# Patient Record
Sex: FEMALE | Race: WHITE | Employment: FULL TIME | URBAN - METROPOLITAN AREA
[De-identification: names, ages, dates, MRNs, and addresses within clinical notes are randomized per-mention and may not be internally consistent; named-entity substitution may affect disease eponyms.]

---

## 2018-09-05 PROBLEM — M47.812 CERVICAL SPONDYLOSIS WITHOUT MYELOPATHY: Status: ACTIVE | Noted: 2018-09-05

## 2019-01-24 ENCOUNTER — HOSPITAL ENCOUNTER (OUTPATIENT)
Dept: MRI IMAGING | Age: 59
Discharge: HOME | End: 2019-01-26
Payer: COMMERCIAL

## 2019-01-24 DIAGNOSIS — M54.16 RIGHT LUMBAR RADICULOPATHY: ICD-10-CM

## 2019-01-24 PROCEDURE — 72148 MRI LUMBAR SPINE W/O DYE: CPT

## 2020-01-23 ENCOUNTER — OFFICE VISIT (OUTPATIENT)
Dept: FAMILY MEDICINE CLINIC | Age: 60
End: 2020-01-23
Payer: COMMERCIAL

## 2020-01-23 ENCOUNTER — TELEPHONE (OUTPATIENT)
Dept: FAMILY MEDICINE CLINIC | Age: 60
End: 2020-01-23

## 2020-01-23 VITALS
HEART RATE: 68 BPM | OXYGEN SATURATION: 97 % | TEMPERATURE: 98.5 F | DIASTOLIC BLOOD PRESSURE: 80 MMHG | WEIGHT: 180 LBS | HEIGHT: 63 IN | SYSTOLIC BLOOD PRESSURE: 136 MMHG | BODY MASS INDEX: 31.89 KG/M2 | RESPIRATION RATE: 15 BRPM

## 2020-01-23 PROCEDURE — 99204 OFFICE O/P NEW MOD 45 MIN: CPT | Performed by: INTERNAL MEDICINE

## 2020-01-23 RX ORDER — ESCITALOPRAM OXALATE 10 MG/1
10 TABLET ORAL DAILY
COMMUNITY
End: 2020-07-15 | Stop reason: DRUGHIGH

## 2020-01-23 ASSESSMENT — ENCOUNTER SYMPTOMS
EYE PAIN: 0
BACK PAIN: 0
SHORTNESS OF BREATH: 0
ABDOMINAL PAIN: 0

## 2020-01-23 NOTE — PROGRESS NOTES
Occupational History    Occupation:      Employer: [employer]   Social Needs    Financial resource strain: Not on file    Food insecurity:     Worry: Not on file     Inability: Not on file    Transportation needs:     Medical: Not on file     Non-medical: Not on file   Tobacco Use    Smoking status: Former Smoker     Packs/day: 0.25     Years: 35.00     Pack years: 8.75     Start date: 2013     Last attempt to quit: 2019     Years since quittin.8    Smokeless tobacco: Never Used   Substance and Sexual Activity    Alcohol use: No     Alcohol/week: 0.0 standard drinks    Drug use: Not Currently    Sexual activity: Yes     Partners: Male   Lifestyle    Physical activity:     Days per week: Not on file     Minutes per session: Not on file    Stress: Not on file   Relationships    Social connections:     Talks on phone: Not on file     Gets together: Not on file     Attends [employer] service: Not on file     Active member of club or organization: Not on file     Attends meetings of clubs or organizations: Not on file     Relationship status: Not on file    Intimate partner violence:     Fear of current or ex partner: Not on file     Emotionally abused: Not on file     Physically abused: Not on file     Forced sexual activity: Not on file   Other Topics Concern    Not on file   Social History Narrative    Not on file     No Known Allergies  Current Outpatient Medications on File Prior to Visit   Medication Sig Dispense Refill    escitalopram (LEXAPRO) 10 MG tablet Take 10 mg by mouth daily      levothyroxine (SYNTHROID) 100 MCG tablet Take 1 tablet by mouth daily. 30 tablet 5    albuterol (PROVENTIL HFA) 108 (90 BASE) MCG/ACT inhaler Inhale 2 puffs into the lungs every 6 hours as needed for Wheezing. 1 Inhaler 3    omeprazole (PRILOSEC) 40 MG capsule Take 1 capsule by mouth daily.  30 capsule 5    lidocaine (LMX) 4 % cream Apply a half dollar sized amount to intact skin Referral Reason:   Specialty Services Required     Referred to Provider:   Basilia Sheffield MD     Requested Specialty:   Physical Medicine and Rehab     Number of Visits Requested:   1    EMG     Standing Status:   Future     Standing Expiration Date:   3/23/2020     Order Specific Question:   Which body part? Answer:   right upper extremity     No orders of the defined types were placed in this encounter. Return in about 4 weeks (around 2/20/2020) for worsening symptoms, call ASAP for appointment, Chronic condition management/appointment. Fany Leal MD    If anything should change or worsen call ASAP, don't wait for next scheduled appointment.

## 2020-07-15 ENCOUNTER — OFFICE VISIT (OUTPATIENT)
Dept: FAMILY MEDICINE CLINIC | Age: 60
End: 2020-07-15
Payer: COMMERCIAL

## 2020-07-15 ENCOUNTER — TELEPHONE (OUTPATIENT)
Dept: FAMILY MEDICINE CLINIC | Age: 60
End: 2020-07-15

## 2020-07-15 VITALS
SYSTOLIC BLOOD PRESSURE: 131 MMHG | TEMPERATURE: 97 F | HEIGHT: 63 IN | RESPIRATION RATE: 15 BRPM | DIASTOLIC BLOOD PRESSURE: 77 MMHG | HEART RATE: 66 BPM | OXYGEN SATURATION: 97 % | WEIGHT: 174 LBS | BODY MASS INDEX: 30.83 KG/M2

## 2020-07-15 PROCEDURE — 99214 OFFICE O/P EST MOD 30 MIN: CPT | Performed by: INTERNAL MEDICINE

## 2020-07-15 RX ORDER — ESCITALOPRAM OXALATE 20 MG/1
20 TABLET ORAL DAILY
Qty: 30 TABLET | Refills: 1 | Status: SHIPPED | OUTPATIENT
Start: 2020-07-15 | End: 2020-10-14 | Stop reason: SDUPTHER

## 2020-07-15 ASSESSMENT — ENCOUNTER SYMPTOMS
EYE PAIN: 0
ABDOMINAL PAIN: 0
BACK PAIN: 0
SHORTNESS OF BREATH: 0

## 2020-07-15 NOTE — PROGRESS NOTES
Subjective:      Patient ID: Min Nolen is a 62 y.o. female who presents today with:  Chief Complaint   Patient presents with    Follow-up     bilateral pain in hands and feet     Discuss Labs    Discuss Medications       HPI     Hypothyroidism-Chronic, improved with synthroid 100 micrograms once daily. Compliant. Anxiety-Chronic issue, well controlled with lexapro. No si/hi, denies depression. Mentions more of a hard time focusing and concentrating. GERD-Chronic, improved with ppi, no abdominal pain, dark or bright stools, compliant. Past Medical History:   Diagnosis Date    Colon polyposis 2013    multiple on colonoscopy, do [id number]    Depression     Esophageal web 2013    Fibromyalgia 2011    GERD (gastroesophageal reflux disease)     Hyperlipidemia     Hypothyroidism     Macular degeneration     left eye    Normal nuclear stress test 3/21/13     Past Surgical History:   Procedure Laterality Date     SECTION      CHOLECYSTECTOMY  13    Lapchole with gram    COLONOSCOPY  2013    HERNIA REPAIR      HYSTERECTOMY      still with one ovary    UPPER GASTROINTESTINAL ENDOSCOPY  2013     Social History     Socioeconomic History    Marital status:      Spouse name: Not on file    Number of children: Not on file    Years of education: Not on file    Highest education level: Not on file   Occupational History    Occupation:      Employer: [employer]   Social Needs    Financial resource strain: Not on file    Food insecurity     Worry: Not on file     Inability: Not on file    Transportation needs     Medical: Not on file     Non-medical: Not on file   Tobacco Use    Smoking status: Former Smoker     Packs/day: 0.25     Years: 35.00     Pack years: 8.75     Start date: 2013     Last attempt to quit: 2019     Years since quittin.2    Smokeless tobacco: Never Used   Substance and Sexual Activity    Alcohol use:  No Alcohol/week: 0.0 standard drinks    Drug use: Not Currently    Sexual activity: Yes     Partners: Male   Lifestyle    Physical activity     Days per week: Not on file     Minutes per session: Not on file    Stress: Not on file   Relationships    Social connections     Talks on phone: Not on file     Gets together: Not on file     Attends [employer] service: Not on file     Active member of club or organization: Not on file     Attends meetings of clubs or organizations: Not on file     Relationship status: Not on file    Intimate partner violence     Fear of current or ex partner: Not on file     Emotionally abused: Not on file     Physically abused: Not on file     Forced sexual activity: Not on file   Other Topics Concern    Not on file   Social History Narrative    Not on file     No Known Allergies  Current Outpatient Medications on File Prior to Visit   Medication Sig Dispense Refill    levothyroxine (SYNTHROID) 100 MCG tablet Take 1 tablet by mouth daily. 30 tablet 5    albuterol (PROVENTIL HFA) 108 (90 BASE) MCG/ACT inhaler Inhale 2 puffs into the lungs every 6 hours as needed for Wheezing. 1 Inhaler 3    omeprazole (PRILOSEC) 40 MG capsule Take 1 capsule by mouth daily. 30 capsule 5    lidocaine (LMX) 4 % cream Apply a half dollar sized amount to intact skin topically up to twice daily as needed for pain (Patient not taking: Reported on 1/23/2020) 1 Tube 1     No current facility-administered medications on file prior to visit. I have personally reviewed the ROS, PMH, PFH, and social history     Review of Systems   Constitutional: Negative for chills and fever. HENT: Negative for congestion. Eyes: Negative for pain. Respiratory: Negative for shortness of breath. Cardiovascular: Negative for chest pain. Gastrointestinal: Negative for abdominal pain. Genitourinary: Negative for hematuria. Musculoskeletal: Negative for back pain.    Allergic/Immunologic: Negative for immunocompromised state. Neurological: Negative for headaches. Psychiatric/Behavioral: Negative for hallucinations. Objective:   /77 (Site: Left Upper Arm, Position: Sitting, Cuff Size: Large Adult)   Pulse 66   Temp 97 °F (36.1 °C) (Oral)   Resp 15   Ht 5' 3\" (1.6 m)   Wt 174 lb (78.9 kg)   SpO2 97%   BMI 30.82 kg/m²     Physical Exam  Constitutional:       Appearance: She is well-developed. HENT:      Head: Normocephalic. Eyes:      Pupils: Pupils are equal, round, and reactive to light. Neck:      Vascular: No carotid bruit. Trachea: No tracheal deviation. Cardiovascular:      Rate and Rhythm: Normal rate and regular rhythm. Heart sounds: Normal heart sounds. No murmur. No friction rub. No gallop. Pulmonary:      Effort: No respiratory distress. Breath sounds: No wheezing or rales. Abdominal:      General: Bowel sounds are normal. There is no distension. Palpations: Abdomen is soft. Tenderness: There is no abdominal tenderness. There is no guarding or rebound. Skin:     General: Skin is warm and dry. Findings: No erythema or rash. Neurological:      Mental Status: She is oriented to person, place, and time. Sensory: No sensory deficit. Motor: No weakness. Gait: Gait normal.      Deep Tendon Reflexes: Reflexes abnormal.      Comments: Absent br on right, 1+ triceps bl  Otherwise 2+ in bl upper extremities. Assessment:       Diagnosis Orders   1. Gastroesophageal reflux disease without esophagitis     2. Anxiety     3. Hypothyroidism, unspecified type           Plan:    vc  Please get labs done. Didn't get mri c spine done yet. Continue chronic medications  Saw ent  Did not complete emg not see pain management. (she will reschedule)  Increase lexapro and see if she does better  Understands SSI can increase SI and to call immediately if this should occur. Get last colonoscopy records.    Get mammogram 10/2019 from [facility] Needs periodic pelvic for remaining ovary (risk of ovarian cancer)  She will schedule with NP for pelvic exam.   Explained risk of AIN, mg wasting, hip fracture, osteoporosis, etc.   Recommend you discuss with your GI about long term use   Get ct neck from  (dr Bozena Ibarra had reviewed this per patient)   Need cervical xrays. Already did PT. (defer to Pain management) she will follow up     No orders of the defined types were placed in this encounter. Orders Placed This Encounter   Medications    escitalopram (LEXAPRO) 20 MG tablet     Sig: Take 1 tablet by mouth daily     Dispense:  30 tablet     Refill:  1     If anything should change or worsen call ASAP, don't wait for next scheduled appointment. No follow-ups on file.       Aleksandar Draper MD

## 2020-07-16 NOTE — PATIENT INSTRUCTIONS
Patient Education        Hypothyroidism: Care Instructions  Your Care Instructions     When you have hypothyroidism, your body doesn't make enough thyroid hormone. This hormone helps your body use energy. If your thyroid level is low, you may feel tired, be constipated, have an increase in your blood pressure, or have dry skin or memory problems. You may also get cold easily, even when it is warm. Women with low thyroid levels may have heavy menstrual periods. A blood test to find your thyroid-stimulating hormone (TSH) level is used to check for hypothyroidism. A high TSH level may mean that you have it. The treatment for hypothyroidism is thyroid hormone pills. You should start to feel better in 1 to 2 weeks. Most people need treatment for the rest of their lives. You will need regular visits with your doctor to make sure you are doing well and that you have the right dose of medicine. Follow-up care is a key part of your treatment and safety. Be sure to make and go to all appointments, and call your doctor if you are having problems. It's also a good idea to know your test results and keep a list of the medicines you take. How can you care for yourself at home? · Take your thyroid hormone medicine exactly as prescribed. Call your doctor if you think you are having a problem with your medicine. Most people do not have side effects if they take the right amount of medicine regularly. ? Take the medicine 30 minutes before breakfast, and do not take it with calcium, vitamins, or iron. ? Do not take extra doses of your thyroid medicine. It will not help you get better any faster, and it may cause side effects. ? If you forget to take a dose, do NOT take a double dose of medicine. Take your usual dose the next day. · Tell your doctor about all prescription, herbal, or over-the-counter products you take. · Take care of yourself. Eat a healthy diet, get enough sleep, and get regular exercise.   When should you

## 2020-08-07 DIAGNOSIS — R22.1 NECK MASS: ICD-10-CM

## 2020-08-07 DIAGNOSIS — E03.9 HYPOTHYROIDISM, UNSPECIFIED TYPE: ICD-10-CM

## 2020-08-07 DIAGNOSIS — K21.9 GASTROESOPHAGEAL REFLUX DISEASE WITHOUT ESOPHAGITIS: ICD-10-CM

## 2020-08-07 DIAGNOSIS — F41.9 ANXIETY: ICD-10-CM

## 2020-08-07 DIAGNOSIS — M79.651 PAIN OF RIGHT THIGH: ICD-10-CM

## 2020-08-07 DIAGNOSIS — M54.2 NECK PAIN: ICD-10-CM

## 2020-08-07 LAB
ALBUMIN SERPL-MCNC: 4.5 G/DL (ref 3.5–4.6)
ALP BLD-CCNC: 87 U/L (ref 40–130)
ALT SERPL-CCNC: 14 U/L (ref 0–33)
ANION GAP SERPL CALCULATED.3IONS-SCNC: 14 MEQ/L (ref 9–15)
AST SERPL-CCNC: 15 U/L (ref 0–35)
BASOPHILS ABSOLUTE: 0 K/UL (ref 0–0.2)
BASOPHILS RELATIVE PERCENT: 0.7 %
BILIRUB SERPL-MCNC: 0.6 MG/DL (ref 0.2–0.7)
BUN BLDV-MCNC: 21 MG/DL (ref 6–20)
CALCIUM SERPL-MCNC: 9.5 MG/DL (ref 8.5–9.9)
CHLORIDE BLD-SCNC: 103 MEQ/L (ref 95–107)
CHOLESTEROL, TOTAL: 242 MG/DL (ref 0–199)
CO2: 23 MEQ/L (ref 20–31)
CORTISOL - AM: 9.6 UG/DL (ref 6.2–19.4)
CREAT SERPL-MCNC: 0.92 MG/DL (ref 0.5–0.9)
EOSINOPHILS ABSOLUTE: 0.1 K/UL (ref 0–0.7)
EOSINOPHILS RELATIVE PERCENT: 1.8 %
GFR AFRICAN AMERICAN: >60
GFR NON-AFRICAN AMERICAN: >60
GLOBULIN: 2.8 G/DL (ref 2.3–3.5)
GLUCOSE BLD-MCNC: 92 MG/DL (ref 70–99)
HBA1C MFR BLD: 6 % (ref 4.8–5.9)
HCT VFR BLD CALC: 39.8 % (ref 37–47)
HDLC SERPL-MCNC: 51 MG/DL (ref 40–59)
HEMOGLOBIN: 13.3 G/DL (ref 12–16)
LDL CHOLESTEROL CALCULATED: 170 MG/DL (ref 0–129)
LYMPHOCYTES ABSOLUTE: 1.9 K/UL (ref 1–4.8)
LYMPHOCYTES RELATIVE PERCENT: 37.7 %
MCH RBC QN AUTO: 29.7 PG (ref 27–31.3)
MCHC RBC AUTO-ENTMCNC: 33.4 % (ref 33–37)
MCV RBC AUTO: 89 FL (ref 82–100)
MONOCYTES ABSOLUTE: 0.3 K/UL (ref 0.2–0.8)
MONOCYTES RELATIVE PERCENT: 6.8 %
NEUTROPHILS ABSOLUTE: 2.7 K/UL (ref 1.4–6.5)
NEUTROPHILS RELATIVE PERCENT: 53 %
PDW BLD-RTO: 13.8 % (ref 11.5–14.5)
PLATELET # BLD: 285 K/UL (ref 130–400)
POTASSIUM SERPL-SCNC: 4.5 MEQ/L (ref 3.4–4.9)
RBC # BLD: 4.47 M/UL (ref 4.2–5.4)
SODIUM BLD-SCNC: 140 MEQ/L (ref 135–144)
TOTAL PROTEIN: 7.3 G/DL (ref 6.3–8)
TRIGL SERPL-MCNC: 103 MG/DL (ref 0–150)
TSH REFLEX: 0.75 UIU/ML (ref 0.44–3.86)
VITAMIN D 25-HYDROXY: 50.2 NG/ML (ref 30–100)
WBC # BLD: 5.1 K/UL (ref 4.8–10.8)

## 2020-08-12 ENCOUNTER — TELEPHONE (OUTPATIENT)
Dept: FAMILY MEDICINE CLINIC | Age: 60
End: 2020-08-12

## 2020-08-12 ENCOUNTER — VIRTUAL VISIT (OUTPATIENT)
Dept: FAMILY MEDICINE CLINIC | Age: 60
End: 2020-08-12
Payer: COMMERCIAL

## 2020-08-12 PROCEDURE — 99214 OFFICE O/P EST MOD 30 MIN: CPT | Performed by: INTERNAL MEDICINE

## 2020-08-12 RX ORDER — ATORVASTATIN CALCIUM 40 MG/1
40 TABLET, FILM COATED ORAL DAILY
Qty: 90 TABLET | Refills: 0 | Status: SHIPPED | OUTPATIENT
Start: 2020-08-12 | End: 2020-11-11

## 2020-08-12 RX ORDER — ATORVASTATIN CALCIUM 20 MG/1
20 TABLET, FILM COATED ORAL DAILY
Qty: 90 TABLET | Refills: 0 | OUTPATIENT
Start: 2020-08-12

## 2020-08-12 RX ORDER — ATORVASTATIN CALCIUM 20 MG/1
20 TABLET, FILM COATED ORAL DAILY
Qty: 30 TABLET | Refills: 0 | Status: SHIPPED | OUTPATIENT
Start: 2020-08-12 | End: 2021-02-16 | Stop reason: SDUPTHER

## 2020-08-12 NOTE — TELEPHONE ENCOUNTER
Mammogram done through [facility]    Colonoscopy needs records Dr. Jolene Shipley records for both

## 2020-08-12 NOTE — PROGRESS NOTES
Subjective:      Patient ID: Yamila Davis is a 62 y.o. female who presents today with:  No chief complaint on file. HPI      Chronic, improved with statin therapy. Known obesity. FeMale Sex. Compliant with therapy. Mother has heart disease. High cholesterol and brother has similar issues    Hypothyroidism-Chronic, improved with synthroid 100 micrograms once daily. Compliant. Denies being cold. Anxiety-Chronic issue, on lexapro 20 mg once daily. No SI/HI. Mentions difficulty concentrating. Some difficulty falling asleep as well. Past Medical History:   Diagnosis Date    Colon polyposis 2013    multiple on colonoscopy, do [id number]    Depression     Esophageal web 2013    Fibromyalgia 2011    GERD (gastroesophageal reflux disease)     Hyperlipidemia     Hypothyroidism     Macular degeneration     left eye    Normal nuclear stress test 3/21/13     Past Surgical History:   Procedure Laterality Date     SECTION      CHOLECYSTECTOMY  13    Lapchole with gram    COLONOSCOPY  2013    HERNIA REPAIR      HYSTERECTOMY      still with one ovary    UPPER GASTROINTESTINAL ENDOSCOPY  2013     Social History     Socioeconomic History    Marital status:      Spouse name: Not on file    Number of children: Not on file    Years of education: Not on file    Highest education level: Not on file   Occupational History    Occupation:      Employer: [employer]   Social Needs    Financial resource strain: Not on file    Food insecurity     Worry: Not on file     Inability: Not on file    Transportation needs     Medical: Not on file     Non-medical: Not on file   Tobacco Use    Smoking status: Former Smoker     Packs/day: 0.25     Years: 35.00     Pack years: 8.75     Start date: 2013     Last attempt to quit: 2019     Years since quittin.3    Smokeless tobacco: Never Used   Substance and Sexual Activity    Alcohol use:  No Alcohol/week: 0.0 standard drinks    Drug use: Not Currently    Sexual activity: Yes     Partners: Male   Lifestyle    Physical activity     Days per week: Not on file     Minutes per session: Not on file    Stress: Not on file   Relationships    Social connections     Talks on phone: Not on file     Gets together: Not on file     Attends Latter day service: Not on file     Active member of club or organization: Not on file     Attends meetings of clubs or organizations: Not on file     Relationship status: Not on file    Intimate partner violence     Fear of current or ex partner: Not on file     Emotionally abused: Not on file     Physically abused: Not on file     Forced sexual activity: Not on file   Other Topics Concern    Not on file   Social History Narrative    Not on file     No Known Allergies  Current Outpatient Medications on File Prior to Visit   Medication Sig Dispense Refill    escitalopram (LEXAPRO) 20 MG tablet Take 1 tablet by mouth daily 30 tablet 1    levothyroxine (SYNTHROID) 100 MCG tablet Take 1 tablet by mouth daily. 30 tablet 5    omeprazole (PRILOSEC) 40 MG capsule Take 1 capsule by mouth daily. 30 capsule 5     No current facility-administered medications on file prior to visit. I have personally reviewed the ROS, PMH, PFH, and social history     Review of Systems    Objective: There were no vitals taken for this visit. Physical Exam  Constitutional:       General: She is not in acute distress. Appearance: She is not ill-appearing, toxic-appearing or diaphoretic. Pulmonary:      Effort: No respiratory distress. Assessment:       Diagnosis Orders   1. Hypothyroidism, unspecified type     2. Hyperlipidemia, unspecified hyperlipidemia type  atorvastatin (LIPITOR) 20 MG tablet   3. Unable to concentrate  Lam Monteiro, PhD, Psychology, [place]   4. Anxiety     5. Screening for breast cancer  Comprehensive Metabolic Panel   6.  Elevated serum creatinine  Urinalysis    IRIS DIGITAL SCREEN W OR WO CAD BILATERAL         Plan:    Video visit done because of coronavirus pandemic. Visit done via doxy. me   explained billeable visit, patient understands and agrees to continue  I was at home and patient was at home during this visit. Refer to [facility] for adhd testing  No si/hi, Continue lexapro. Continue chronic medications  Recommend statin, sent. Pre-diabetes, recommend exercise. Will repeat cmp and ua in one month. On lexapro 20 mg once no se, but no real difference. Didn't get mri c spine done yet. She will schedule with NP for pelvic exam. she will do this  She will follow up with dr Cynthia Butler of permanent damage explained  Did not complete emg nor did she see pain management. (she will reschedule)  She admits she puts stuff \"off\"  Make another attempt to get colonoscopy and mammogram records.    Needs periodic pelvic for remaining ovary (risk of ovarian cancer)  Get ct neck from [facility] (dr David Talley had reviewed this per patient) she will help get records. (patient)             Orders Placed This Encounter   Procedures    IRIS DIGITAL SCREEN W OR WO CAD BILATERAL     Standing Status:   Future     Standing Expiration Date:   10/12/2021     Order Specific Question:   Reason for exam:     Answer:   screening    Comprehensive Metabolic Panel     Standing Status:   Future     Standing Expiration Date:   8/12/2021    Urinalysis     Standing Status:   Future     Standing Expiration Date:   8/12/2021   Maren Clayton, PhD, Psychology, [place]     Referral Priority:   Routine     Referral Type:   Eval and Treat     Referral Reason:   Specialty Services Required     Referred to Provider:   Kayleigh Euceda PSYD     Requested Specialty:   Psychology     Number of Visits Requested:   1     Orders Placed This Encounter   Medications    atorvastatin (LIPITOR) 20 MG tablet     Sig: Take 1 tablet by mouth daily     Dispense:  30 tablet     Refill:  0

## 2020-08-14 NOTE — PATIENT INSTRUCTIONS
Patient Education        escitalopram  Pronunciation:  QUOC COTTON o mikayla  Brand:  Lexapro  What is the most important information I should know about escitalopram?  You should not use this medicine you also take pimozide (Orap) or citalopram (Celexa). Do not use escitalopram within 14 days before or 14 days after you have used an MAO inhibitor, such as isocarboxazid, linezolid, methylene blue injection, phenelzine, rasagiline, selegiline, or tranylcypromine. Some young people have thoughts about suicide when first taking an antidepressant. Stay alert to changes in your mood or symptoms. Report any new or worsening symptoms to your doctor. Seek medical attention right away if you have symptoms of serotonin syndrome, such as: agitation, hallucinations, fever, sweating, shivering, fast heart rate, muscle stiffness, twitching, loss of coordination, nausea, vomiting, or diarrhea. Do not give this medicine to anyone under 12 years. What is escitalopram?  Escitalopram is an antidepressant in a group of drugs called selective serotonin reuptake inhibitors (SSRIs). Escitalopram affects chemicals in the brain that may be unbalanced in people with depression or anxiety. Escitalopram is used to treat anxiety in adults. Escitalopram is also used to treat major depressive disorder in adults and adolescents who are at least 15years old. Escitalopram may also be used for purposes not listed in this medication guide. What should I discuss with my healthcare provider before taking escitalopram?  You should not use this medicine if you are allergic to escitalopram or citalopram (Celexa), or if:  · you also take pimozide or citalopram.  Do not use escitalopram within 14 days before or 14 days after you have used an MAO inhibitor. A dangerous drug interaction could occur. MAO inhibitors include isocarboxazid, linezolid, phenelzine, rasagiline, selegiline, and tranylcypromine.   Some medicines can interact with escitalopram and cause a serious condition called serotonin syndrome. Be sure your doctor knows if you also take stimulant medicine, opioid medicine, herbal products, or medicine for depression, mental illness, Parkinson's disease, migraine headaches, serious infections, or prevention of nausea and vomiting. Ask your doctor before making any changes in how or when you take your medications. To make sure escitalopram is safe for you, tell your doctor if you have ever had:  · liver or kidney disease;  · seizures;  · low levels of sodium in your blood;  · heart disease, high blood pressure;  · a stroke;  · a bleeding or blood clotting disorder;  · bipolar disorder (manic depression); or  · drug addiction or suicidal thoughts. Some young people have thoughts about suicide when first taking an antidepressant. Your doctor should check your progress at regular visits. Your family or other caregivers should also be alert to changes in your mood or symptoms. Taking an SSRI antidepressant during pregnancy may cause serious lung problems or other complications in the baby. However, you may have a relapse of depression if you stop taking your antidepressant. Tell your doctor right away if you become pregnant while taking escitalopram. Do not start or stop taking this medicine during pregnancy without your doctor's advice. Escitalopram can pass into breast milk and may harm a nursing baby. Tell your doctor if you are breast-feeding a baby. Escitalopram should not be given to a child younger than 15years old. How should I take escitalopram?  Follow all directions on your prescription label. Your doctor may occasionally change your dose. Do not take this medicine in larger or smaller amounts or for longer than recommended. You may take escitalopram with or without food. Try to take the medicine at the same time each day. Measure liquid medicine with the dosing syringe provided, or with a special dose-measuring spoon or medicine cup.  If around lights;  · racing thoughts, unusual risk-taking behavior, feelings of extreme happiness or sadness;  · low levels of sodium in the body --headache, confusion, slurred speech, severe weakness, vomiting, loss of coordination, feeling unsteady; or  · severe nervous system reaction --very stiff (rigid) muscles, high fever, sweating, confusion, fast or uneven heartbeats, tremors, feeling like you might pass out. Seek medical attention right away if you have symptoms of serotonin syndrome, such as: agitation, hallucinations, fever, sweating, shivering, fast heart rate, muscle stiffness, twitching, loss of coordination, nausea, vomiting, or diarrhea. Common side effects may include:  · dizziness, drowsiness, weakness;  · sweating, feeling shaky or anxious;  · sleep problems (insomnia);  · dry mouth, loss of appetite;  · nausea, constipation;  · yawning;  · weight changes; or  · decreased sex drive, impotence, or difficulty having an orgasm. This is not a complete list of side effects and others may occur. Call your doctor for medical advice about side effects. You may report side effects to FDA at 5-363-FDA-7993. What other drugs will affect escitalopram?  Taking escitalopram with other drugs that make you sleepy can worsen this effect. Ask your doctor before taking a sleeping pill, narcotic medication, muscle relaxer, or medicine for anxiety, depression, or seizures.   Tell your doctor about all medicines you use, and those you start or stop using during your treatment with escitalopram, especially:  · any other antidepressant;  · medicine to treat anxiety, mood disorders, or mental illness;  · lithium, Bib's wort, tramadol, or tryptophan (sometimes called L-tryptophan);  · a blood thinner --warfarin, Coumadin, Jantoven;  · migraine headache medication --sumatriptan, rizatriptan, and others;  · narcotic pain medication --fentanyl or tramadol; or  · stimulants or ADHD medication --Adderall, Concerta, Ritalin, and others; This list is not complete. Other drugs may interact with escitalopram, including prescription and over-the-counter medicines, vitamins, and herbal products. Not all possible interactions are listed in this medication guide. Where can I get more information? Your pharmacist can provide more information about escitalopram.  Remember, keep this and all other medicines out of the reach of children, never share your medicines with others, and use this medication only for the indication prescribed. Every effort has been made to ensure that the information provided by [address] is accurate, up-to-date, and complete, but no guarantee is made to that effect. Drug information contained herein may be time sensitive. [facility] information has been compiled for use by healthcare practitioners and consumers in the [place] and therefore [facility] does not warrant that uses outside of the [place] are appropriate, unless specifically indicated otherwise. [facility]'s drug information does not endorse drugs, diagnose patients or recommend therapy. [facility] information is an informational resource designed to assist licensed healthcare practitioners in caring for their patients and/or to serve consumers viewing this service as a supplement to, and not a substitute for, the expertise, skill, knowledge and judgment of healthcare practitioners. The absence of a warning for a given drug or drug combination in no way should be construed to indicate that the drug or drug combination is safe, effective or appropriate for any given patient. [facility] does not assume any responsibility for any aspect of healthcare administered with the aid of information [facility] provides. The information contained herein is not intended to cover all possible uses, directions, precautions, warnings, drug interactions, allergic reactions, or adverse effects.  If you have questions about the drugs you are taking,

## 2020-08-18 ENCOUNTER — TELEPHONE (OUTPATIENT)
Dept: FAMILY MEDICINE CLINIC | Age: 60
End: 2020-08-18

## 2020-09-26 ENCOUNTER — TELEPHONE (OUTPATIENT)
Dept: FAMILY MEDICINE CLINIC | Age: 60
End: 2020-09-26

## 2020-09-26 NOTE — TELEPHONE ENCOUNTER
Please call office of dr Amari Londono [facility]( when is her next colonoscopy date? We didn't receive pathology report.      Thank you

## 2020-10-02 ENCOUNTER — TELEPHONE (OUTPATIENT)
Dept: FAMILY MEDICINE CLINIC | Age: 60
End: 2020-10-02

## 2020-10-14 RX ORDER — ESCITALOPRAM OXALATE 20 MG/1
20 TABLET ORAL DAILY
Qty: 90 TABLET | Refills: 1 | Status: SHIPPED | OUTPATIENT
Start: 2020-10-14 | End: 2022-01-10 | Stop reason: SDUPTHER

## 2020-10-14 RX ORDER — LEVOTHYROXINE SODIUM 0.1 MG/1
100 TABLET ORAL DAILY
Qty: 90 TABLET | Refills: 3 | Status: SHIPPED | OUTPATIENT
Start: 2020-10-14 | End: 2021-10-25

## 2020-10-14 NOTE — TELEPHONE ENCOUNTER
Pt called office requesting medication refill.      Rx requested:  Requested Prescriptions     Pending Prescriptions Disp Refills    levothyroxine (SYNTHROID) 100 MCG tablet 30 tablet 5     Sig: Take 1 tablet by mouth daily    escitalopram (LEXAPRO) 20 MG tablet 30 tablet 1     Sig: Take 1 tablet by mouth daily       Last Office Visit:   8/12/2020      Next Visit Date:  Future Appointments   Date Time Provider Stew Casanova   10/28/2020  3:00 PM MD IHSAN Jimenez [facility]   11/3/2020  3:00 PM MD IHSAN Jimenez [facility]   11/10/2020 11:30 AM ROSIBEL Rendon CNP [facility]

## 2020-10-23 ENCOUNTER — TELEPHONE (OUTPATIENT)
Dept: FAMILY MEDICINE CLINIC | Age: 60
End: 2020-10-23

## 2020-10-23 NOTE — TELEPHONE ENCOUNTER
Patient called to schedule her mammogram. She has pain in her left breast so they need the order to be changed to a diagnostic mammogram.  Thank you

## 2020-11-04 ENCOUNTER — TELEPHONE (OUTPATIENT)
Dept: FAMILY MEDICINE CLINIC | Age: 60
End: 2020-11-04

## 2020-11-04 NOTE — TELEPHONE ENCOUNTER
Please get copy of report from 2017 through 2019 mri and ct scans  Unable to access disc  Thanks.    This from [facility]

## 2021-01-07 ENCOUNTER — HOSPITAL ENCOUNTER (OUTPATIENT)
Dept: WOMENS IMAGING | Age: 61
Discharge: HOME | End: 2021-01-09
Payer: COMMERCIAL

## 2021-01-07 ENCOUNTER — HOSPITAL ENCOUNTER (OUTPATIENT)
Dept: ULTRASOUND IMAGING | Age: 61
Discharge: HOME | End: 2021-01-09
Payer: COMMERCIAL

## 2021-01-07 DIAGNOSIS — N64.4 BREAST PAIN: ICD-10-CM

## 2021-01-07 PROCEDURE — 76642 ULTRASOUND BREAST LIMITED: CPT

## 2021-01-07 PROCEDURE — G0279 TOMOSYNTHESIS, MAMMO: HCPCS

## 2021-02-10 RX ORDER — ATORVASTATIN CALCIUM 40 MG/1
TABLET, FILM COATED ORAL
COMMUNITY
Start: 2021-01-11 | End: 2021-08-19

## 2021-02-16 DIAGNOSIS — E78.5 HYPERLIPIDEMIA, UNSPECIFIED HYPERLIPIDEMIA TYPE: ICD-10-CM

## 2021-02-16 RX ORDER — ATORVASTATIN CALCIUM 20 MG/1
20 TABLET, FILM COATED ORAL DAILY
Qty: 90 TABLET | Refills: 3 | Status: SHIPPED | OUTPATIENT
Start: 2021-02-16 | End: 2021-09-16 | Stop reason: CLARIF

## 2021-02-16 NOTE — TELEPHONE ENCOUNTER
requesting medication refill. Rx requested:  Requested Prescriptions      No prescriptions requested or ordered in this encounter       Last Office Visit:   8/12/2020    Last Tox screen:        Last Medication contract:        Next Visit Date:  No future appointments.

## 2021-05-21 ENCOUNTER — VIRTUAL VISIT (OUTPATIENT)
Dept: FAMILY MEDICINE CLINIC | Age: 61
End: 2021-05-21
Payer: COMMERCIAL

## 2021-05-21 DIAGNOSIS — N30.00 ACUTE CYSTITIS WITHOUT HEMATURIA: Primary | ICD-10-CM

## 2021-05-21 PROCEDURE — 99213 OFFICE O/P EST LOW 20 MIN: CPT | Performed by: NURSE PRACTITIONER

## 2021-05-21 RX ORDER — NITROFURANTOIN 25; 75 MG/1; MG/1
100 CAPSULE ORAL 2 TIMES DAILY
Qty: 10 CAPSULE | Refills: 0 | Status: SHIPPED | OUTPATIENT
Start: 2021-05-21 | End: 2021-05-26

## 2021-05-21 ASSESSMENT — ENCOUNTER SYMPTOMS
VOMITING: 0
COUGH: 0
SHORTNESS OF BREATH: 0
WHEEZING: 0
GASTROINTESTINAL NEGATIVE: 1
NAUSEA: 0

## 2021-05-21 NOTE — PROGRESS NOTES
Subjective:     Patient ID: Kristan Garcia is a 62 y.o. female who presentstoday for:  Chief Complaint   Patient presents with    Urinary Tract Infection         TELEHEALTH EVALUATION -- Audio/Visual (During CVRHQ-66 public health emergency)    -   Kristna Garcia is a 62 y.o. female being evaluated by a Virtual Visit (video visit) encounter to address concerns as mentioned above. A caregiver was present when appropriate. Due to this being a TeleHealth encounter (During AWPGJ-53 public health emergency), evaluation of the following organ systems was limited: Vitals/Constitutional/EENT/Resp/CV/GI//MS/Neuro/Skin/Heme-Lymph-Imm. Pursuant to the emergency declaration under the [address] [place] [address] authority and the [employer] Act, this Virtual Visit was conducted with patient's (and/or legal guardian's) consent, to reduce the patient's risk of exposure to COVID-19 and provide necessary medical care. The patient (and/or legal guardian) has also been advised to contact this office for worsening conditions or problems, and seek emergency medical treatment and/or call 911 if deemed necessary. Services were provided through a video synchronous discussion virtually to substitute for in-person clinic visit. Type of encounter was __x Doxy __ MyChart ___Facetime    Patient was located at their home. Provider was located at their ___ home or        _x__ office. --ROSIBEL Lees - CNP on 5/21/2021 at 8:25 AM    An electronic signature was used to authenticate this note. Urinary Tract Infection   This is a new problem. The current episode started in the past 7 days. The problem occurs every urination. The problem has been waxing and waning. The quality of the pain is described as aching and burning. The pain is mild. She is not sexually active. There is no history of pyelonephritis.  Associated symptoms include frequency, hesitancy and urgency. Pertinent negatives include no chills, discharge, flank pain, hematuria, nausea, possible pregnancy, sweats or vomiting. She has tried increased fluids for the symptoms. The treatment provided mild relief. There is no history of recurrent UTIs. Past Medical History:   Diagnosis Date    Colon polyposis 2013    multiple on colonoscopy, do [id number]    Depression     Esophageal web 2013    Fibromyalgia 2011    GERD (gastroesophageal reflux disease)     Hyperlipidemia     Hypothyroidism     Macular degeneration     left eye    Normal nuclear stress test 3/21/13     Current Outpatient Medications on File Prior to Visit   Medication Sig Dispense Refill    atorvastatin (LIPITOR) 20 MG tablet Take 1 tablet by mouth daily (CONTACT DOCTOR) 90 tablet 3    atorvastatin (LIPITOR) 40 MG tablet TAKE 1 TABLET BY MOUTH DAILY      levothyroxine (SYNTHROID) 100 MCG tablet Take 1 tablet by mouth daily 90 tablet 3    escitalopram (LEXAPRO) 20 MG tablet Take 1 tablet by mouth daily 90 tablet 1    omeprazole (PRILOSEC) 40 MG capsule Take 1 capsule by mouth daily. 30 capsule 5     No current facility-administered medications on file prior to visit. Past Surgical History:   Procedure Laterality Date     SECTION      CHOLECYSTECTOMY  13    Lapchole with gram    COLONOSCOPY  2013    HERNIA REPAIR      HYSTERECTOMY      still with one ovary    UPPER GASTROINTESTINAL ENDOSCOPY  2013        Family History   Problem Relation Age of Onset    Heart Disease Mother         COPD, she has cervical cancer as well.  Stroke Mother         No other fdr with cancer, no aneurysm or heart problems.  Diabetes Mother     Cancer Father         STOMACH, committed suicide    Other Sister         low thyroid     Other Brother         ?stroke, not verified.       Social History     Socioeconomic History    Marital status:      Spouse name: Not on file  Number of children: Not on file    Years of education: Not on file    Highest education level: Not on file   Occupational History    Occupation:      Employer: [employer]   Tobacco Use    Smoking status: Former Smoker     Packs/day: 0.25     Years: 35.00     Pack years: 8.75     Start date: 2013     Quit date: 2019     Years since quittin.1    Smokeless tobacco: Never Used   Vaping Use    Vaping Use: Former    Substances: Always   Substance and Sexual Activity    Alcohol use: No     Alcohol/week: 0.0 standard drinks    Drug use: Not Currently    Sexual activity: Yes     Partners: Male   Other Topics Concern    Not on file   Social History Narrative    Not on file     Social Determinants of Health     Financial Resource Strain:     Difficulty of Paying Living Expenses:    Food Insecurity:     Worried About Running Out of Food in the Last Year:     [address] in the Last Year:    Transportation Needs:     Lack of Transportation (Medical):  Lack of Transportation (Non-Medical):    Physical Activity:     Days of Exercise per Week:     Minutes of Exercise per Session:    Stress:     Feeling of Stress :    Social Connections:     Frequency of Communication with Friends and Family:     Frequency of Social Gatherings with Friends and Family:     Attends Shinto Services:     Active Member of Clubs or Organizations:     Attends Club or Organization Meetings:     Marital Status:    Intimate Partner Violence:     Fear of Current or Ex-Partner:     Emotionally Abused:     Physically Abused:     Sexually Abused: Allergies:  Patient has no known allergies. Review of Systems   Constitutional: Negative for chills, diaphoresis and fever. HENT: Negative. Respiratory: Negative for cough, shortness of breath and wheezing. Cardiovascular: Negative. Gastrointestinal: Negative. Negative for nausea and vomiting.    Genitourinary: Positive for dysuria, frequency, hesitancy and urgency. Negative for flank pain and hematuria. Neurological: Negative for dizziness, syncope, weakness, light-headedness and headaches. Objective: There were no vitals taken for this visit. Physical Exam  Constitutional:       General: She is not in acute distress. Pulmonary:      Effort: No respiratory distress. Neurological:      Mental Status: She is alert and oriented to person, place, and time. Psychiatric:         Mood and Affect: Mood normal.         Behavior: Behavior normal.         Assessment & Plan:       Diagnosis Orders   1. Acute cystitis without hematuria  nitrofurantoin, macrocrystal-monohydrate, (MACROBID) 100 MG capsule     No orders of the defined types were placed in this encounter. Orders Placed This Encounter   Medications    nitrofurantoin, macrocrystal-monohydrate, (MACROBID) 100 MG capsule     Sig: Take 1 capsule by mouth 2 times daily for 5 days     Dispense:  10 capsule     Refill:  0     There are no discontinued medications. Return if symptoms worsen or fail to improve. Reviewed with the patient: currentclinical status, medications, activities and diet. Side effects, adverse effects of the medicationprescribed today, as well as treatment plan/ rationale and result expectations havebeen discussed with the patient who expresses understanding and desires to proceed. Pt instructions reviewed and given to patient.     Close follow up to evaluate treatment resultsand for coordination of care. I have reviewed the patient's medical historyin detail and updated the computerized patient record.     Justo Moritz, ROSIBEL - CNP

## 2021-08-05 DIAGNOSIS — Z79.891 ENCOUNTER FOR LONG-TERM OPIATE ANALGESIC USE: ICD-10-CM

## 2021-08-05 DIAGNOSIS — F11.90 CHRONIC, CONTINUOUS USE OF OPIOIDS: ICD-10-CM

## 2021-08-05 DIAGNOSIS — M47.817 LUMBOSACRAL SPONDYLOSIS WITHOUT MYELOPATHY: ICD-10-CM

## 2021-08-05 DIAGNOSIS — M54.16 RIGHT LUMBAR RADICULOPATHY: ICD-10-CM

## 2021-08-05 DIAGNOSIS — M47.812 CERVICAL SPONDYLOSIS WITHOUT MYELOPATHY: ICD-10-CM

## 2021-08-05 NOTE — TELEPHONE ENCOUNTER
Requested Prescriptions     Pending Prescriptions Disp Refills    tiZANidine (ZANAFLEX) 4 MG tablet 30 tablet 2     Sig: Take 1 tablet by mouth nightly as needed (pain)    traMADol (ULTRAM) 50 MG tablet 15 tablet 0     Sig: Take 1 tablet by mouth daily as needed for Pain for up to 15 days.        Patient last seen on:  4/21  Date of last surgery:  n/a  Date of last refill:  4/21, 2/9  Pain level:  n/a  Patient complaining of:  Pt asking for refill  Future appts: 8/18

## 2021-08-06 RX ORDER — TIZANIDINE 4 MG/1
4 TABLET ORAL NIGHTLY PRN
Qty: 12 TABLET | Refills: 0 | Status: SHIPPED | OUTPATIENT
Start: 2021-08-06 | End: 2021-08-18 | Stop reason: SDUPTHER

## 2021-08-06 RX ORDER — TRAMADOL HYDROCHLORIDE 50 MG/1
50 TABLET ORAL DAILY PRN
Qty: 5 TABLET | Refills: 0 | Status: SHIPPED | OUTPATIENT
Start: 2021-08-06 | End: 2021-08-11

## 2021-08-18 ENCOUNTER — OFFICE VISIT (OUTPATIENT)
Dept: PAIN MANAGEMENT | Age: 61
End: 2021-08-18
Payer: COMMERCIAL

## 2021-08-18 VITALS — WEIGHT: 160 LBS | TEMPERATURE: 97.3 F | HEIGHT: 64 IN | BODY MASS INDEX: 27.31 KG/M2

## 2021-08-18 DIAGNOSIS — Z79.891 ENCOUNTER FOR LONG-TERM OPIATE ANALGESIC USE: ICD-10-CM

## 2021-08-18 DIAGNOSIS — F11.90 CHRONIC, CONTINUOUS USE OF OPIOIDS: ICD-10-CM

## 2021-08-18 DIAGNOSIS — M47.812 CERVICAL SPONDYLOSIS WITHOUT MYELOPATHY: Primary | ICD-10-CM

## 2021-08-18 DIAGNOSIS — G56.01 CARPAL TUNNEL SYNDROME OF RIGHT WRIST: ICD-10-CM

## 2021-08-18 DIAGNOSIS — M47.817 LUMBOSACRAL SPONDYLOSIS WITHOUT MYELOPATHY: ICD-10-CM

## 2021-08-18 PROCEDURE — 99214 OFFICE O/P EST MOD 30 MIN: CPT | Performed by: PHYSICAL MEDICINE & REHABILITATION

## 2021-08-18 RX ORDER — TRAMADOL HYDROCHLORIDE 50 MG/1
50 TABLET ORAL DAILY PRN
Qty: 10 TABLET | Refills: 0 | Status: SHIPPED | OUTPATIENT
Start: 2021-08-18 | End: 2021-10-07 | Stop reason: SDUPTHER

## 2021-08-18 RX ORDER — TIZANIDINE 4 MG/1
4 TABLET ORAL NIGHTLY PRN
Qty: 30 TABLET | Refills: 0 | Status: SHIPPED | OUTPATIENT
Start: 2021-08-18 | End: 2021-10-07 | Stop reason: SDUPTHER

## 2021-08-18 ASSESSMENT — ENCOUNTER SYMPTOMS
DIARRHEA: 0
CONSTIPATION: 0
NAUSEA: 0
SHORTNESS OF BREATH: 0
BACK PAIN: 1

## 2021-08-18 NOTE — PROGRESS NOTES
Monika Maddox  (1960)    8/18/2021    Subjective:     Rl Fitzpatrick is 62 y.o. female who complains today of:    Chief Complaint   Patient presents with    Neck Pain    Back Pain       Seen by me 12/27/2018, last seen 4/21/2021: EMG BLE, EMG BUE done, reviewed below. The back brace helps. She notes that the right carpal tunnel splint is also helpful. Gabapentin makes her feel funny and this was discontinued. Bilateral cervical facet corticosteroid joint injections C5-6 C6-7 C7-T1 on 11/11/2020 provided her with greater than 90% pain relief, she was very pleased, it helped her for quite a while. Right lumbar epidural transforaminal epidural steroid injection on 11/20/2020 provided her with greater than 80% pain relief. She notes that it helped her with her leg numbness and she is very pleased. She is anticipating some improved work conditions that may help her with her pain. She is requesting repeat injections today. No other tests therapy or updates from other physicians, no emergency room visits. Tizanidine 4 mg once daily at bedtime, Tramadol 50 mg daily help with remaining functional with chores, personal hygiene, remaining compliant with home exercise program, maintaining her quality of life, and performing activities of daily living. She obtains greater than 50% pain relief without any significant side effects. She is clear to avoid driving or operating heavy machinery or to perform any activities where she may harm herself or others while on pain medications. Neck pain is currently 7/10. Gets up to a 9/10. The pain is located in both sides of her neck. The pain is worse with work and activity. The pain is better with injections and pain medicine. It has been a constant ache for over 2 years. There are no other associated symptoms or contextual factors. She denies any classic radicular symptoms, new weakness, saddle anesthesia, bowel or bladder dysfunction, or falls.   She sometimes has some pain down into her right arm but no weakness in her arm. Low back pain is currently a 6/10. It gets up to a 9/10. Located primarily in her right low back. No current leg pain, no leg numbness or tingling. Constant ache for over 2 years. There are no other associated symptoms or contextual factors. She denies any classic radicular symptoms, new weakness, saddle anesthesia, bowel or bladder dysfunction, or falls. Right hand numbness is currently 2/10. Gets up to a 4/10. No weakness or clumsiness. Worse with long days at work. Better with rest.  Constant ache for over 3 months. Left hand is fine. No classic radicular symptoms from her neck. Shoulder feels fine. Carpal tunnel splint helps. There are no other associated symptoms or contextual factors. She denies any classic radicular symptoms, new weakness, saddle anesthesia, bowel or bladder dysfunction, or falls. Allergies:  Patient has no known allergies. Past Medical History:   Diagnosis Date    Colon polyposis 2013    multiple on colonoscopy, do [id number]    Depression     Esophageal web 2013    Fibromyalgia 2011    GERD (gastroesophageal reflux disease)     Hyperlipidemia     Hypothyroidism     Macular degeneration     left eye    Normal nuclear stress test 3/21/13     Past Surgical History:   Procedure Laterality Date     SECTION      CHOLECYSTECTOMY  13    Lapchole with gram    COLONOSCOPY  2013    HERNIA REPAIR      HYSTERECTOMY      still with one ovary    UPPER GASTROINTESTINAL ENDOSCOPY  2013     Family History   Problem Relation Age of Onset    Heart Disease Mother         COPD, she has cervical cancer as well.  Stroke Mother         No other fdr with cancer, no aneurysm or heart problems.  Diabetes Mother     Cancer Father         STOMACH, committed suicide    Other Sister         low thyroid     Other Brother         ?stroke, not verified.       Social History     Socioeconomic History    Marital status:      Spouse name: Not on file    Number of children: Not on file    Years of education: Not on file    Highest education level: Not on file   Occupational History    Occupation:      Employer: [employer]   Tobacco Use    Smoking status: Former Smoker     Packs/day: 0.25     Years: 35.00     Pack years: 8.75     Start date: 2013     Quit date: 2019     Years since quittin.3    Smokeless tobacco: Never Used   Vaping Use    Vaping Use: Former    Substances: Always   Substance and Sexual Activity    Alcohol use: No     Alcohol/week: 0.0 standard drinks    Drug use: Not Currently    Sexual activity: Yes     Partners: Male   Other Topics Concern    Not on file   Social History Narrative    Not on file     Social Determinants of Health     Financial Resource Strain: Low Risk     Difficulty of Paying Living Expenses: Not hard at all   Food Insecurity: No Food Insecurity    Worried About [address] in the Last Year: Never true    [address] [employer] in the Last Year: Never true   Transportation Needs:     Lack of Transportation (Medical):      Lack of Transportation (Non-Medical):    Physical Activity:     Days of Exercise per Week:     Minutes of Exercise per Session:    Stress:     Feeling of Stress :    Social Connections:     Frequency of Communication with Friends and Family:     Frequency of Social Gatherings with Friends and Family:     Attends Hindu Services:     Active Member of Clubs or Organizations:     Attends Club or Organization Meetings:     Marital Status:    Intimate Partner Violence:     Fear of Current or Ex-Partner:     Emotionally Abused:     Physically Abused:     Sexually Abused:        Current Outpatient Medications on File Prior to Visit   Medication Sig Dispense Refill    atorvastatin (LIPITOR) 20 MG tablet Take 1 tablet by mouth daily (CONTACT DOCTOR) 90 tablet 3    levothyroxine (SYNTHROID) 100 MCG tablet Take 1 tablet by mouth daily 90 tablet 3    escitalopram (LEXAPRO) 20 MG tablet Take 1 tablet by mouth daily 90 tablet 1    omeprazole (PRILOSEC) 40 MG capsule Take 1 capsule by mouth daily. 30 capsule 5     No current facility-administered medications on file prior to visit. She has not tried physical therapy. Date of lastof last PT treatment: none    Neck Pain   Associated symptoms include leg pain. Pertinent negatives include no fever or headaches. Hip Pain     Leg Pain     Shoulder Pain   Pertinent negatives include no fever. Review of Systems   Constitutional: Negative for fever. HENT: Negative for hearing loss. Respiratory: Negative for shortness of breath. Gastrointestinal: Negative for constipation, diarrhea and nausea. Genitourinary: Negative for difficulty urinating. Musculoskeletal: Positive for back pain and neck pain. Skin: Negative for rash. Neurological: Negative for headaches. Hematological: Does not bruise/bleed easily. Psychiatric/Behavioral: Negative for sleep disturbance. Objective:     Vitals:  Temp 97.3 °F (36.3 °C)   Ht 5' 4\" (1.626 m)   Wt 160 lb (72.6 kg)   BMI 27.46 kg/m² Pain Score:   8      Exam performed under coronavirus precautions  General: No acute distress  Eyes: No scleral icterus or lid lag appreciated bilaterally  ENMT: Moist mucous membranes. Hearing grossly intact bilaterally. No masses or lesions on ears or nose  Neck: Symmetric without any overt masses or lesions, trachea midline  Respiratory: Respirations are non-labored  Skin: Visualized skin is intact  Psych: Mood normal. Affect normal. Recent and remote memory intact. Judgment and insight normal.  Neurologic: Gait antalgic, no assistive devices for ambulation. Pt is alert and appropriately interactive. Pleasant and cooperative with history and exam. No signs of excessive sedation.  Responds promptly and appropriately to questions asked. No aberrant behaviors appreciated. Sensation grossly intact in both legs with improved right L5 paresthesias  Reflexes and strength functional for ambulation, no abnormal reflexes appreciated on exam today  Strength greater than 3/5 bilateral legs  Straight leg raise negative bilaterally. Sensation intact in both arms except for right median nerve paresthesias  Reflexes and strength functional for arm use, no abnormal reflexes appreciated on exam today  Strength greater than 3/5 in both arms  Spurling's negative on exam today    Right median nerve paresthesias with positive Tinel's at the right wrist    There is tenderness to palpation over the lower lumbar spinous processes from L2 down to sacrum with right worse than left paraspinal muscle tenderness. Rotation and extension reproduces axial low back pain. Other facet provocative maneuvers are positive. There is tenderness to palpation over cervical spinous processes from C4 down to T1 with bilateral cervical paraspinal muscle tenderness. Rotation and extension reproduces axial neck pain. Other facet provocative maneuvers are positive. EMG B LE 9/24/20: This study is normal. There is no current electrodiagnostic evidence for an active lumbosacral motor radiculopathy or generalized large fiber sensorimotor peripheral polyneuropathy. EMG B UE 9/25/20: This study is abnormal. There is current electrodiagnostic evidence for right median mononeuropathy at the wrist consistent with a clinical diagnosis of Right carpal tunnel syndrome. This is mild in degree by electrical criteria. There is no active denervation on electromyography. There is no current evidence for an active cervical motor radiculopathy or generalized large fiber sensorimotor peripheral polyneuropathy. XR L-spine 9/24/2020: Scoliosis, degenerative disease and facet arthropathy, no fracture, minimal change flexion-extension L4-5.   XR C-spine 9/24/2020: Degenerative disc disease GUIDE NEEDLE BIOPSY    NE INJECT CARPAL TUNNEL    traMADol (ULTRAM) 50 MG tablet    External Referral to Occupational Therapy   5. Lumbosacral spondylosis without myelopathy  tiZANidine (ZANAFLEX) 4 MG tablet    traMADol (ULTRAM) 50 MG tablet    Amb External Referral To Physical Therapy    NE INJ DX/THER AGNT PARAVERT FACET JOINT, LUMBAR/SAC, 1ST LEVEL    NE INJ DX/THER AGNT PARAVERT FACET JOINT, LUMBAR/SAC, 2ND LEVEL    NE INJ DX/THER AGNT PARAVERT FACET JOINT, LUMBAR/SAC, ADD LEVEL     +gastrititis, GERD, Depression and Anxiety on Xanax  -Gabapentin 600 mg cog dysfunction. Plan:     Periodic Controlled Substance Monitoring: Possible medication side effects, risk of tolerance/dependence & alternative treatments discussed., No signs of potential drug abuse or diversion identified. , Assessed functional status., Obtaining appropriate analgesic effect of treatment. Wilmer Perry MD)    Orders Placed This Encounter   Medications    tiZANidine (ZANAFLEX) 4 MG tablet     Sig: Take 1 tablet by mouth nightly as needed (pain)     Dispense:  30 tablet     Refill:  0    traMADol (ULTRAM) 50 MG tablet     Sig: Take 1 tablet by mouth daily as needed for Pain for up to 10 days.      Dispense:  10 tablet     Refill:  0     Reduce doses taken as pain becomes manageable       Orders Placed This Encounter   Procedures    MRI CERVICAL SPINE WO CONTRAST     Standing Status:   Future     Standing Expiration Date:   11/17/2021     Order Specific Question:   Reason for exam:     Answer:   eval canal stenosis    Urine Drug Screen    Amb External Referral To Physical Therapy     Referral Priority:   Routine     Referral Type:   Consult for Advice and Opinion     Referral Reason:   Specialty Services Required     Requested Specialty:   Physical Therapy     Number of Visits Requested:   1    External Referral to Occupational Therapy     Referral Priority:   Routine     Referral Type:   Eval and Treat     Referral Reason:   Specialty Services Required     Requested Specialty:   Occupational Therapy     Number of Visits Requested:   1    VA SONO GUIDE NEEDLE BIOPSY     Standing Status:   Future     Standing Expiration Date:   11/16/2021    VA INJECT CARPAL TUNNEL     Right carpal tunnel inj under US with Dr María Elena Moreno. 10 minute procedure. Standing Status:   Future     Standing Expiration Date:   11/16/2021    VA INJ DX/THER AGNT PARAVERT FACET JOINT, CERV/THORAC, 1ST LEVEL     Bilateral Cervical C5/6 C6/7 C7/T1 cervical facet inj under Xr with Dr María Elena Moreno. +Asa 81 mg okay, no antibiotics, no diabetes mellitus, osteoporosis, no bleeding or platelet dysfunction, IV Dye okay, NKDA. Not pregnant. 30 minute procedure. Standing Status:   Future     Standing Expiration Date:   11/16/2021    VA INJ DX/THER AGNT PARAVERT FACET JOINT, CERV/THORAC, 2ND LEVEL     Bilateral Cervical C5/6 C6/7 C7/T1 cervical facet inj under Xr with Dr María Elena Moreno. +Asa 81 mg okay, no antibiotics, no diabetes mellitus, osteoporosis, no bleeding or platelet dysfunction, IV Dye okay, NKDA. Not pregnant. 30 minute procedure. Standing Status:   Future     Standing Expiration Date:   11/16/2021    VA INJ DX/THER AGNT PARAVERT FACET JOINT, CERV/THORAC, ADD LEVEL     Bilateral Cervical C5/6 C6/7 C7/T1 cervical facet inj under Xr with Dr María Elena Moreno. +Asa 81 mg okay, no antibiotics, no diabetes mellitus, osteoporosis, no bleeding or platelet dysfunction, IV Dye okay, NKDA. Not pregnant. 30 minute procedure. Standing Status:   Future     Standing Expiration Date:   11/16/2021    VA INJ DX/THER AGNT PARAVERT FACET JOINT, LUMBAR/SAC, 1ST LEVEL     Right Lumbar L3/4 L4/5 L5/S1 facet joint inj under XR with Dr María Elena Moreno. +Asa81 mg okay, no antibiotics, no diabetes mellitus, not pregnant, no osteoporosis, no bleeding or platelet dysfunction, IV Dye okay, NKDA. 15 minute procedure.      Standing Status:   Future     Standing Expiration Date:   11/16/2021    VA INJ DX/THER AGNT PARAVERT FACET JOINT, LUMBAR/SAC, 2ND LEVEL     Right Lumbar L3/4 L4/5 L5/S1 facet joint inj under XR with Dr Syed Clifton. +Asa81 mg okay, no antibiotics, no diabetes mellitus, not pregnant, no osteoporosis, no bleeding or platelet dysfunction, IV Dye okay, NKDA. 15 minute procedure. Standing Status:   Future     Standing Expiration Date:   11/16/2021    DC INJ DX/THER AGNT PARAVERT FACET JOINT, LUMBAR/SAC, ADD LEVEL     Right Lumbar L3/4 L4/5 L5/S1 facet joint inj under XR with Dr Syed Clifton. +Asa81 mg okay, no antibiotics, no diabetes mellitus, not pregnant, no osteoporosis, no bleeding or platelet dysfunction, IV Dye okay, NKDA. 15 minute procedure. Standing Status:   Future     Standing Expiration Date:   11/16/2021     - Discussed the diagnosis, prognosis and treatment options with the patient today. All questions were answered. - Physical therapy lumbar radiculopathy, after completed consider therapy for cervical spondylosis. -Reviewed EMG BLE, EMG BUE, XR L-spine and XR C-spine above, all questions answered  -Due to persistent neck pain despite conservative treatment, unable to take NSAIDs given gastritis on endoscopy report, recommend:  -MRI cervical spine without contrast eval canal stenosis  -No NSAIDs given gastritis on endoscopy per patient report  - Restart tizanidine 4 mg once daily at bedtime #30, no refills, start 8/18/2021. Avoid all other muscle relaxers and/or sedating medicines. - Restart Tramadol 50 mg daily as-needed #30, no refills, start 8/18-8/28/21. [place] reviewed on 8/18/2021   -Naloxone prescribed on 4/21/21. MME less than 5  - Continue lidocaine 4% twice daily as-needed over-the-counter.   - Hold on Gabapentin 300 mg once daily at bedtime at this time.  -Right carpal tunnel inj under US with Dr Syed Clifton. 10 minute procedure. Consider 3 mg betamethasone  -Right Lumbar L3/4 L4/5 L5/S1 facet joint inj under XR with Dr Syed Clifton.  +Asa81 mg okay, no antibiotics, no diabetes mellitus, not pregnant, no osteoporosis, no bleeding or platelet dysfunction, IV Dye okay, NKDA. 15 minute procedure. Consider 3  mg betamethasone  -Bilateral Cervical C5/6 C6/7 C7/T1 cervical facet inj under Xr with Dr Ayden Esquivel. +Asa 81 mg okay, no antibiotics, no diabetes mellitus, osteoporosis, no bleeding or platelet dysfunction, IV Dye okay, NKDA. Not pregnant. 30 minute procedure. Consider 10 mg dexamethasone.  -Sparing use of back brace  -UDS today. Tramadol used 1 week ago    Controlled Substance Monitoring:    Acute and Chronic Pain Monitoring:   RX Monitoring 8/18/2021   Attestation -   Periodic Controlled Substance Monitoring Possible medication side effects, risk of tolerance/dependence & alternative treatments discussed. ;No signs of potential drug abuse or diversion identified. ;Assessed functional status. ;Obtaining appropriate analgesic effect of treatment. Discussed the risks, side effects, and symptoms that would warrant urgent or emergent physician evaluation of all medications prescribed today. Discussed the risks of the above recommended procedures including but not limited to bleeding, infection, worsened pain, damage to surrounding structures, side effects, toxicity, allergic reactions to medications used, immune and stress-response dysfunction, fat necrosis, skin pigmentation changes, blood sugar elevation, headache, vision changes, need for surgery, as well as catastrophic injury such as vision loss, paralysis, stroke, spinal cord and/or plexus infarction or injury, intrathecal injection, spinal cord puncture, arachnoiditis, discitis, bowel or bladder incontinence, ventilator dependence, loss of use of the arms and/or legs, and death. Discussed the risks, benefits, alternative procedures, and alternatives to the procedure including no procedure at all. Discussed that we cannot undo any permanent neurologic damage or change the course of any underlying disease.  After thorough discussion, patient expressed understanding and willingness to proceed. Discussed the risks of the above recommended procedures including but not limited to bleeding, infection, worsened pain, damage to surrounding structures, side effects, toxicity, allergic reactions to medications used, immune and stress-response dysfunction, fat necrosis, decreased bone mineralization, cartilage loss, increased fracture risk, avascular necrosis, skin pigmentation changes, blood sugar elevation, need for surgery, premature damage or degeneration of the joint, as well as catastrophic injury such as vision loss, paralysis, stroke, bowel or bladder incontinence, ventilator dependence, loss of use of the joint and/or extremity, and death. Discussed the risks, benefits, alternative procedures, and alternatives to the procedure including no procedure at all. Discussed that we cannot undo any permanent neurologic or orthopaedic damage or change the course of any underlying disease. After thorough discussion, patient expressed complete understanding and willingness to proceed.     Provided education and counseling regarding the diagnosis, prognosis, and treatment options. All questions were answered. Encouraged her to follow-up with her primary care physician and/or specialists as required for her overall health and management of her comorbidities as well as any new positive symptoms mentioned in review of systems above. Care was provided within the definitions and limitations of our specialty practice. Encouraged lifestyle interventions including healthy habits, lifestyle changes, regular aerobic exercise and appropriate weight maintenance as advised by their primary care physician or cardiovascular health provider.  Discussed well care and disease prevention/maintenance.      All recommendations for medications are meant to help decrease pain, improve function with activities of daily living, maintain compliance with home exercise program, and improve quality of life. All recommendations for therapeutic injections are meant to help decrease pain, improve function with activities of daily living, maintain compliance with home exercise program, improve quality of life, and decrease reliance upon oral medications.      Encouraged compliance with her home exercise program. Informed her to wear bracing as appropriate, and that bracing is not a replacement for core strengthening or muscle stabilization.     Discussed the elevated risks of excessive sedation while on pain medications. Advised her against driving or operating heavy machinery or performing any activities where she may harm herself or others while on pain medications. Particular caution was emphasized especially during dose adjustments and medication changes. Discussed the elevated risks of respiratory depression and death while on opioid medications, especially when combined with other sedative substances. Discussed side effects of opioids including, but not limited to, itching, constipation, nausea, and vomiting. The patient does not demonstrate any overt signs of alcohol or drug abuse, and there are no potential contraindications to the use of controlled substances. The relevant previous medical records were reviewed. The patient continues to make good-esteban efforts to reduce and eliminate use of opioids through our comprehensive multidisciplinary pain treatment program.     Discussed the risks of temporary disability, permanent disability, morbidity, and mortality with poorly-managed or undiagnosed medical conditions and comorbidities. Emphasized the importance of timely medical evaluation and treatment as previously recommended by us or other medical professionals.  Risks of not pursing these recommendations were emphasized.     Advised her that any lab testing, imaging, or other diagnostic test results are best discussed in person in the office so that we can provide a clear explanation of their significance and best treatment based upon these results. It is her responsibility to make and keep a follow up appointment to discuss these test results in person. She expressed complete understanding and agreement with the entire plan as outlined above. Portions of this note may have been typed, auto-populated, dictated or transcribed by voice recognition resulting in errors, omissions, or close substitutions which may be missed despite careful proofreading. Please contact the author for any questions or concerns. This note was not reviewed to expedite clinical care. Follow up:  Return in about 1 month (around 9/18/2021) for reassessment of pain and symptoms, P.T. External Ref.     Drew Jansen MD

## 2021-08-19 ENCOUNTER — TELEPHONE (OUTPATIENT)
Dept: PAIN MANAGEMENT | Age: 61
End: 2021-08-19

## 2021-08-19 ENCOUNTER — OFFICE VISIT (OUTPATIENT)
Dept: FAMILY MEDICINE CLINIC | Age: 61
End: 2021-08-19
Payer: COMMERCIAL

## 2021-08-19 VITALS
OXYGEN SATURATION: 98 % | SYSTOLIC BLOOD PRESSURE: 123 MMHG | RESPIRATION RATE: 15 BRPM | BODY MASS INDEX: 27.66 KG/M2 | TEMPERATURE: 98.7 F | HEIGHT: 64 IN | DIASTOLIC BLOOD PRESSURE: 74 MMHG | HEART RATE: 71 BPM | WEIGHT: 162 LBS

## 2021-08-19 DIAGNOSIS — F41.9 ANXIETY: Primary | ICD-10-CM

## 2021-08-19 DIAGNOSIS — Z12.4 SCREENING FOR CERVICAL CANCER: ICD-10-CM

## 2021-08-19 DIAGNOSIS — R07.9 INTERMITTENT CHEST PAIN: ICD-10-CM

## 2021-08-19 DIAGNOSIS — R41.840 UNABLE TO CONCENTRATE: ICD-10-CM

## 2021-08-19 DIAGNOSIS — Z12.31 ENCOUNTER FOR SCREENING MAMMOGRAM FOR MALIGNANT NEOPLASM OF BREAST: ICD-10-CM

## 2021-08-19 DIAGNOSIS — E78.5 HYPERLIPIDEMIA, UNSPECIFIED HYPERLIPIDEMIA TYPE: ICD-10-CM

## 2021-08-19 DIAGNOSIS — E03.9 HYPOTHYROIDISM, UNSPECIFIED TYPE: ICD-10-CM

## 2021-08-19 PROCEDURE — 93000 ELECTROCARDIOGRAM COMPLETE: CPT | Performed by: INTERNAL MEDICINE

## 2021-08-19 PROCEDURE — 99214 OFFICE O/P EST MOD 30 MIN: CPT | Performed by: INTERNAL MEDICINE

## 2021-08-19 RX ORDER — POLYETHYLENE GLYCOL 3350 17 G/17G
17 POWDER, FOR SOLUTION ORAL DAILY
Qty: 1 BOTTLE | Refills: 1 | Status: SHIPPED | OUTPATIENT
Start: 2021-08-19

## 2021-08-19 SDOH — ECONOMIC STABILITY: FOOD INSECURITY: WITHIN THE PAST 12 MONTHS, THE FOOD YOU BOUGHT JUST DIDN'T LAST AND YOU DIDN'T HAVE MONEY TO GET MORE.: NEVER TRUE

## 2021-08-19 SDOH — ECONOMIC STABILITY: FOOD INSECURITY: WITHIN THE PAST 12 MONTHS, YOU WORRIED THAT YOUR FOOD WOULD RUN OUT BEFORE YOU GOT MONEY TO BUY MORE.: NEVER TRUE

## 2021-08-19 ASSESSMENT — ENCOUNTER SYMPTOMS
EYE PAIN: 0
BACK PAIN: 0
ABDOMINAL PAIN: 0
SHORTNESS OF BREATH: 0

## 2021-08-19 ASSESSMENT — PATIENT HEALTH QUESTIONNAIRE - PHQ9
SUM OF ALL RESPONSES TO PHQ QUESTIONS 1-9: 0
1. LITTLE INTEREST OR PLEASURE IN DOING THINGS: 0
SUM OF ALL RESPONSES TO PHQ QUESTIONS 1-9: 0
SUM OF ALL RESPONSES TO PHQ QUESTIONS 1-9: 0
SUM OF ALL RESPONSES TO PHQ9 QUESTIONS 1 & 2: 0
2. FEELING DOWN, DEPRESSED OR HOPELESS: 0

## 2021-08-19 ASSESSMENT — SOCIAL DETERMINANTS OF HEALTH (SDOH): HOW HARD IS IT FOR YOU TO PAY FOR THE VERY BASICS LIKE FOOD, HOUSING, MEDICAL CARE, AND HEATING?: NOT HARD AT ALL

## 2021-08-19 NOTE — TELEPHONE ENCOUNTER
ORDER PLACED:    Date: 8/18/21  Description: RIGHT L3-4,L4-5,L5-S1 FACET  Order Number: [id number]  Ordering Provider: [facility]  Performing Provider: [facility]  CPT Codes: 79722,07712,29361  ICD10 Codes: T40.181

## 2021-08-19 NOTE — TELEPHONE ENCOUNTER
Left message on personal voicemail for patient to call. We have obtained authorization for the injection with Dr. Benjy Morse.   BILAT C5-6,C6-7,C7-T1 FACET     NO AUTH REQUIRED

## 2021-08-19 NOTE — PATIENT INSTRUCTIONS
Patient Education        Hypothyroidism: Care Instructions  Your Care Instructions     When you have hypothyroidism, your body doesn't make enough thyroid hormone. This hormone helps your body use energy. If your thyroid level is low, you may feel tired, be constipated, have an increase in your blood pressure, or have dry skin or memory problems. You may also get cold easily, even when it is warm. Women with low thyroid levels may have heavy menstrual periods. A blood test to find your thyroid-stimulating hormone (TSH) level is used to check for hypothyroidism. A high TSH level may mean that you have it. The treatment for hypothyroidism is thyroid hormone pills. You should start to feel better in 1 to 2 weeks. Most people need treatment for the rest of their lives. You will need regular visits with your doctor to make sure you are doing well and that you have the right dose of medicine. Follow-up care is a key part of your treatment and safety. Be sure to make and go to all appointments, and call your doctor if you are having problems. It's also a good idea to know your test results and keep a list of the medicines you take. How can you care for yourself at home? · Take your thyroid hormone medicine exactly as prescribed. Call your doctor if you think you are having a problem with your medicine. Most people do not have side effects if they take the right amount of medicine regularly. ? Take the medicine 30 minutes before breakfast, and do not take it with calcium, vitamins, or iron. ? Do not take extra doses of your thyroid medicine. It will not help you get better any faster, and it may cause side effects. ? If you forget to take a dose, do NOT take a double dose of medicine. Take your usual dose the next day. · Tell your doctor about all prescription, herbal, or over-the-counter products you take. · Take care of yourself. Eat a healthy diet, get enough sleep, and get regular exercise.   When should you call for help? Call 911 anytime you think you may need emergency care. For example, call if:    · You passed out (lost consciousness).     · You have severe trouble breathing.     · You have a very slow heartbeat (less than 60 beats a minute).     · You have a low body temperature (95°F or below). Call your doctor now or seek immediate medical care if:    · You feel tired, sluggish, or weak.     · You have trouble remembering things or concentrating.     · You do not begin to feel better 2 weeks after starting your medicine. Watch closely for changes in your health, and be sure to contact your doctor if you have any problems. Where can you learn more? Go to [email or web address] and sign in to your [employer] account. Enter Z171 in the [employer] box to learn more about \"Hypothyroidism: Care Instructions. \"     If you do not have an account, please click on the \"Sign Up Now\" link. Current as of: March 31, 2020               Content Version: 12.9  © 9222-1103 Healthwise, Incorporated. Care instructions adapted under license by [facility] ([facility]). If you have questions about a medical condition or this instruction, always ask your healthcare professional. [address] any warranty or liability for your use of this information.

## 2021-08-19 NOTE — TELEPHONE ENCOUNTER
BILAT C5-6,C6-7,C7-T1 FACET    NO AUTH REQUIRED    OK to schedule procedure approved as above. Please note sides/levels approved and date range. (If applicable, sides/levels approved may differ from those ordered)    TO BE SCHEDULED WITH DR. Konrad Davis

## 2021-08-19 NOTE — TELEPHONE ENCOUNTER
BENEFITS:    Insurance: [address]  Phone: [phone]  Contact Name: Niko Carroll   Effective Date: 1-1-2020     Plan year: CALENDAR YEAR  Deductible: $3,000     Deductible Met: $1,047.02  Allowed/benefits paid at: 90% AFTER DED  OOP: $6,000 MET: $1,047.02  Freq Limits: N/A  Prior Auth Requirement: NO AUTH REQUIRED    Notes:     Call Reference #Halley Villar 8-19-21    Time of call: 10:55AM

## 2021-08-19 NOTE — TELEPHONE ENCOUNTER
RIGHT L3-4,L4-5,L5-S1 FACET    NO AUTH REQUIRED    OK to schedule procedure approved as above. Please note sides/levels approved and date range. (If applicable, sides/levels approved may differ from those ordered)    TO BE SCHEDULED WITH DR. Norbert Herrera

## 2021-08-19 NOTE — PROGRESS NOTES
Subjective:      Patient ID: Flip Thomas is a 62 y.o. female who presents today with:  Chief Complaint   Patient presents with   [address] of chest  [address] [place] [address] of chest pain  Better with a bowel movement  Intermittent  High cholesterol  Lasts for a \"while\"       Past Medical History:   Diagnosis Date    Colon polyposis 2013    multiple on colonoscopy, do [id number]    Depression     Esophageal web 2013    Fibromyalgia 2011    GERD (gastroesophageal reflux disease)     Hyperlipidemia     Hypothyroidism     Macular degeneration     left eye    Normal nuclear stress test 3/21/13     Past Surgical History:   Procedure Laterality Date     SECTION      CHOLECYSTECTOMY  13    Lapchole with gram    COLONOSCOPY  2013    HERNIA REPAIR      HYSTERECTOMY      still with one ovary    UPPER GASTROINTESTINAL ENDOSCOPY  2013     Social History     Socioeconomic History    Marital status:      Spouse name: Not on file    Number of children: Not on file    Years of education: Not on file    Highest education level: Not on file   Occupational History    Occupation:      Employer: [employer]   Tobacco Use    Smoking status: Former Smoker     Packs/day: 0.25     Years: 35.00     Pack years: 8.75     Start date: 2013     Quit date: 2019     Years since quittin.3    Smokeless tobacco: Never Used   Vaping Use    Vaping Use: Former    Substances: Always   Substance and Sexual Activity    Alcohol use: No     Alcohol/week: 0.0 standard drinks    Drug use: Not Currently    Sexual activity: Yes     Partners: Male   Other Topics Concern    Not on file   Social History Narrative    Not on file     Social Determinants of Health     Financial Resource Strain: Low Risk     Difficulty of Paying Living Expenses: Not hard at all   Food Insecurity: No Food Insecurity    Worried About [facility] in the Last Year: Never true    Ran Out of Food in the Last Year: Never true   Transportation Needs:     Lack of Transportation (Medical):  Lack of Transportation (Non-Medical):    Physical Activity:     Days of Exercise per Week:     Minutes of Exercise per Session:    Stress:     Feeling of Stress :    Social Connections:     Frequency of Communication with Friends and Family:     Frequency of Social Gatherings with Friends and Family:     Attends Muslim Services:     Active Member of Clubs or Organizations:     Attends Club or Organization Meetings:     Marital Status:    Intimate Partner Violence:     Fear of Current or Ex-Partner:     Emotionally Abused:     Physically Abused:     Sexually Abused:      No Known Allergies  Current Outpatient Medications on File Prior to Visit   Medication Sig Dispense Refill    tiZANidine (ZANAFLEX) 4 MG tablet Take 1 tablet by mouth nightly as needed (pain) 30 tablet 0    traMADol (ULTRAM) 50 MG tablet Take 1 tablet by mouth daily as needed for Pain for up to 10 days. 10 tablet 0    atorvastatin (LIPITOR) 20 MG tablet Take 1 tablet by mouth daily (CONTACT DOCTOR) 90 tablet 3    levothyroxine (SYNTHROID) 100 MCG tablet Take 1 tablet by mouth daily 90 tablet 3    escitalopram (LEXAPRO) 20 MG tablet Take 1 tablet by mouth daily 90 tablet 1    omeprazole (PRILOSEC) 40 MG capsule Take 1 capsule by mouth daily. 30 capsule 5     No current facility-administered medications on file prior to visit. I have personally reviewed the ROS, PMH, PFH, and social history     Review of Systems   Constitutional: Negative for chills and fever. HENT: Negative for congestion. Eyes: Negative for pain. Respiratory: Negative for shortness of breath. Cardiovascular: Negative for chest pain. Gastrointestinal: Negative for abdominal pain. Genitourinary: Negative for hematuria. Musculoskeletal: Negative for back pain.    Allergic/Immunologic: Negative for immunocompromised state. Neurological: Negative for headaches. Psychiatric/Behavioral: Negative for hallucinations. Objective:   /74 (Site: Right Upper Arm, Position: Sitting, Cuff Size: Large Adult)   Pulse 71   Temp 98.7 °F (37.1 °C) (Tympanic)   Resp 15   Ht 5' 4\" (1.626 m)   Wt 162 lb (73.5 kg)   SpO2 98%   BMI 27.81 kg/m²     Physical Exam  Constitutional:       General: She is not in acute distress. Appearance: Normal appearance. She is not ill-appearing, toxic-appearing or diaphoretic. HENT:      Head: Normocephalic. Neck:      Vascular: No carotid bruit. Cardiovascular:      Rate and Rhythm: Normal rate and regular rhythm. Pulses: Normal pulses. Heart sounds: Normal heart sounds. No murmur heard. No friction rub. No gallop. Pulmonary:      Effort: Pulmonary effort is normal. No respiratory distress. Breath sounds: Normal breath sounds. No wheezing, rhonchi or rales. Abdominal:      General: Abdomen is flat. There is no distension. Palpations: Abdomen is soft. Tenderness: There is no abdominal tenderness. There is no right CVA tenderness, left CVA tenderness, guarding or rebound. Musculoskeletal:      Cervical back: Neck supple. Right lower leg: No edema. Left lower leg: No edema. Skin:     General: Skin is warm. Findings: No erythema or rash. Neurological:      Mental Status: She is alert. Psychiatric:         Mood and Affect: Mood normal.           Assessment:       Diagnosis Orders   1. Anxiety  TSH with Reflex    Vitamin D 25 Hydroxy    CBC Auto Differential    Comprehensive Metabolic Panel    Hemoglobin A1C    Lipid Panel    Urine Reflex to Culture   2. Hypothyroidism, unspecified type  TSH with Reflex    Vitamin D 25 Hydroxy    CBC Auto Differential    Comprehensive Metabolic Panel    Hemoglobin A1C    Lipid Panel    Urine Reflex to Culture   3.  Hyperlipidemia, unspecified hyperlipidemia type  TSH with Reflex Vitamin D 25 Hydroxy    CBC Auto Differential    Comprehensive Metabolic Panel    Hemoglobin A1C    Lipid Panel    Urine Reflex to Culture   4. Intermittent chest pain  TSH with Reflex    Vitamin D 25 Hydroxy    CBC Auto Differential    Comprehensive Metabolic Panel    Hemoglobin A1C    Lipid Panel    EKG 12 lead    NM Myocardial Spect Rest Exercise or Rx    Urine Reflex to Culture    polyethylene glycol (MIRALAX) 17 GM/SCOOP powder   5. Unable to concentrate  [address], [address], Psychology, [place]    TSH with Reflex    Vitamin D 25 Hydroxy    CBC Auto Differential    Comprehensive Metabolic Panel    Hemoglobin A1C    Lipid Panel    Urine Reflex to Culture   6. Screening for cervical cancer  Laura Orr MD, [facility], [facility]   7. Encounter for screening mammogram for malignant neoplasm of breast  IRIS DIGITAL SCREEN W OR WO CAD BILATERAL         Plan:     Vc  No si/hi, Continue lexapro. Continue chronic medications  Get last pn from dr Ness Hamper   See orders   Might need to see GI. Stay on ppi. Might switch to crestor.                        Orders Placed This Encounter   Procedures    IRIS DIGITAL SCREEN W OR WO CAD BILATERAL     Standing Status:   Future     Standing Expiration Date:   10/19/2022     Order Specific Question:   Reason for exam:     Answer:   screening    NM Myocardial Spect Rest Exercise or Rx     Standing Status:   Future     Standing Expiration Date:   8/19/2022     Order Specific Question:   Reason for Exam?     Answer:    Other     Order Specific Question:   Procedure Type     Answer:   Rx    TSH with Reflex     Standing Status:   Future     Standing Expiration Date:   8/19/2022    Vitamin D 25 Hydroxy     Standing Status:   Future     Standing Expiration Date:   8/19/2022    CBC Auto Differential     Standing Status:   Future     Standing Expiration Date:   8/19/2022    Comprehensive Metabolic Panel     Standing Status:   Future     Standing Expiration Date:   8/19/2022  Hemoglobin A1C     Standing Status:   Future     Standing Expiration Date:   8/19/2022    Lipid Panel     Standing Status:   Future     Standing Expiration Date:   8/19/2022     Order Specific Question:   Is Patient Fasting?/# of Hours     Answer:   10    Urine Reflex to Culture     Standing Status:   Future     Standing Expiration Date:   8/19/2022     Order Specific Question:   SPECIFY(EX-CATH,MIDSTREAM,CYSTO,ETC)? Answer:   mid stream   [address], [address], Psychology, [place]     Referral Priority:   Routine     Referral Type:   Eval and Treat     Referral Reason:   Specialty Services Required     Referred to Provider:   Wilmer Holley PSYD     Requested Specialty:   Psychology     Number of Visits Requested:   1   Tony Jung MD, OB-GYN, [place]     Referral Priority:   Routine     Referral Type:   Eval and Treat     Referral Reason:   Specialty Services Required     Referred to Provider:   Ledy Ghosh MD     Requested Specialty:   Obstetrics & Gynecology     Number of Visits Requested:   1    EKG 12 lead     Standing Status:   Future     Standing Expiration Date:   10/18/2021     Order Specific Question:   Reason for Exam?     Answer: Other     Orders Placed This Encounter   Medications    polyethylene glycol (MIRALAX) 17 GM/SCOOP powder     Sig: Take 17 g by mouth daily     Dispense:  1 Bottle     Refill:  1   She will follow up with dr Iram Rizzo  If you get new or worsening sx, chest pains, jaw pain, problems, breathing call 911  Risk of mi. Refer to [facility] for adhd testing  If anything should change or worsen call ASAP, don't wait for next scheduled appointment. Return in about 4 weeks (around 9/16/2021) for Chronic condition management/appointment, worsening symptoms, call ASAP for appointment.       Maddy Ferrara MD

## 2021-08-19 NOTE — TELEPHONE ENCOUNTER
ORDER PLACED:    Date: 8/18/21  Description: Shayy Jeronimo C5-6,C6-7,C7-T1 FACET  Order Number: [id number]  Ordering Provider: [facility]  Performing Provider: [facility]  CPT Codes: 46472,84898,22224  ICD10 Codes: F50.508

## 2021-08-20 LAB
6-ACETYLMORPHINE, UR: NORMAL
AMPHETAMINE SCREEN, URINE: NORMAL
BARBITURATE SCREEN, URINE: NORMAL
BENZODIAZEPINE SCREEN, URINE: NORMAL
CANNABINOID SCREEN URINE: NORMAL
COCAINE METABOLITE, URINE: NORMAL
CREATININE URINE: NORMAL
EDDP, URINE: NORMAL
ETHANOL URINE: NORMAL
MDMA URINE: NORMAL
METHADONE SCREEN, URINE: NORMAL
METHAMPHETAMINE, URINE: NORMAL
OPIATES, URINE: NORMAL
OXYCODONE: NORMAL
PCP: NORMAL
PH, URINE: NORMAL
PHENCYCLIDINE, URINE: NORMAL
PROPOXYPHENE, URINE: NORMAL
TRICYCLIC ANTIDEPRESSANTS, UR: NORMAL

## 2021-08-25 ENCOUNTER — OFFICE VISIT (OUTPATIENT)
Dept: PAIN MANAGEMENT | Age: 61
End: 2021-08-25
Payer: COMMERCIAL

## 2021-08-25 DIAGNOSIS — G56.01 CARPAL TUNNEL SYNDROME OF RIGHT WRIST: ICD-10-CM

## 2021-08-25 PROCEDURE — 20526 THER INJECTION CARP TUNNEL: CPT | Performed by: PHYSICAL MEDICINE & REHABILITATION

## 2021-08-25 PROCEDURE — 76942 ECHO GUIDE FOR BIOPSY: CPT | Performed by: PHYSICAL MEDICINE & REHABILITATION

## 2021-08-25 RX ORDER — LIDOCAINE HYDROCHLORIDE 10 MG/ML
1 INJECTION, SOLUTION EPIDURAL; INFILTRATION; INTRACAUDAL; PERINEURAL ONCE
Status: COMPLETED | OUTPATIENT
Start: 2021-08-25 | End: 2021-08-25

## 2021-08-25 RX ORDER — BETAMETHASONE SODIUM PHOSPHATE AND BETAMETHASONE ACETATE 3; 3 MG/ML; MG/ML
3 INJECTION, SUSPENSION INTRA-ARTICULAR; INTRALESIONAL; INTRAMUSCULAR; SOFT TISSUE ONCE
Status: COMPLETED | OUTPATIENT
Start: 2021-08-25 | End: 2021-08-25

## 2021-08-25 RX ADMIN — BETAMETHASONE SODIUM PHOSPHATE AND BETAMETHASONE ACETATE 3 MG: 3; 3 INJECTION, SUSPENSION INTRA-ARTICULAR; INTRALESIONAL; INTRAMUSCULAR; SOFT TISSUE at 09:19

## 2021-08-25 RX ADMIN — LIDOCAINE HYDROCHLORIDE 1 ML: 10 INJECTION, SOLUTION EPIDURAL; INFILTRATION; INTRACAUDAL; PERINEURAL at 09:20

## 2021-08-25 NOTE — PROGRESS NOTES
Carpal Tunnel Corticosteroid Injection    Patient Name: Estrella Pisano   : 1960  Date: 2021     Provider: Funmi Ho MD        PROCEDURE: Right carpal tunnel corticosteroid injection under ultrasound guidance    Description of Procedure: The site was marked. A time-out was performed. Ultrasound was used to visualize the pertinent anatomy and identify any critical neurovascular structures. The site was then prepped in a sterile manner with Betadine three times and alcohol. Then a 27-gauge 1.5 inch needle was advanced under direct ultrasound guidance up to the median nerve. Aspiration for blood was negative. The patient was able to flex and extend the fingers without any needle movement. Then a 1 mL injectate containing 0.5 mL of 3 mg of betamethasone and 1 mL of 1% preservative-free lidocaine was injected without resistance or difficulty. Fair spread of the injectate was directly visualized under ultrasound. The needle was flushed and removed. The site was hemostatic. The site was cleaned and appropriately dressed. The patient tolerated the procedure well. There were no immediate post procedure complications. Post procedure instructions were given. The patient will follow-up as previously instructed. She will continue anticoagulation uninterrupted.            [address] [facility] [address]  Phone [phone]/[facility] [phone]

## 2021-09-08 ENCOUNTER — OFFICE VISIT (OUTPATIENT)
Dept: BEHAVIORAL/MENTAL HEALTH CLINIC | Age: 61
End: 2021-09-08
Payer: COMMERCIAL

## 2021-09-08 ENCOUNTER — PROCEDURE VISIT (OUTPATIENT)
Dept: PAIN MANAGEMENT | Age: 61
End: 2021-09-08
Payer: COMMERCIAL

## 2021-09-08 DIAGNOSIS — M47.817 LUMBOSACRAL SPONDYLOSIS WITHOUT MYELOPATHY: ICD-10-CM

## 2021-09-08 DIAGNOSIS — F41.9 ANXIETY DISORDER, UNSPECIFIED TYPE: ICD-10-CM

## 2021-09-08 DIAGNOSIS — Z13.39 ATTENTION DEFICIT HYPERACTIVITY DISORDER (ADHD) EVALUATION: ICD-10-CM

## 2021-09-08 DIAGNOSIS — R41.840 ATTENTION AND CONCENTRATION DEFICIT: Primary | ICD-10-CM

## 2021-09-08 PROCEDURE — 90791 PSYCH DIAGNOSTIC EVALUATION: CPT | Performed by: PSYCHOLOGIST

## 2021-09-08 PROCEDURE — 64493 INJ PARAVERT F JNT L/S 1 LEV: CPT | Performed by: PHYSICAL MEDICINE & REHABILITATION

## 2021-09-08 PROCEDURE — 64494 INJ PARAVERT F JNT L/S 2 LEV: CPT | Performed by: PHYSICAL MEDICINE & REHABILITATION

## 2021-09-08 PROCEDURE — 64495 INJ PARAVERT F JNT L/S 3 LEV: CPT | Performed by: PHYSICAL MEDICINE & REHABILITATION

## 2021-09-08 RX ORDER — LIDOCAINE HYDROCHLORIDE 10 MG/ML
5 INJECTION, SOLUTION EPIDURAL; INFILTRATION; INTRACAUDAL; PERINEURAL ONCE
Status: COMPLETED | OUTPATIENT
Start: 2021-09-08 | End: 2021-09-08

## 2021-09-08 RX ORDER — BETAMETHASONE SODIUM PHOSPHATE AND BETAMETHASONE ACETATE 3; 3 MG/ML; MG/ML
3 INJECTION, SUSPENSION INTRA-ARTICULAR; INTRALESIONAL; INTRAMUSCULAR; SOFT TISSUE ONCE
Status: COMPLETED | OUTPATIENT
Start: 2021-09-08 | End: 2021-09-08

## 2021-09-08 RX ADMIN — BETAMETHASONE SODIUM PHOSPHATE AND BETAMETHASONE ACETATE 3 MG: 3; 3 INJECTION, SUSPENSION INTRA-ARTICULAR; INTRALESIONAL; INTRAMUSCULAR; SOFT TISSUE at 14:39

## 2021-09-08 RX ADMIN — Medication 0.5 MEQ: at 14:38

## 2021-09-08 RX ADMIN — LIDOCAINE HYDROCHLORIDE 5 ML: 10 INJECTION, SOLUTION EPIDURAL; INFILTRATION; INTRACAUDAL; PERINEURAL at 14:39

## 2021-09-08 ASSESSMENT — ANXIETY QUESTIONNAIRES
2. NOT BEING ABLE TO STOP OR CONTROL WORRYING: 1
IF YOU CHECKED OFF ANY PROBLEMS ON THIS QUESTIONNAIRE, HOW DIFFICULT HAVE THESE PROBLEMS MADE IT FOR YOU TO DO YOUR WORK, TAKE CARE OF THINGS AT HOME, OR GET ALONG WITH OTHER PEOPLE: SOMEWHAT DIFFICULT
6. BECOMING EASILY ANNOYED OR IRRITABLE: 2
GAD7 TOTAL SCORE: 11
1. FEELING NERVOUS, ANXIOUS, OR ON EDGE: 1
4. TROUBLE RELAXING: 3
7. FEELING AFRAID AS IF SOMETHING AWFUL MIGHT HAPPEN: 1
5. BEING SO RESTLESS THAT IT IS HARD TO SIT STILL: 2
3. WORRYING TOO MUCH ABOUT DIFFERENT THINGS: 1

## 2021-09-08 ASSESSMENT — PATIENT HEALTH QUESTIONNAIRE - PHQ9
9. THOUGHTS THAT YOU WOULD BE BETTER OFF DEAD, OR OF HURTING YOURSELF: 0
5. POOR APPETITE OR OVEREATING: 1
SUM OF ALL RESPONSES TO PHQ QUESTIONS 1-9: 11
3. TROUBLE FALLING OR STAYING ASLEEP: 3
8. MOVING OR SPEAKING SO SLOWLY THAT OTHER PEOPLE COULD HAVE NOTICED. OR THE OPPOSITE, BEING SO FIGETY OR RESTLESS THAT YOU HAVE BEEN MOVING AROUND A LOT MORE THAN USUAL: 1
SUM OF ALL RESPONSES TO PHQ QUESTIONS 1-9: 11
4. FEELING TIRED OR HAVING LITTLE ENERGY: 2
SUM OF ALL RESPONSES TO PHQ QUESTIONS 1-9: 11
7. TROUBLE CONCENTRATING ON THINGS, SUCH AS READING THE NEWSPAPER OR WATCHING TELEVISION: 3
2. FEELING DOWN, DEPRESSED OR HOPELESS: 0
6. FEELING BAD ABOUT YOURSELF - OR THAT YOU ARE A FAILURE OR HAVE LET YOURSELF OR YOUR FAMILY DOWN: 0
SUM OF ALL RESPONSES TO PHQ9 QUESTIONS 1 & 2: 1
1. LITTLE INTEREST OR PLEASURE IN DOING THINGS: 1

## 2021-09-08 ASSESSMENT — COLUMBIA-SUICIDE SEVERITY RATING SCALE - C-SSRS
2. HAVE YOU ACTUALLY HAD ANY THOUGHTS OF KILLING YOURSELF?: NO
1. WITHIN THE PAST MONTH, HAVE YOU WISHED YOU WERE DEAD OR WISHED YOU COULD GO TO SLEEP AND NOT WAKE UP?: NO
6. HAVE YOU EVER DONE ANYTHING, STARTED TO DO ANYTHING, OR PREPARED TO DO ANYTHING TO END YOUR LIFE?: NO
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
## 2021-09-08 NOTE — PATIENT INSTRUCTIONS
Below are two copies of the same measure- please complete one of the measures about yourself for the past 6 months or more. Please have someone who knows you very well complete the measure about you for the past 6 months or more.                                 Please complete the below measure about you when you were school aged (elementary through high school) as best you remember:

## 2021-09-08 NOTE — PROGRESS NOTES
LUMBAR FACET ZYGAPOPHYSEAL JOINT INJECTIONS             Patient Name: Alex Howell   : 1960  Date: 2021     Provider: Darrius Jacobsen MD      Alex Howell is here today for interventional pain management. Preoperatively, the patient presents with symptoms and physical exam findings consistent with lumbar facet zygapophyseal joint mediated pain. She has had persistent pain that limits her function and activities of daily living. The pain is persistent despite conservative measures. She has significant functional and psychological impairment due to this condition. Given her symptoms, physical exam findings, impairment in activities of daily living, and lack of response to conservative measures, consideration for lumbar facet zygapophyseal joint corticosteroid injections was given. Discussed the risks of the procedure including, but not limited to, bleeding, infection, worsened pain, damage to surrounding structures, side effects, toxicity, allergic reactions to medications used, immune and stress-response dysfunction, fat necrosis, avascular necrosis, skin pigmentation changes, blood sugar elevation, headache, vision changes, need for surgery, as well as catastrophic injury such as vision loss, paralysis, stroke, spinal cord infarction or injury, intrathecal injection, spinal cord puncture, arachnoiditis, bowel or bladder incontinence, loss of use of the legs, ventilator dependence, and death. Discussed the risks, benefits, alternative procedures, and alternatives to the procedure including no procedure at all. Discussed that we cannot undo any permanent neurologic damage or change the course of any underlying disease. After thorough discussion, patient expressed understanding and willingness to proceed. Written consent was obtained and is in the chart. Verbal consent to proceed was obtained. Standard ASI guidelines were followed and sterile technique used.   Area was cleaned with Betadine three times. Fluoroscopic guidance was used for this procedure. The L5 vertebral body was taken as the first lumbar-appearing vertebral body directly above the sacrum on a lateral view. Appropriate oblique views were used to open up the facet joints and three 26 gauge 3.5 inch spinal needles were used and each needle was advanced to each facet joint. Negative aspiration was achieved. Oblique and lateral views were obtained. A 1.5 mL injectate containing 0.5 mL of 3 mg of betamethasone and 1 mL of 1% preservative free Lidocaine was equally divided and injected into the joints without difficulty. She tolerated the procedure well, no obvious complications occurred during the procedure. She was appropriately monitored and discharged home in stable condition with her usual motor strength. Postoperative instructions were provided. She will continue anticoagulation uninterrupted.              [] Bilateral [] T12-L1    [] L1-2   [x] Right [] L2-3    [x] L3-4   [] Left [x] L4-5      [x] L5-S1              [address] [facility] [address]  Phone [phone]/[employer] [phone]

## 2021-09-16 ENCOUNTER — OFFICE VISIT (OUTPATIENT)
Dept: FAMILY MEDICINE CLINIC | Age: 61
End: 2021-09-16
Payer: COMMERCIAL

## 2021-09-16 VITALS
HEIGHT: 64 IN | OXYGEN SATURATION: 97 % | DIASTOLIC BLOOD PRESSURE: 73 MMHG | SYSTOLIC BLOOD PRESSURE: 122 MMHG | RESPIRATION RATE: 15 BRPM | WEIGHT: 162 LBS | BODY MASS INDEX: 27.66 KG/M2 | TEMPERATURE: 98.1 F | HEART RATE: 60 BPM

## 2021-09-16 DIAGNOSIS — E03.9 HYPOTHYROIDISM, UNSPECIFIED TYPE: ICD-10-CM

## 2021-09-16 DIAGNOSIS — E78.5 HYPERLIPIDEMIA, UNSPECIFIED HYPERLIPIDEMIA TYPE: Primary | ICD-10-CM

## 2021-09-16 PROCEDURE — 99213 OFFICE O/P EST LOW 20 MIN: CPT | Performed by: INTERNAL MEDICINE

## 2021-09-16 RX ORDER — UBIDECARENONE 75 MG
50 CAPSULE ORAL DAILY
COMMUNITY

## 2021-09-16 RX ORDER — ROSUVASTATIN CALCIUM 10 MG/1
10 TABLET, COATED ORAL NIGHTLY
Qty: 30 TABLET | Refills: 0 | Status: SHIPPED | OUTPATIENT
Start: 2021-09-16 | End: 2021-10-22 | Stop reason: DRUGHIGH

## 2021-09-16 ASSESSMENT — ENCOUNTER SYMPTOMS
ABDOMINAL PAIN: 0
EYE PAIN: 0
SHORTNESS OF BREATH: 0
BACK PAIN: 0

## 2021-09-16 NOTE — PROGRESS NOTES
at all   Food Insecurity: No Food Insecurity    Worried About [address] [facility] in the Last Year: Never true    [employer] in the Last Year: Never true   Transportation Needs:     Lack of Transportation (Medical):  Lack of Transportation (Non-Medical):    Physical Activity:     Days of Exercise per Week:     Minutes of Exercise per Session:    Stress:     Feeling of Stress :    Social Connections:     Frequency of Communication with Friends and Family:     Frequency of Social Gatherings with Friends and Family:     Attends Confucianist Services:     Active Member of Clubs or Organizations:     Attends Club or Organization Meetings:     Marital Status:    Intimate Partner Violence:     Fear of Current or Ex-Partner:     Emotionally Abused:     Physically Abused:     Sexually Abused:      No Known Allergies  Current Outpatient Medications on File Prior to Visit   Medication Sig Dispense Refill    Cholecalciferol (VITAMIN D3) 125 MCG (5000 UT) TABS Take by mouth Take one tablet po once daily four days out of the week      vitamin B-12 (CYANOCOBALAMIN) 100 MCG tablet Take 50 mcg by mouth daily      polyethylene glycol (MIRALAX) 17 GM/SCOOP powder Take 17 g by mouth daily 1 Bottle 1    tiZANidine (ZANAFLEX) 4 MG tablet Take 1 tablet by mouth nightly as needed (pain) 30 tablet 0    levothyroxine (SYNTHROID) 100 MCG tablet Take 1 tablet by mouth daily 90 tablet 3    escitalopram (LEXAPRO) 20 MG tablet Take 1 tablet by mouth daily 90 tablet 1    omeprazole (PRILOSEC) 40 MG capsule Take 1 capsule by mouth daily. 30 capsule 5     No current facility-administered medications on file prior to visit. I have personally reviewed the ROS, PMH, PFH, and social history     Review of Systems   Constitutional: Negative for chills and fever. HENT: Negative for congestion. Eyes: Negative for pain. Respiratory: Negative for shortness of breath. Cardiovascular: Negative for chest pain. Gastrointestinal: Negative for abdominal pain. Genitourinary: Negative for hematuria. Musculoskeletal: Negative for back pain. Allergic/Immunologic: Negative for immunocompromised state. Neurological: Negative for headaches. Psychiatric/Behavioral: Negative for hallucinations. Objective:   /73 (Site: Right Upper Arm, Position: Sitting, Cuff Size: Large Adult)   Pulse 60   Temp 98.1 °F (36.7 °C) (Tympanic)   Resp 15   Ht 5' 4\" (1.626 m)   Wt 162 lb (73.5 kg)   SpO2 97%   BMI 27.81 kg/m²     Physical Exam  Constitutional:       General: She is not in acute distress. Appearance: Normal appearance. She is not ill-appearing, toxic-appearing or diaphoretic. HENT:      Head: Normocephalic. Neck:      Vascular: No carotid bruit. Cardiovascular:      Rate and Rhythm: Normal rate and regular rhythm. Pulses: Normal pulses. Heart sounds: Normal heart sounds. No murmur heard. No friction rub. No gallop. Pulmonary:      Effort: Pulmonary effort is normal. No respiratory distress. Breath sounds: Normal breath sounds. No wheezing, rhonchi or rales. Abdominal:      General: Abdomen is flat. There is no distension. Palpations: Abdomen is soft. Tenderness: There is no abdominal tenderness. There is no right CVA tenderness, left CVA tenderness, guarding or rebound. Musculoskeletal:      Cervical back: Neck supple. Right lower leg: No edema. Left lower leg: No edema. Skin:     General: Skin is warm. Findings: No erythema or rash. Neurological:      Mental Status: She is alert. Psychiatric:         Mood and Affect: Mood normal.           Assessment:       Diagnosis Orders   1. Hyperlipidemia, unspecified hyperlipidemia type  TSH with Reflex    Vitamin D 25 Hydroxy    CBC Auto Differential    Comprehensive Metabolic Panel    Lipid Panel   2.  Hypothyroidism, unspecified type  TSH with Reflex    Vitamin D 25 Hydroxy    CBC Auto Differential Comprehensive Metabolic Panel    Lipid Panel         Plan:     vc  nuclear stres test not done yet (08/2021)  Pursue psck 9 if she fails crestor (With plans to increase crestor if she tolerates)   Continue chronic medications  Get last pn from dr Kartik Olsen This Encounter   Procedures    TSH with Reflex     Standing Status:   Future     Standing Expiration Date:   9/16/2022    Vitamin D 25 Hydroxy     Standing Status:   Future     Standing Expiration Date:   9/16/2022    CBC Auto Differential     Standing Status:   Future     Standing Expiration Date:   9/16/2022    Comprehensive Metabolic Panel     Standing Status:   Future     Standing Expiration Date:   9/16/2022    Lipid Panel     Standing Status:   Future     Standing Expiration Date:   9/16/2022     Order Specific Question:   Is Patient Fasting?/# of Hours     Answer:   10     Orders Placed This Encounter   Medications    rosuvastatin (CRESTOR) 10 MG tablet     Sig: Take 1 tablet by mouth nightly     Dispense:  30 tablet     Refill:  0   FAILED statins  Discuss with staff about repatha PA. See orders   Risk of mi and stroke explained with hpl. Flu shot later this month   If anything should change or worsen call ASAP, don't wait for next scheduled appointment. Return in about 6 months (around 3/16/2022) for worsening symptoms, call ASAP for appointment, Chronic condition management/appointment.       Yasir Stevenson MD

## 2021-09-20 ENCOUNTER — TELEPHONE (OUTPATIENT)
Dept: FAMILY MEDICINE CLINIC | Age: 61
End: 2021-09-20

## 2021-09-20 NOTE — TELEPHONE ENCOUNTER
----- Message from Ayan Pearce sent at 9/20/2021 11:16 AM EDT -----  Subject: Message to Provider    QUESTIONS  Information for Provider? checking status of PA for Evolocumab. ref   prescription # [id number]  ---------------------------------------------------------------------------  --------------  [employer] INFO  What is the best way for the office to contact you? OK to leave message on   voicemail  Preferred Call Back Phone Number? [phone]  ---------------------------------------------------------------------------  --------------  SCRIPT ANSWERS  Relationship to Patient? Third Party  Representative Name?  Dana with [employer]

## 2021-09-22 ENCOUNTER — TELEPHONE (OUTPATIENT)
Dept: FAMILY MEDICINE CLINIC | Age: 61
End: 2021-09-22

## 2021-10-06 ENCOUNTER — OFFICE VISIT (OUTPATIENT)
Dept: BEHAVIORAL/MENTAL HEALTH CLINIC | Age: 61
End: 2021-10-06
Payer: COMMERCIAL

## 2021-10-06 DIAGNOSIS — F90.2 ATTENTION DEFICIT HYPERACTIVITY DISORDER (ADHD), COMBINED TYPE: Primary | ICD-10-CM

## 2021-10-06 DIAGNOSIS — F41.9 ANXIETY DISORDER, UNSPECIFIED TYPE: ICD-10-CM

## 2021-10-06 PROCEDURE — 90832 PSYTX W PT 30 MINUTES: CPT | Performed by: PSYCHOLOGIST

## 2021-10-06 ASSESSMENT — ANXIETY QUESTIONNAIRES
IF YOU CHECKED OFF ANY PROBLEMS ON THIS QUESTIONNAIRE, HOW DIFFICULT HAVE THESE PROBLEMS MADE IT FOR YOU TO DO YOUR WORK, TAKE CARE OF THINGS AT HOME, OR GET ALONG WITH OTHER PEOPLE: VERY DIFFICULT
2. NOT BEING ABLE TO STOP OR CONTROL WORRYING: 2
6. BECOMING EASILY ANNOYED OR IRRITABLE: 3
GAD7 TOTAL SCORE: 16
4. TROUBLE RELAXING: 3
7. FEELING AFRAID AS IF SOMETHING AWFUL MIGHT HAPPEN: 2
1. FEELING NERVOUS, ANXIOUS, OR ON EDGE: 2
3. WORRYING TOO MUCH ABOUT DIFFERENT THINGS: 2
5. BEING SO RESTLESS THAT IT IS HARD TO SIT STILL: 2

## 2021-10-06 ASSESSMENT — PATIENT HEALTH QUESTIONNAIRE - PHQ9
SUM OF ALL RESPONSES TO PHQ QUESTIONS 1-9: 11
SUM OF ALL RESPONSES TO PHQ QUESTIONS 1-9: 11
2. FEELING DOWN, DEPRESSED OR HOPELESS: 0
6. FEELING BAD ABOUT YOURSELF - OR THAT YOU ARE A FAILURE OR HAVE LET YOURSELF OR YOUR FAMILY DOWN: 0
1. LITTLE INTEREST OR PLEASURE IN DOING THINGS: 0
4. FEELING TIRED OR HAVING LITTLE ENERGY: 2
5. POOR APPETITE OR OVEREATING: 1
10. IF YOU CHECKED OFF ANY PROBLEMS, HOW DIFFICULT HAVE THESE PROBLEMS MADE IT FOR YOU TO DO YOUR WORK, TAKE CARE OF THINGS AT HOME, OR GET ALONG WITH OTHER PEOPLE: 1
9. THOUGHTS THAT YOU WOULD BE BETTER OFF DEAD, OR OF HURTING YOURSELF: 0
SUM OF ALL RESPONSES TO PHQ9 QUESTIONS 1 & 2: 0
8. MOVING OR SPEAKING SO SLOWLY THAT OTHER PEOPLE COULD HAVE NOTICED. OR THE OPPOSITE, BEING SO FIGETY OR RESTLESS THAT YOU HAVE BEEN MOVING AROUND A LOT MORE THAN USUAL: 2
3. TROUBLE FALLING OR STAYING ASLEEP: 3
SUM OF ALL RESPONSES TO PHQ QUESTIONS 1-9: 11
7. TROUBLE CONCENTRATING ON THINGS, SUCH AS READING THE NEWSPAPER OR WATCHING TELEVISION: 3

## 2021-10-06 ASSESSMENT — COLUMBIA-SUICIDE SEVERITY RATING SCALE - C-SSRS
2. HAVE YOU ACTUALLY HAD ANY THOUGHTS OF KILLING YOURSELF?: NO
6. HAVE YOU EVER DONE ANYTHING, STARTED TO DO ANYTHING, OR PREPARED TO DO ANYTHING TO END YOUR LIFE?: NO
1. WITHIN THE PAST MONTH, HAVE YOU WISHED YOU WERE DEAD OR WISHED YOU COULD GO TO SLEEP AND NOT WAKE UP?: NO

## 2021-10-06 NOTE — PATIENT INSTRUCTIONS
house for misplaced items. Once the items are collected take a walk around the house immediately to put them back where they belong. From Succeeding with Adult ADHD, Britney Keenan, [address], [place] (2012)      How to Improve ADHD Symptoms in Adults   The wandering mind - tips for maintaining focus   By [facility]     Anchoring your attention is an important skill. As your attention drifts, the likelihood that you will miss important information increases. Have you ever been reading, gotten to the end of the page, and wondered -- What on earth did I just read?  Rather than absorbing the words and understanding what was written, has your mind simply wandered midway through the page onto something else? Does this sometimes happen when others are talking to you? Then there is that awkward pause, where you are expected to reply but you have no idea what the other person just said. What if they had given you specific directions and you havent a clue what they were because your mind was off on something else. You are not alone. This is common for those with ADHD. The trick is finding strategies that can keep your mind from drifting. In her book, The New Attention Deficit Disorder In [address] [place] [address], Dr. Citlalli Vela calls this learning to [facility] your attention and she provides some simple strategies. Anchoring your attention is an important skill. As your attention drifts, the likelihood that you will miss important information increases. The next time you feel yourself losing focus, try these tips. The first step is awareness. Be aware that your mind tends to wander and try to catch yourself as it does.     Tips For Dealing With Intervening Thoughts While Reading  If you are reading a page in a book and you realize you havent comprehended the words because your mind is thinking about something else, Dr. Regina Padgett suggests placing your finger by the paragraph in the book approximately where you began to lose focus. Decide whether you will continue reading or spend time on your other thoughts. If reading is a priority, take a brief moment to jot down the thoughts that made you drift. You can jot down a few key words or draw a quick picture to remind you. By doing this, you can put these thoughts on hold to return to as soon as you finish reading. Your note will serve as a visual reminder of your intervening thought. Now you can refocus on your reading and return to your creative thoughts as soon as your reading is complete. What About When Your Mind Drifts During Conversations? Dont Be Afraid to Ask  There is absolutely nothing wrong with asking a person to repeat what he has said. If you catch yourself drifting during a conversation and realize you have no idea what was just said, simply ask for it to be repeated. Not only do you get to hear what is said, but by asking you are also letting that person know that what he says is important. Repeat Back  Ever heard of active listening? It is making a conscious effort to attend to what is being said and restating the message that is being conveyed by the speaker. As someone is talking to you, try to paraphrase back what is said periodically during the conversation. This keeps you active and involved and helps assure that you are getting and understanding the important points the speaker is trying to convey. Physical Strategies  Dr. Jann Shah explains that physical reinforcement is also helpful in anchoring your attention. Nodding your head slightly during the conversation, maintaining eye contact -- these tactics help you actively affirm to yourself that you are paying attention and help keep your focus on the speaker. Here is another strategy.  If you feel yourself becoming bored in a conversation or while listening to a lecture or in a meeting, grit your teeth, move your toes around in your shoes, or try other techniques that are quiet and unnoticeable but your planner in the gaps of time available. 5. Think subtract or swap when you add an item to your daily plan. Keep in mind the finite number of minutes in a day and the fact that you are only one person. If you have the means, consider delegating some tasks to others, like sending dirty clothes to the laundromat rather than doing the laundry yourself. 6. If a big project overwhelms you, consider breaking it down into multiple mini projects, with a deadline for each. Challenge: Having What You Need to Get Out the Door on Time  It's time to go, but your necessary items are scattered all around the house. Where are those car keys? Where are my glasses? Solutions:  1. Establish holding places near the door for keys, wallets, backpacks, and purses. Make it a habit of placing those items in the special place any time you walk in the door. 2. Put any items you need to take with you in the morning in the designated holding place or on the floor next to the door. Encourage all family members to do the same. Challenge: Having Too Much to Do in the Morning   You can't decide what to wear. Your shirt is wrinkled, so you have to iron it. You finally decide what to wear, but now one of your shoes is missing from the closet. Solutions:  1. Reduce morning stress by preparing the evening before. Gather all items for your morning outfit, including shoes and accessories, before you go to bed. 2. Establish and post a list of the morning routine. Do only those items. Do not squeeze in anything else. Challenge: A Lack of Internal Cues That Help You Devonda Bending the Passing of Time  How many times have you been engrossed in an activity on the computer and lost track of time? This happens to people with ADHD quite frequently. We get involved in an interesting activity, completely lose our sense of time and as a result, we miss an important meeting or picking the kids up from school on time. Solutions:  1.  Strategically set timers to ring or vibrate as a convenient external cue of elapsed time. You may even use a combination of a vibrating watch alarm set as a warning signal and a freestanding timer set 15 minutes later as a reminder to get off the computer in a timely manner. 2. Set a cell phone or watch alarm to vibrate every 10 or 15 minutes. When the alarm goes off, use that as a cue to orient yourself in time. Ask yourself if you are doing what's most important at this moment and if you are where you need to be. Challenge: Estimating How Long Specific Tasks Take  With a fluid ADHD-style time sense, it is difficult to know if there's enough time to finish a report the morning before the big meeting, to take one last phone call before leaving to drop off the children at soccer practice, or to make just one stop en route to the doctor's office in time for the appointment. Solutions:  1. Double or even triple the amount of time you think it will take to do something and then plan accordingly. 2. Make a rule for yourself that you will simply not do that [address] last thing before leaving the house for an appointment or en route to a destination. Set and stick to your deadlines and to-do list.  3. Zen your time sense by practicing. Start by estimating how long tasks will take. Write your estimates in your planner next to the item and keep track of the actual time spent. Look for patterns. Do you usually underestimate how long it takes to drive places? Do you tend to overestimate how long it will take you to complete your expense report? With a vigilant practice of guessing and recording the actual elapsed time, the gap between your estimated and actual time will narrow. Erma Bradley feel more in control and will arrive places consistently on time. 4. Determine how much time it truly takes you to get ready to leave the house in the morning, accounting for everything that must be done.     Challenge: Failure to Account for Time Eaters What are time eaters? Time eaters are the seemingly trivial, peripheral activities that accompany most actions we take, eating into our time without our awareness. They include traffic snarls, searching for parking spots, walking from parking lots into buildings, elevator delays, finding the right office, and the need to run back to the car for a forgotten item. Time eaters also show up at our workplace, interfering with on-task effectiveness. They include phone calls, audible e-mail alerts, and stoppers-by. Solutions:  1. Build in plenty of time to account for time eaters. Double or triple the amount of buffer time you normally allow for traveling to a destination. 2. To optimize timely task completion, choose a chunk of time when you'll turn off the phone ringer and your e-mail alerts, and hang a sign on your closed door requesting no interruptions. Challenge: A Desire to Avoid Being Early, Which Results in Being Late   Some people simply don't like to arrive places early. They may dread the discomfort or tedium of waiting for a meeting or appointment to start. Solutions:  1. Pack a \"guilty pleasures\" tote bag and keep it in your car to use just in case you arrive somewhere early. Guilty pleasure items are those you enjoy but often deny yourself due to a perceived lack of time. They might include magazines, novels, catalogs, or crossword and sudoku puzzles. A variation on this theme is a \"found time\" tote bag, which might include projects without a set deadline, such as thank-you cards (along with pens and envelopes). You may even find yourself aiming to arrive early so you can reward yourself with your indulgent activity. 2. Use the wait as necessary downtime in your day. Try a simple meditation technique of focusing on your breath, or simply remind yourself that a few minutes of daily downtime is required for recharging your brain, making it a productive use of your time.   3. Take advantage of this time by accomplishing those things you're unlikely to schedule such as cleaning out your wallet or purse, balancing your checkbook, or tweaking your to-do list.

## 2021-10-06 NOTE — Clinical Note
Pt meets diagnostic criteria for ADHD, combined type. She will likely be reaching out to you shortly to discuss treatment (Rx) options.

## 2021-10-06 NOTE — PROGRESS NOTES
Behavioral Health Consultation  Dayday Hobson PsyD, [address] [facility]  Psychologist  10/6/21  9:06 AM EDT      Time spent with Patient: 30 minutes  This is patient's second  [facility] appointment. Reason for Consult:  ADHD Assessment  Referring Provider: Luna Ornelas MD    Feedback given to PCP. S:  Patient returns to complete assessment for ADHD. 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 Patient described her mood as \"kind of crabby lately\" and described her sleep as \"once I am up, I am up. \" Patient denied any disturbance in appetite. The patient acknowledged continued difficulty with attention and concentration, organization, restlessness, and time management. O:  MSE:    Appearance:  alert, cooperative  Behavior:  Cooperative and Pleasant  Appetite:  normal  Sleep disturbance:  Yes  Fatigue:  Yes  Loss of pleasure:  No  Impulsive behavior:   At times  Speech:  spontaneous, normal rate, normal volume and well articulated  Mood:  Irritability  Affect:  Neutral/Euthymic(normal)  Thought Process:  Goal-Directed  Thought Content:  intact  Associations:  logical connections  Insight:  good   Judgement:  good  Orientation:  oriented to person, place, time, and general circumstances  Memory:  recent and remote memory intact  Attention/Concentration:  impaired  Morbid ideation:  No  Suicide Assessment:  no suicidal ideation      History:    Medications:   Current Outpatient Medications   Medication Sig Dispense Refill    rosuvastatin (CRESTOR) 10 MG tablet Take 1 tablet by mouth nightly 30 tablet 0    Cholecalciferol (VITAMIN D3) 125 MCG (5000 UT) TABS Take by mouth Take one tablet po once daily four days out of the week      vitamin B-12 (CYANOCOBALAMIN) 100 MCG tablet Take 50 mcg by mouth daily      polyethylene glycol (MIRALAX) 17 GM/SCOOP powder Take 17 g by mouth daily 1 Bottle 1    levothyroxine (SYNTHROID) 100 MCG tablet Take 1 tablet by mouth daily 90 tablet 3    escitalopram (LEXAPRO) 20 MG tablet Take 1 tablet by mouth daily 90 tablet 1    omeprazole (PRILOSEC) 40 MG capsule Take 1 capsule by mouth daily. 30 capsule 5     No current facility-administered medications for this visit. Social History:   Social History     Socioeconomic History    Marital status:      Spouse name: Not on file    Number of children: Not on file    Years of education: Not on file    Highest education level: Not on file   Occupational History    Occupation:      Employer: [employer]   Tobacco Use    Smoking status: Former Smoker     Packs/day: 0.25     Years: 35.00     Pack years: 8.75     Start date: 2013     Quit date: 2019     Years since quittin.5    Smokeless tobacco: Never Used   Vaping Use    Vaping Use: Former    Substances: Always   Substance and Sexual Activity    Alcohol use: No     Alcohol/week: 0.0 standard drinks    Drug use: Not Currently    Sexual activity: Yes     Partners: Male   Other Topics Concern    Not on file   Social History Narrative    Not on file     Social Determinants of Health     Financial Resource Strain: Low Risk     Difficulty of Paying Living Expenses: Not hard at all   Food Insecurity: No Food Insecurity    Worried About [address] in the Last Year: Never true    [address] in the Last Year: Never true   Transportation Needs:     Lack of Transportation (Medical):      Lack of Transportation (Non-Medical):    Physical Activity:     Days of Exercise per Week:     Minutes of Exercise per Session:    Stress:     Feeling of Stress :    Social Connections:     Frequency of Communication with Friends and Family:     Frequency of Social Gatherings with Friends and Family:     Attends Synagogue Services:     Active Member of Clubs or Organizations:     Attends Club or Organization Meetings:     Marital Status:    Intimate Partner Violence:     Fear of Current or Ex-Partner:     Emotionally Abused:     Physically Abused:     Sexually Abused:        TOBACCO:   reports that she quit smoking about 2 years ago. She started smoking about 8 years ago. She has a 8.75 pack-year smoking history. She has never used smokeless tobacco.  ETOH:   reports no history of alcohol use. Family History:   Family History   Problem Relation Age of Onset    Heart Disease Mother         COPD, she has cervical cancer as well.  Stroke Mother         No other fdr with cancer, no aneurysm or heart problems.  Diabetes Mother     Cancer Father         STOMACH, committed suicide    Other Sister         low thyroid     Other Brother         ?stroke, not verified. A:  Administered the PHQ9, which indicates a self report of moderate depression; however, patient's score on the measure was heavily influenced by report of difficulty with sleep, concentration, and restlessness, which are also prevalent with ADHD. Patient was also administered the WOODROW-7, which indicates a self report of severe anxiety. Patient would likely benefit from [facility] services to increase coping skills to provide symptom management/control/relief. PHQ Scores 10/6/2021 9/8/2021 8/19/2021   PHQ2 Score 0 1 0   PHQ9 Score 11 11 0     Interpretation of Total Score Depression Severity: 1-4 = Minimal depression, 5-9 = Mild depression, 10-14 = Moderate depression, 15-19 = Moderately severe depression, 20-27 = Severe depression      WOODROW 7 SCORE 10/6/2021 9/8/2021   WOODROW-7 Total Score 16 11     Interpretation of WOODROW-7 score: 5-9 = mild anxiety, 10-14 = moderate anxiety, 15+ = severe anxiety. Recommend referral to behavioral health for scores 10 or greater. DSM 5 Diagnostic Interview  ADHD    A.   Inattention inclusion: Requires a pattern of behavior, with onset before age 15 years, that is present in multiple settings and gives rise to social, educational, or work performance difficulties. The symptoms must be persistently present for at least 6 months to a degree inconsistent with developmental level. The disorder is manifested by at least six (five for adolescents and adults age 16+) of the following symptoms of inattention. 1. Overlooks details: Over at least the last 6 months, have other people told you that you often overlook or miss details, or that you made careless mistakes in your work? Yes      Examples:  \"I get emails from employees where I forgot to do part of something or missed something. \"  2. Task inattention: Do you often have difficulty staying focused on a task or activity, such as reading a lengthy writing or listening to a lecture or conversation? Yes   Examples: \"If I'm reading something, I end up rereading the same page over and over; in conversations, I often have to ask them to repeat themself because I missed it. \"  3. Appears not to listen: Do other people tell you that when they speak to you, your mind often seems to be elsewhere or that it seems like you are not listening? Yes   Examples: \"My mom daughter asks me all the time, 'Aren't you even listening?' it's like my mind just wanders. \"  4. Fails to finish tasks: Do you often struggle to finish schoolwork, chores, or work assignments because you lose focus or are easily sidetracked? Yes   Examples: \"My  will tell you I get nothing finished; I start but jump from one thing to another and end up getting nothing done. \"  5. Difficulty organizing tasks: Do you find it difficult to organize tasks or activities? Do you struggle with time management or fail to meet deadlines? Yes   Examples: \"My time management at work, I struggle to [employer] deadlines. \"  6. Avoids tasks requiring sustained mental activity: Do you often avoid tasks that require sustained mental effort?    Yes   Examples:  \"I will try to put it off or do anything but that.\"  7. Often loses things necessary for tasks: Do you often lose things that are essential for tasks or activities, such as school materials, books, tools, wallets, keys, paperwork, eyeglasses, or your phone? No   Examples:  \"Not anymore; I have one spot that I always put things so I don't lose them. \"  8. Easily distracted: Do you find that you are often easily distracted by things or thoughts unrelated to the activity or task your are supposed to be doing? Yes   Examples:  (see above)  9. Often forgetful: Do you find, or do other peopled find, that you are often forgetful in your daily activities? Yes   Examples:  \"I'll start working on one thing and then get distracted and go to something else, completely forgetting to go back and finish what I started with in the first place. \"    B. Hyperactivity/impulsivity inclusion: Alternatively, requires the presence of at least six of the following manifestations of hyperactivity and impulsivity over the same course. 1. Fidgets: Over the last 6 months, have you often found yourself fidgeting with your hands or feet? Do you find it hard to sit without squirming? Yes   Examples: \"My hand and feet are always moving; I even do that in bed too when trying to sleep, my  yells at me for that one. \"  2. Leaves seat: When you are in a situation where you are expected to sit, do you often leave your seat? No   3. Runs or climbs: Do you often find yourself running around or climbing in a situation where doing so is inappropriate? (Note: In adolescents or adults, may be limited to feeling restless.)   Yes   Examples:  \"I get very restless, but still end up getting nothing done. \"  4. Unable to maintain quiet: Do you often find yourself unable to play or engage in leisure activities quietly? Yes   Examples:  \"I last about 10 minutes; even trying to lay out at the pool, in 10 minutes I'm up doing something. \"  5.  Hyperactivity: Do you often feel as if you are, or do other people describe you as always being, on the go or acting as if you were driven by a motor ? Do you find it uncomfortable to sit still for an extended time? Yes   Examples:  \"I can't sit still, I'm always up and down. \"  6. Talks excessively: Do you often talk excessively? Only if I'm really nervous     7. Blurts answers: Do often struggle to wait your turn in a conversation? Do you often complete other peoples sentences or blurt out an answer before a question has been completed? Yes   Examples:  \"I do that. \"  8. Struggles to take turns: Do you often have difficulty waiting your turn or waiting in line? Yes   Examples: \"At the grocery store for example, I end up jumping from line to line - I don't like the waiting. \"  9. Interrupts or intrudes: Do you often butt into other peoples activities, conversations, or games? Yes, with people I know; family, friends, coworkers. \"       C. Exclusion: If the criteria are not met in two or more settings, or there is no evidence that the symptoms interfere with functioning, the symptoms occur only in the context of a psychotic disorder, or the symptoms are better explained by another mental disorder, do not make the diagnosis. D.  Modifiers  1. Specifiers:  a. Combined presentation: If both inattention and hyperactivity-impulsivity criteria are met for the past 6 months.  b. Predominantly inattentive presentation: If inattention criteria are met but hyperactivity-impulsivity criteria have not been met for the past 6 months.  c. Predominantly hyperactive/impulsive presentation: If hyperactivity-impulsivity criteria are met and inattention criteria have not been met for the past 6 months. 2. Specifiers:  a. In partial remission  3.  Severity:  a. Mild: Few, if any, symptoms in excess of those required to make the diagnosis are present, and symptoms result in no more than minor impairments in social or occupational functioning.  b. Moderate: Symptoms or functional impairment between mild and [place] is present. c. Severe: Many symptoms in excess of those required to make the diagnosis, or several symptoms that are particularly severe, are present, or the symptoms result in marked impairment in social or occupational functioning. E.  Alternatives: if a person is experiencing subthreshold symptoms or you have not yet had sufficient opportunity to verify all criteria, consider other specified or unspecified attention-deficit/hyperactivity disorder. The symptoms must be associated with impairment and do not occur exclusively during the course of schizophrenia or another psychotic disorder and are not better explained by another mental disorder. The patient was provided with two copies of the Adult ADHD Self-Report Scale (ASRS), a self-report measure of the prevalence and impact of common symptoms of ADHD; one copy was completed by the patient, and the second by her  in reference to the patient. The ASRS scores (6;4) are suggestive of the presence of ADHD. Patient was also administered the [facility] which is used to retroactively assess ADHD symptoms. A cutoff score of 46 on 25 questions associated with ADHD is recommended to determine if symptoms are consistent with ADHD. The patient's score was 61, indicating the presence of symptoms of ADHD from childhood. The patient was administered the DSM-V diagnostic interview as part of the ADHD evaluation process. Patient endorsed 8 of the 9 Inattentive symptom criteria. Patient also endorsed 7 of the 9 Hyperactive and Impulsive symptom criteria. Based on these responses, along with self-reported symptoms on the ASRS and [place], the patient meets diagnostic criteria of ADHD, combined type. Diagnosis:    1. Attention deficit hyperactivity disorder (ADHD), combined type    2.  Anxiety disorder, unspecified type             Diagnosis Date    Colon polyposis 4/2013    multiple on colonoscopy, do [id number]    Depression     Esophageal web 4/2013    Fibromyalgia 11/2/2011    GERD (gastroesophageal reflux disease)     Hyperlipidemia     Hypothyroidism     Macular degeneration     left eye    Normal nuclear stress test 3/21/13           Plan:  Return if symptoms worsen or fail to improve. Pt interventions:  Provided education on the use of medication to treat  ADHD, Discussed various factors related to the development and maintenance of  ADHD (including biological, cognitive, behavioral, and environmental factors), Conducted functional assessment, [place]-setting to identify patient's primary goals for [facility] visit/overall health, Supportive techniques and Provided Psychoeducation re: ADHD assessment process, administered, scored and interpreted self-report measures of ADHD and the DSM 5 Diagnostic Clinical Interview for ADHD. Pt Behavioral Change Plan:  Follow up with your PCP (Dr. Armida Ibanez) about possible treatment options    Review the included tips for managing symptoms of ADHD included below    Call to schedule with [facility] for additional skills training, if needed      Please note this report has been partially produced using speech recognition software and may cause contain errors related to that system including grammar, punctuation, and spelling, as well as words and phrases that may seem inappropriate. If there are questions or concerns please feel free to contact me to clarify.

## 2021-10-07 DIAGNOSIS — G56.01 CARPAL TUNNEL SYNDROME OF RIGHT WRIST: ICD-10-CM

## 2021-10-07 DIAGNOSIS — M47.817 LUMBOSACRAL SPONDYLOSIS WITHOUT MYELOPATHY: ICD-10-CM

## 2021-10-07 DIAGNOSIS — F11.90 CHRONIC, CONTINUOUS USE OF OPIOIDS: ICD-10-CM

## 2021-10-07 DIAGNOSIS — Z79.891 ENCOUNTER FOR LONG-TERM OPIATE ANALGESIC USE: ICD-10-CM

## 2021-10-07 DIAGNOSIS — M47.812 CERVICAL SPONDYLOSIS WITHOUT MYELOPATHY: ICD-10-CM

## 2021-10-07 NOTE — TELEPHONE ENCOUNTER
Requested Prescriptions     Pending Prescriptions Disp Refills    tiZANidine (ZANAFLEX) 4 MG tablet 30 tablet 0     Sig: Take 1 tablet by mouth nightly as needed (pain)    traMADol (ULTRAM) 50 MG tablet 10 tablet 0     Sig: Take 1 tablet by mouth daily as needed for Pain for up to 10 days.        Patient last seen on:  9/8/21  Date of last surgery:  n/a  Date of last refill:  8/18/21  Pain level:  n/a  Patient complaining of:  Pt requesting refill  Future appts: 10/20/21

## 2021-10-08 RX ORDER — TIZANIDINE 4 MG/1
4 TABLET ORAL NIGHTLY PRN
Qty: 30 TABLET | Refills: 0 | Status: SHIPPED | OUTPATIENT
Start: 2021-10-08 | End: 2021-11-05

## 2021-10-08 RX ORDER — TRAMADOL HYDROCHLORIDE 50 MG/1
50 TABLET ORAL DAILY PRN
Qty: 10 TABLET | Refills: 0 | Status: SHIPPED | OUTPATIENT
Start: 2021-10-08 | End: 2022-02-07 | Stop reason: SDUPTHER

## 2021-10-15 ENCOUNTER — TELEPHONE (OUTPATIENT)
Dept: PAIN MANAGEMENT | Age: 61
End: 2021-10-15

## 2021-10-15 NOTE — TELEPHONE ENCOUNTER
BENEFITS: KEENAN C5-6, C6-7, C7-T1 FACET    Insurance: [address]  Phone: [phone]  Contact Name: Lisa Coley  Effective Date: 1.1.2020     Plan year: YES-CALENDAR  Deductible: 3000.00      Deductible Met: 1204.79  Allowed/benefits paid at:90% AFTER DEDUCTIBLE  OOP: 6000.00 MET $1205.99  Freq Limits: 40583, [id number] & 17610  Prior Auth Requirement: BASED ON MEDICAL NECESSITY    Notes: NO PRE-EX CLAUSE    Call Reference #: [id number]    Time of call: 8:30AM

## 2021-10-22 RX ORDER — ROSUVASTATIN CALCIUM 20 MG/1
20 TABLET, COATED ORAL NIGHTLY
Qty: 90 TABLET | Refills: 3 | Status: SHIPPED | OUTPATIENT
Start: 2021-10-22 | End: 2021-11-23 | Stop reason: DRUGHIGH

## 2021-10-22 RX ORDER — ROSUVASTATIN CALCIUM 10 MG/1
TABLET, COATED ORAL
Qty: 30 TABLET | Refills: 0 | OUTPATIENT
Start: 2021-10-22

## 2021-10-22 NOTE — TELEPHONE ENCOUNTER
DOSE INCREASE  If any joint pains let me know  Thanks,    Call immediately should something change.      Marylou Clemons

## 2021-10-23 DIAGNOSIS — E03.9 HYPOTHYROIDISM: ICD-10-CM

## 2021-10-25 RX ORDER — LEVOTHYROXINE SODIUM 0.1 MG/1
100 TABLET ORAL DAILY
Qty: 90 TABLET | Refills: 3 | Status: SHIPPED | OUTPATIENT
Start: 2021-10-25 | End: 2022-05-13 | Stop reason: DRUGHIGH

## 2021-10-25 NOTE — TELEPHONE ENCOUNTER
Future Appointments    Department: [facility]   Appt Notes: np annual   3/16/2022 Johnnie Garrett MD   Department: [facility]   Recent Visits    09/16/2021 Hyperlipidemia, unspecified hyperlipidemia type   [facility] Raji Still MD

## 2021-10-26 ENCOUNTER — PATIENT MESSAGE (OUTPATIENT)
Dept: FAMILY MEDICINE CLINIC | Age: 61
End: 2021-10-26

## 2021-10-27 NOTE — TELEPHONE ENCOUNTER
Can we please put her on for virtual soon  To discuss her options and the risks with these medications.

## 2021-11-03 DIAGNOSIS — M47.817 LUMBOSACRAL SPONDYLOSIS WITHOUT MYELOPATHY: ICD-10-CM

## 2021-11-03 DIAGNOSIS — M47.812 CERVICAL SPONDYLOSIS WITHOUT MYELOPATHY: ICD-10-CM

## 2021-11-03 NOTE — TELEPHONE ENCOUNTER
Requested Prescriptions     Pending Prescriptions Disp Refills    tiZANidine (ZANAFLEX) 4 MG tablet [Pharmacy Med Name: TIZANIDINE 4MG TABLETS] 30 tablet 0     Sig: TAKE 1 TABLET BY MOUTH EVERY NIGHT AS NEEDED FOR PAIN

## 2021-11-05 RX ORDER — TIZANIDINE 4 MG/1
TABLET ORAL
Qty: 30 TABLET | Refills: 0 | Status: SHIPPED | OUTPATIENT
Start: 2021-11-05 | End: 2022-02-08

## 2021-11-19 ENCOUNTER — TELEPHONE (OUTPATIENT)
Dept: FAMILY MEDICINE CLINIC | Age: 61
End: 2021-11-19

## 2021-11-23 ENCOUNTER — VIRTUAL VISIT (OUTPATIENT)
Dept: FAMILY MEDICINE CLINIC | Age: 61
End: 2021-11-23
Payer: COMMERCIAL

## 2021-11-23 DIAGNOSIS — F90.2 ATTENTION DEFICIT HYPERACTIVITY DISORDER (ADHD), COMBINED TYPE: Primary | ICD-10-CM

## 2021-11-23 DIAGNOSIS — E78.5 HYPERLIPIDEMIA, UNSPECIFIED HYPERLIPIDEMIA TYPE: ICD-10-CM

## 2021-11-23 DIAGNOSIS — K21.9 GASTROESOPHAGEAL REFLUX DISEASE WITHOUT ESOPHAGITIS: ICD-10-CM

## 2021-11-23 PROCEDURE — 99214 OFFICE O/P EST MOD 30 MIN: CPT | Performed by: INTERNAL MEDICINE

## 2021-11-23 RX ORDER — ROSUVASTATIN CALCIUM 40 MG/1
40 TABLET, COATED ORAL DAILY
Qty: 90 TABLET | Refills: 3 | Status: SHIPPED | OUTPATIENT
Start: 2021-11-23

## 2021-11-23 RX ORDER — OMEPRAZOLE 40 MG/1
40 CAPSULE, DELAYED RELEASE ORAL DAILY
Qty: 90 CAPSULE | Refills: 3 | Status: SHIPPED | OUTPATIENT
Start: 2021-11-23

## 2021-11-23 ASSESSMENT — ENCOUNTER SYMPTOMS
SHORTNESS OF BREATH: 0
BACK PAIN: 0
EYE PAIN: 0
ABDOMINAL PAIN: 0

## 2021-11-23 ASSESSMENT — PATIENT HEALTH QUESTIONNAIRE - PHQ9
SUM OF ALL RESPONSES TO PHQ QUESTIONS 1-9: 0
SUM OF ALL RESPONSES TO PHQ QUESTIONS 1-9: 0
SUM OF ALL RESPONSES TO PHQ9 QUESTIONS 1 & 2: 0
2. FEELING DOWN, DEPRESSED OR HOPELESS: 0
1. LITTLE INTEREST OR PLEASURE IN DOING THINGS: 0
SUM OF ALL RESPONSES TO PHQ QUESTIONS 1-9: 0

## 2021-11-23 NOTE — PROGRESS NOTES
Subjective:      Patient ID: Kamryn Kunz is a 62 y.o. female who presents today with:  No chief complaint on file.       HPI     Here for fu   Doing well with crestor   No se   Past Medical History:   Diagnosis Date    Colon polyposis 2013    multiple on colonoscopy, do [id number]    Depression     Esophageal web 2013    Fibromyalgia 2011    GERD (gastroesophageal reflux disease)     Hyperlipidemia     Hypothyroidism     Macular degeneration     left eye    Normal nuclear stress test 3/21/13     Past Surgical History:   Procedure Laterality Date     SECTION      CHOLECYSTECTOMY  13    Lapchole with gram    COLONOSCOPY  2013    HERNIA REPAIR      HYSTERECTOMY      still with one ovary    UPPER GASTROINTESTINAL ENDOSCOPY  2013     Social History     Socioeconomic History    Marital status:      Spouse name: Not on file    Number of children: Not on file    Years of education: Not on file    Highest education level: Not on file   Occupational History    Occupation:      Employer: [employer]   Tobacco Use    Smoking status: Former Smoker     Packs/day: 0.25     Years: 35.00     Pack years: 8.75     Start date: 2013     Quit date: 2019     Years since quittin.6    Smokeless tobacco: Never Used   Vaping Use    Vaping Use: Former    Substances: Always   Substance and Sexual Activity    Alcohol use: No     Alcohol/week: 0.0 standard drinks    Drug use: Not Currently    Sexual activity: Yes     Partners: Male   Other Topics Concern    Not on file   Social History Narrative    Not on file     Social Determinants of Health     Financial Resource Strain: Low Risk     Difficulty of Paying Living Expenses: Not hard at all   Food Insecurity: No Food Insecurity    Worried About Running Out of Food in the Last Year: Never true    Rudy of Food in the Last Year: Never true   Transportation Needs:     Lack of Transportation (Medical): Not on file    Lack of Transportation (Non-Medical): Not on file   Physical Activity:     Days of Exercise per Week: Not on file    Minutes of Exercise per Session: Not on file   Stress:     Feeling of Stress : Not on file   Social Connections:     Frequency of Communication with Friends and Family: Not on file    Frequency of Social Gatherings with Friends and Family: Not on file    Attends Catholic Services: Not on file    Active Member of [employer] or Organizations: Not on file    Attends Club or Organization Meetings: Not on file    Marital Status: Not on file   Intimate Partner Violence:     Fear of Current or Ex-Partner: Not on file    Emotionally Abused: Not on file    Physically Abused: Not on file    Sexually Abused: Not on file   Housing Stability:     Unable to Pay for Housing in the Last Year: Not on file    Number of [place] in the Last Year: Not on file    Unstable Housing in the Last Year: Not on file     No Known Allergies  Current Outpatient Medications on File Prior to Visit   Medication Sig Dispense Refill    tiZANidine (ZANAFLEX) 4 MG tablet TAKE 1 TABLET BY MOUTH EVERY NIGHT AS NEEDED FOR PAIN 30 tablet 0    levothyroxine (SYNTHROID) 100 MCG tablet TAKE 1 TABLET BY MOUTH DAILY 90 tablet 3    Cholecalciferol (VITAMIN D3) 125 MCG (5000 UT) TABS Take by mouth Take one tablet po once daily four days out of the week      vitamin B-12 (CYANOCOBALAMIN) 100 MCG tablet Take 50 mcg by mouth daily      polyethylene glycol (MIRALAX) 17 GM/SCOOP powder Take 17 g by mouth daily 1 Bottle 1    escitalopram (LEXAPRO) 20 MG tablet Take 1 tablet by mouth daily 90 tablet 1     No current facility-administered medications on file prior to visit. I have personally reviewed the ROS, PMH, PFH, and social history     Review of Systems   Constitutional: Negative for chills and fever. HENT: Negative for congestion. Eyes: Negative for pain.    Respiratory: Negative for shortness of breath. Cardiovascular: Negative for chest pain. Gastrointestinal: Negative for abdominal pain. Genitourinary: Negative for hematuria. Musculoskeletal: Negative for back pain. Allergic/Immunologic: Negative for immunocompromised state. Neurological: Negative for headaches. Psychiatric/Behavioral: Negative for hallucinations. Objective: There were no vitals taken for this visit. Physical Exam  Constitutional:       General: She is not in acute distress. Appearance: She is not ill-appearing, toxic-appearing or diaphoretic. Pulmonary:      Effort: Pulmonary effort is normal. No respiratory distress. Assessment:       Diagnosis Orders   1. Attention deficit hyperactivity disorder (ADHD), combined type  Lisdexamfetamine Dimesylate (VYVANSE) 20 MG CAPS   2. Hyperlipidemia, unspecified hyperlipidemia type     3. Gastroesophageal reflux disease without esophagitis  omeprazole (PRILOSEC) 40 MG delayed release capsule         Plan:   VIDEO visit done because of coronavirus pandemic.   doxyme used   explained billeable visit, patient understands and agrees to continue  I was at home and patient was at home during this visit. She will go for covid booster 2021   nuclear stres test not done yet (08/2021)  Start vyvanse   Continue chronic medications  Increase crestor doing well   Please see ob/gyn   Mammogram 01/2022                 No orders of the defined types were placed in this encounter. Orders Placed This Encounter   Medications    Lisdexamfetamine Dimesylate (VYVANSE) 20 MG CAPS     Sig: Take 1 capsule by mouth daily for 30 days.  30 day supply     Dispense:  30 capsule     Refill:  0    rosuvastatin (CRESTOR) 40 MG tablet     Sig: Take 1 tablet by mouth daily     Dispense:  90 tablet     Refill:  3    omeprazole (PRILOSEC) 40 MG delayed release capsule     Sig: Take 1 capsule by mouth daily     Dispense:  90 capsule     Refill:  3   Get last pn from dr Dyana Hollins  (reviewed no longer getting swelling  Not like it was  This is better  She will call should anything change   Doing well with lexapro  No si.hi   Risk of addiction, overdose, withdrawal, weight loss, heart problems, etc explained,. If anything should change or worsen call ASAP, don't wait for next scheduled appointment. Return in about 3 months (around 2/23/2022) for Chronic condition management/appointment, worsening symptoms, call ASAP for appointment.       Judge Ruth MD

## 2021-12-01 ENCOUNTER — TELEPHONE (OUTPATIENT)
Dept: FAMILY MEDICINE CLINIC | Age: 61
End: 2021-12-01

## 2021-12-01 DIAGNOSIS — F90.2 ADHD (ATTENTION DEFICIT HYPERACTIVITY DISORDER), COMBINED TYPE: Primary | ICD-10-CM

## 2021-12-01 RX ORDER — METHYLPHENIDATE HYDROCHLORIDE 18 MG/1
18 TABLET ORAL DAILY
Qty: 30 TABLET | Refills: 0 | Status: SHIPPED | OUTPATIENT
Start: 2021-12-01 | End: 2022-01-11 | Stop reason: SDUPTHER

## 2021-12-01 NOTE — TELEPHONE ENCOUNTER
Spoke to patient, she is agreeable to starting Concerta.  [facility]

## 2022-01-10 ENCOUNTER — TELEPHONE (OUTPATIENT)
Dept: FAMILY MEDICINE CLINIC | Age: 62
End: 2022-01-10

## 2022-01-10 DIAGNOSIS — F90.2 ADHD (ATTENTION DEFICIT HYPERACTIVITY DISORDER), COMBINED TYPE: ICD-10-CM

## 2022-01-10 RX ORDER — ESCITALOPRAM OXALATE 20 MG/1
20 TABLET ORAL DAILY
Qty: 90 TABLET | Refills: 3 | Status: SHIPPED | OUTPATIENT
Start: 2022-01-10

## 2022-01-10 RX ORDER — METHYLPHENIDATE HYDROCHLORIDE 18 MG/1
18 TABLET ORAL DAILY
Qty: 30 TABLET | Refills: 0 | OUTPATIENT
Start: 2022-01-10 | End: 2022-02-09

## 2022-01-10 NOTE — TELEPHONE ENCOUNTER
Patient requesting medication refill. Requested Prescriptions     Pending Prescriptions Disp Refills    methylphenidate (CONCERTA) 18 MG extended release tablet 30 tablet 0     Sig: Take 1 tablet by mouth daily for 30 days. 30 day supply    escitalopram (LEXAPRO) 20 MG tablet 90 tablet 1     Sig: Take 1 tablet by mouth daily     Patient requesting medication refill.  Please approve or deny this request.    LOV  11/23/2021  Scheduled 3/16/2022

## 2022-01-11 DIAGNOSIS — F90.2 ADHD (ATTENTION DEFICIT HYPERACTIVITY DISORDER), COMBINED TYPE: ICD-10-CM

## 2022-01-11 RX ORDER — METHYLPHENIDATE HYDROCHLORIDE 18 MG/1
18 TABLET ORAL DAILY
Qty: 30 TABLET | Refills: 0 | Status: SHIPPED | OUTPATIENT
Start: 2022-01-11 | End: 2022-02-08 | Stop reason: SDUPTHER

## 2022-01-11 NOTE — TELEPHONE ENCOUNTER
requesting medication refill.      Rx requested:  Requested Prescriptions      No prescriptions requested or ordered in this encounter       Last Office Visit:   11/23/2021    Last Tox screen:        Last Medication contract:        Next Visit Date:  Future Appointments   Date Time Provider Stew Casanova   3/16/2022  8:30 AM Marco Smart MD [address] [place] [address] 7

## 2022-01-12 NOTE — TELEPHONE ENCOUNTER
[facility] Clinical Staff  Subject: Refill Request     QUESTIONS   Name of Medication? methylphenidate (CONCERTA) 18 MG extended release   tablet   Patient-reported dosage and instructions? 1 time daily   How many days do you have left? 0   Preferred Pharmacy? [id number]   Pharmacy phone number (if available)? [phone]   Additional Information for Provider? Pt's Concerta prescription was not   refilled when she called in 1/10/2022 for refills for 2 medications   ---------------------------------------------------------------------------   --------------   CALL BACK INFO   What is the best way for the office to contact you? OK to leave message on   voicemail   Preferred Call Back Phone Number?  [phone]

## 2022-01-31 ENCOUNTER — HOSPITAL ENCOUNTER (OUTPATIENT)
Dept: WOMENS IMAGING | Age: 62
Discharge: HOME | End: 2022-02-02
Payer: COMMERCIAL

## 2022-01-31 DIAGNOSIS — Z12.31 ENCOUNTER FOR SCREENING MAMMOGRAM FOR MALIGNANT NEOPLASM OF BREAST: ICD-10-CM

## 2022-01-31 PROCEDURE — 77063 BREAST TOMOSYNTHESIS BI: CPT

## 2022-02-07 DIAGNOSIS — M47.812 CERVICAL SPONDYLOSIS WITHOUT MYELOPATHY: ICD-10-CM

## 2022-02-07 DIAGNOSIS — F11.90 CHRONIC, CONTINUOUS USE OF OPIOIDS: ICD-10-CM

## 2022-02-07 DIAGNOSIS — M47.817 LUMBOSACRAL SPONDYLOSIS WITHOUT MYELOPATHY: ICD-10-CM

## 2022-02-07 DIAGNOSIS — Z79.891 ENCOUNTER FOR LONG-TERM OPIATE ANALGESIC USE: ICD-10-CM

## 2022-02-07 DIAGNOSIS — G56.01 CARPAL TUNNEL SYNDROME OF RIGHT WRIST: ICD-10-CM

## 2022-02-07 RX ORDER — TRAMADOL HYDROCHLORIDE 50 MG/1
50 TABLET ORAL DAILY PRN
Qty: 30 TABLET | Refills: 0 | OUTPATIENT
Start: 2022-02-07 | End: 2022-03-09

## 2022-02-07 RX ORDER — TIZANIDINE 4 MG/1
4 TABLET ORAL NIGHTLY PRN
Qty: 30 TABLET | Refills: 0 | OUTPATIENT
Start: 2022-02-07 | End: 2022-03-09

## 2022-02-07 NOTE — TELEPHONE ENCOUNTER
Requested Prescriptions     Pending Prescriptions Disp Refills    traMADol (ULTRAM) 50 MG tablet 30 tablet 0     Sig: Take 1 tablet by mouth daily as needed for Pain for up to 30 days.  tiZANidine (ZANAFLEX) 4 MG tablet 30 tablet 0     Sig: Take 1 tablet by mouth nightly as needed (pain)       Patient last seen on:  9/8/21  Date of last surgery:  n/a  Date of last refill:  11/05/21  Pain level:  n/a  Patient complaining of:  Pt asking for refill. Future appts: waiting to schedule injection.

## 2022-02-08 DIAGNOSIS — F90.2 ADHD (ATTENTION DEFICIT HYPERACTIVITY DISORDER), COMBINED TYPE: ICD-10-CM

## 2022-02-08 RX ORDER — METHYLPHENIDATE HYDROCHLORIDE 18 MG/1
18 TABLET ORAL DAILY
Qty: 30 TABLET | Refills: 0 | Status: SHIPPED | OUTPATIENT
Start: 2022-02-10 | End: 2022-03-12 | Stop reason: SDUPTHER

## 2022-02-08 RX ORDER — TIZANIDINE 4 MG/1
4 TABLET ORAL NIGHTLY PRN
Qty: 3 TABLET | Refills: 0 | Status: SHIPPED | OUTPATIENT
Start: 2022-02-08 | End: 2022-02-11

## 2022-02-08 RX ORDER — TRAMADOL HYDROCHLORIDE 50 MG/1
50 TABLET ORAL DAILY PRN
Qty: 3 TABLET | Refills: 0 | Status: SHIPPED | OUTPATIENT
Start: 2022-02-08 | End: 2022-02-11

## 2022-02-10 ENCOUNTER — PROCEDURE VISIT (OUTPATIENT)
Dept: PAIN MANAGEMENT | Age: 62
End: 2022-02-10
Payer: COMMERCIAL

## 2022-02-10 DIAGNOSIS — M47.812 CERVICAL SPONDYLOSIS WITHOUT MYELOPATHY: Primary | ICD-10-CM

## 2022-02-10 DIAGNOSIS — M47.817 LUMBOSACRAL SPONDYLOSIS WITHOUT MYELOPATHY: ICD-10-CM

## 2022-02-10 PROCEDURE — 64491 INJ PARAVERT F JNT C/T 2 LEV: CPT | Performed by: PHYSICAL MEDICINE & REHABILITATION

## 2022-02-10 PROCEDURE — 64490 INJ PARAVERT F JNT C/T 1 LEV: CPT | Performed by: PHYSICAL MEDICINE & REHABILITATION

## 2022-02-10 PROCEDURE — 64492 INJ PARAVERT F JNT C/T 3 LEV: CPT | Performed by: PHYSICAL MEDICINE & REHABILITATION

## 2022-02-10 RX ORDER — LIDOCAINE HYDROCHLORIDE 10 MG/ML
5 INJECTION, SOLUTION INFILTRATION; PERINEURAL ONCE
Status: COMPLETED | OUTPATIENT
Start: 2022-02-10 | End: 2022-02-10

## 2022-02-10 RX ORDER — DEXAMETHASONE SODIUM PHOSPHATE 10 MG/ML
10 INJECTION, SOLUTION INTRAMUSCULAR; INTRAVENOUS ONCE
Status: COMPLETED | OUTPATIENT
Start: 2022-02-10 | End: 2022-02-10

## 2022-02-10 RX ADMIN — Medication 0.5 MEQ: at 10:34

## 2022-02-10 RX ADMIN — LIDOCAINE HYDROCHLORIDE 5 ML: 10 INJECTION, SOLUTION INFILTRATION; PERINEURAL at 10:34

## 2022-02-10 RX ADMIN — DEXAMETHASONE SODIUM PHOSPHATE 10 MG: 10 INJECTION, SOLUTION INTRAMUSCULAR; INTRAVENOUS at 10:33

## 2022-02-10 NOTE — PROGRESS NOTES
CERVICAL FACET ZYGAPOPHYSEAL JOINT INJECTION               Patient Name: Odell Weston   : 1960  Date: 2/10/2022     Provider: Mendel Soda, MD      Odell Weston is here today for interventional pain management. Preoperatively, the patient presents with cervical facet zygapophyseal joint mediated pain as per history and exam. She has pain in both sides of her neck that is severe with activity. Patient has failed conservative treatment. Patient has significant functional and psychological impairment due to these conditions. A focused physical exam reveals tenderness to palpation over cervical spinous processes from C4 down to T1 with bilateral cervical paraspinal muscle tenderness. Rotation and extension reproduces axial neck pain. Other facet provocative maneuvers are positive. Spurling's is negative bilaterally. Given her symptoms, physical exam findings, impairment in activities of daily living, and lack of response to conservative measures, consideration for cervical facet zygapophyseal intraarticular joint corticosteroid injections was given. Discussed the risks of the procedure including but not limited to bleeding, infection, worsened pain, damage to surrounding structures, side effects, toxicity, allergic reactions to medications used, immune and stress-response dysfunction, fat necrosis, avascular necrosis, skin pigmentation changes, blood sugar elevation, headache, vision changes, need for surgery, as well as catastrophic injury such as vision loss, paralysis, stroke, spinal cord infarction or injury, intrathecal injection, spinal cord puncture, arachnoiditis, bowel or bladder incontinence, loss of use of the arms and/or legs, ventilator dependence, and death. Discussed the risks, benefits, alternative procedures, and alternatives to the procedure including no procedure at all.  Discussed that we cannot undo any permanent neurologic damage or change the course of any underlying disease. After thorough discussion, patient expressed understanding and willingness to proceed. Written consent was obtained and is in the chart. Verbal consent to proceed was obtained. Standard ASIPP guidelines were followed and sterile technique used. Area was cleaned with Betadine three times. Fluoroscopic guidance was used for this procedure. Appropriate pillar views were used to open up the facet joints. Then two 26 gauge 3.5 inch spinal needles were used and each needle was advanced to each facet joint. Negative aspiration was achieved. Contrast dye was not used due to shortage. AP and lateral views were obtained. Lateral image acquisition was suboptimal. A 3 mL injectate containing 1 mL of 10 mg of dexamethasone and 2 mL of 1% preservative-free Lidocaine was equally divided and injected into the joints without difficulty. She tolerated the procedure well, no obvious complications occurred during the procedure. She was appropriately monitored and discharged home in stable condition with her usual motor strength. Post-operative instructions were provided. She will continue anticoagulation uninterrupted.                    [x] Bilateral [] C2-3    [] C3-4   [] Right [] C4-5    [x] C5-6   [] Left [x] C6-7      [x] C7-T1         [place], [facility], [facility] [address]  Phone [phone]

## 2022-02-10 NOTE — PROGRESS NOTES
She has low back pain. Pain gets to a 6/10, can get to a 9/10. Located in both sides of her low back, right side worse than left. Back pain worse than leg pain. She underwent right lumbar L3/4 L4/5 L5/S1 facet inj on 9/8/21 with greater than 80% pain relief. She wished it lasted longer. There is tenderness to palpation over the lower lumbar spinous processes from L2 down to sacrum with bilateral paraspinal muscle tenderness. Rotation and extension reproduces axial low back pain. Other facet provocative maneuvers are positive. Strength is greater than 3/5 in bilateral hip flexion. Straight leg raise is negative bilaterally.    -Bilateral Lumbar L2/3/4/5 medial branch blocks under XR with Dr Mabel Paul. 15 minute procedure.

## 2022-02-11 ENCOUNTER — TELEPHONE (OUTPATIENT)
Dept: PAIN MANAGEMENT | Age: 62
End: 2022-02-11

## 2022-03-12 DIAGNOSIS — F90.2 ADHD (ATTENTION DEFICIT HYPERACTIVITY DISORDER), COMBINED TYPE: ICD-10-CM

## 2022-03-12 RX ORDER — METHYLPHENIDATE HYDROCHLORIDE 18 MG/1
18 TABLET ORAL DAILY
Qty: 30 TABLET | Refills: 0 | Status: SHIPPED | OUTPATIENT
Start: 2022-03-12 | End: 2022-04-19 | Stop reason: SDUPTHER

## 2022-04-13 ENCOUNTER — PATIENT MESSAGE (OUTPATIENT)
Dept: FAMILY MEDICINE CLINIC | Age: 62
End: 2022-04-13

## 2022-04-14 DIAGNOSIS — F90.2 ADHD (ATTENTION DEFICIT HYPERACTIVITY DISORDER), COMBINED TYPE: ICD-10-CM

## 2022-04-15 RX ORDER — METHYLPHENIDATE HYDROCHLORIDE 18 MG/1
18 TABLET ORAL DAILY
Qty: 30 TABLET | Refills: 0 | OUTPATIENT
Start: 2022-04-15 | End: 2022-05-15

## 2022-04-19 DIAGNOSIS — F90.2 ADHD (ATTENTION DEFICIT HYPERACTIVITY DISORDER), COMBINED TYPE: ICD-10-CM

## 2022-04-19 RX ORDER — METHYLPHENIDATE HYDROCHLORIDE 18 MG/1
18 TABLET ORAL DAILY
Qty: 30 TABLET | Refills: 0 | Status: SHIPPED | OUTPATIENT
Start: 2022-04-19 | End: 2022-05-13 | Stop reason: SDUPTHER

## 2022-04-19 NOTE — TELEPHONE ENCOUNTER
is calling in requesting a refill on medication(s). Medication Requested  Requested Prescriptions     Pending Prescriptions Disp Refills    methylphenidate (CONCERTA) 18 MG extended release tablet 30 tablet 0     Sig: Take 1 tablet by mouth daily for 30 days. 30 day supply. Patient's Last Office Visit  11/23/2021     Patient's Next Visit  Future Appointments   Date Time Provider Stew Casanova   5/10/2022  3:30 PM Francheska Peacock MD [address] [address] [address]  Please send the medication to the pharmacy listed.     Other Comments

## 2022-04-23 DIAGNOSIS — E03.9 HYPOTHYROIDISM, UNSPECIFIED TYPE: ICD-10-CM

## 2022-04-23 DIAGNOSIS — E78.5 HYPERLIPIDEMIA, UNSPECIFIED HYPERLIPIDEMIA TYPE: ICD-10-CM

## 2022-04-23 LAB
ALBUMIN SERPL-MCNC: 4.2 G/DL (ref 3.5–4.6)
ALP BLD-CCNC: 84 U/L (ref 40–130)
ALT SERPL-CCNC: 25 U/L (ref 0–33)
ANION GAP SERPL CALCULATED.3IONS-SCNC: 12 MEQ/L (ref 9–15)
AST SERPL-CCNC: 19 U/L (ref 0–35)
BASOPHILS ABSOLUTE: 0 K/UL (ref 0–0.2)
BASOPHILS RELATIVE PERCENT: 0.8 %
BILIRUB SERPL-MCNC: 0.5 MG/DL (ref 0.2–0.7)
BUN BLDV-MCNC: 16 MG/DL (ref 8–23)
CALCIUM SERPL-MCNC: 9.2 MG/DL (ref 8.5–9.9)
CHLORIDE BLD-SCNC: 107 MEQ/L (ref 95–107)
CHOLESTEROL, TOTAL: 156 MG/DL (ref 0–199)
CO2: 22 MEQ/L (ref 20–31)
CREAT SERPL-MCNC: 0.76 MG/DL (ref 0.5–0.9)
EOSINOPHILS ABSOLUTE: 0.1 K/UL (ref 0–0.7)
EOSINOPHILS RELATIVE PERCENT: 1.9 %
GFR AFRICAN AMERICAN: >60
GFR NON-AFRICAN AMERICAN: >60
GLOBULIN: 2.5 G/DL (ref 2.3–3.5)
GLUCOSE BLD-MCNC: 102 MG/DL (ref 70–99)
HCT VFR BLD CALC: 38.5 % (ref 37–47)
HDLC SERPL-MCNC: 57 MG/DL (ref 40–59)
HEMOGLOBIN: 12.8 G/DL (ref 12–16)
LDL CHOLESTEROL CALCULATED: 84 MG/DL (ref 0–129)
LYMPHOCYTES ABSOLUTE: 1.8 K/UL (ref 1–4.8)
LYMPHOCYTES RELATIVE PERCENT: 36.7 %
MCH RBC QN AUTO: 29 PG (ref 27–31.3)
MCHC RBC AUTO-ENTMCNC: 33.2 % (ref 33–37)
MCV RBC AUTO: 87.4 FL (ref 82–100)
MONOCYTES ABSOLUTE: 0.3 K/UL (ref 0.2–0.8)
MONOCYTES RELATIVE PERCENT: 6.5 %
NEUTROPHILS ABSOLUTE: 2.6 K/UL (ref 1.4–6.5)
NEUTROPHILS RELATIVE PERCENT: 54.1 %
PDW BLD-RTO: 13.5 % (ref 11.5–14.5)
PLATELET # BLD: 237 K/UL (ref 130–400)
POTASSIUM SERPL-SCNC: 4.3 MEQ/L (ref 3.4–4.9)
RBC # BLD: 4.41 M/UL (ref 4.2–5.4)
SODIUM BLD-SCNC: 141 MEQ/L (ref 135–144)
T4 FREE: 1.34 NG/DL (ref 0.84–1.68)
TOTAL PROTEIN: 6.7 G/DL (ref 6.3–8)
TRIGL SERPL-MCNC: 74 MG/DL (ref 0–150)
TSH REFLEX: 0.1 UIU/ML (ref 0.44–3.86)
VITAMIN D 25-HYDROXY: 40.3 NG/ML
WBC # BLD: 4.8 K/UL (ref 4.8–10.8)

## 2022-05-10 ENCOUNTER — TELEPHONE (OUTPATIENT)
Dept: FAMILY MEDICINE CLINIC | Age: 62
End: 2022-05-10

## 2022-05-10 ENCOUNTER — OFFICE VISIT (OUTPATIENT)
Dept: FAMILY MEDICINE CLINIC | Age: 62
End: 2022-05-10
Payer: COMMERCIAL

## 2022-05-10 VITALS
OXYGEN SATURATION: 97 % | RESPIRATION RATE: 12 BRPM | WEIGHT: 165 LBS | HEIGHT: 64 IN | DIASTOLIC BLOOD PRESSURE: 70 MMHG | BODY MASS INDEX: 28.17 KG/M2 | HEART RATE: 82 BPM | TEMPERATURE: 97.7 F | SYSTOLIC BLOOD PRESSURE: 124 MMHG

## 2022-05-10 DIAGNOSIS — E78.5 HYPERLIPIDEMIA, UNSPECIFIED HYPERLIPIDEMIA TYPE: ICD-10-CM

## 2022-05-10 DIAGNOSIS — M54.50 CHRONIC RIGHT-SIDED LOW BACK PAIN WITHOUT SCIATICA: Primary | ICD-10-CM

## 2022-05-10 DIAGNOSIS — E03.9 HYPOTHYROIDISM, UNSPECIFIED TYPE: ICD-10-CM

## 2022-05-10 DIAGNOSIS — G89.29 CHRONIC RIGHT-SIDED LOW BACK PAIN WITHOUT SCIATICA: Primary | ICD-10-CM

## 2022-05-10 DIAGNOSIS — F90.2 ADHD (ATTENTION DEFICIT HYPERACTIVITY DISORDER), COMBINED TYPE: ICD-10-CM

## 2022-05-10 DIAGNOSIS — D72.819 LEUKOPENIA, UNSPECIFIED TYPE: ICD-10-CM

## 2022-05-10 PROCEDURE — 99213 OFFICE O/P EST LOW 20 MIN: CPT | Performed by: INTERNAL MEDICINE

## 2022-05-10 RX ORDER — METHYLPHENIDATE HYDROCHLORIDE 18 MG/1
18 TABLET ORAL DAILY
Qty: 30 TABLET | Refills: 0 | Status: CANCELLED | OUTPATIENT
Start: 2022-05-10 | End: 2022-06-09

## 2022-05-10 ASSESSMENT — PATIENT HEALTH QUESTIONNAIRE - PHQ9
SUM OF ALL RESPONSES TO PHQ QUESTIONS 1-9: 0
SUM OF ALL RESPONSES TO PHQ9 QUESTIONS 1 & 2: 0
5. POOR APPETITE OR OVEREATING: 0
SUM OF ALL RESPONSES TO PHQ QUESTIONS 1-9: 0
SUM OF ALL RESPONSES TO PHQ QUESTIONS 1-9: 0
8. MOVING OR SPEAKING SO SLOWLY THAT OTHER PEOPLE COULD HAVE NOTICED. OR THE OPPOSITE, BEING SO FIGETY OR RESTLESS THAT YOU HAVE BEEN MOVING AROUND A LOT MORE THAN USUAL: 0
3. TROUBLE FALLING OR STAYING ASLEEP: 0
9. THOUGHTS THAT YOU WOULD BE BETTER OFF DEAD, OR OF HURTING YOURSELF: 0
7. TROUBLE CONCENTRATING ON THINGS, SUCH AS READING THE NEWSPAPER OR WATCHING TELEVISION: 0
2. FEELING DOWN, DEPRESSED OR HOPELESS: 0
1. LITTLE INTEREST OR PLEASURE IN DOING THINGS: 0
10. IF YOU CHECKED OFF ANY PROBLEMS, HOW DIFFICULT HAVE THESE PROBLEMS MADE IT FOR YOU TO DO YOUR WORK, TAKE CARE OF THINGS AT HOME, OR GET ALONG WITH OTHER PEOPLE: 0
4. FEELING TIRED OR HAVING LITTLE ENERGY: 0
6. FEELING BAD ABOUT YOURSELF - OR THAT YOU ARE A FAILURE OR HAVE LET YOURSELF OR YOUR FAMILY DOWN: 0
SUM OF ALL RESPONSES TO PHQ QUESTIONS 1-9: 0

## 2022-05-10 ASSESSMENT — ENCOUNTER SYMPTOMS
SHORTNESS OF BREATH: 0
EYE PAIN: 0
BACK PAIN: 0
ABDOMINAL PAIN: 0

## 2022-05-10 NOTE — PROGRESS NOTES
Subjective:      Patient ID: Yin García is a 65 y.o. female who presents today with:  Chief Complaint   Patient presents with    ADHD    Discuss Labs       HPI    Here for follow up   Past Medical History:   Diagnosis Date    Colon polyposis 2013    multiple on colonoscopy, do [id number]    Depression     Esophageal web 2013    Fibromyalgia 2011    GERD (gastroesophageal reflux disease)     Hyperlipidemia     Hypothyroidism     Macular degeneration     left eye    Normal nuclear stress test 3/21/13     Past Surgical History:   Procedure Laterality Date     SECTION      CHOLECYSTECTOMY  13    Lapchole with gram    COLONOSCOPY  2013    HERNIA REPAIR      HYSTERECTOMY      still with one ovary    UPPER GASTROINTESTINAL ENDOSCOPY  2013     BREAST FINE NEEDLE ASPIRATION Bilateral      Social History     Socioeconomic History    Marital status:      Spouse name: Not on file    Number of children: Not on file    Years of education: Not on file    Highest education level: Not on file   Occupational History    Occupation:      Employer: [employer]   Tobacco Use    Smoking status: Former Smoker     Packs/day: 0.25     Years: 35.00     Pack years: 8.75     Start date: 2013     Quit date: 2019     Years since quitting: 3.1    Smokeless tobacco: Never Used   Vaping Use    Vaping Use: Former    Substances: Always   Substance and Sexual Activity    Alcohol use: No     Alcohol/week: 0.0 standard drinks    Drug use: Not Currently    Sexual activity: Yes     Partners: Male   Other Topics Concern    Not on file   Social History Narrative    Not on file     Social Determinants of Health     Financial Resource Strain: Low Risk     Difficulty of Paying Living Expenses: Not hard at all   Food Insecurity: No Food Insecurity    Worried About [address] in the Last Year: Never true    Rudy of Food in the Last Year: Never true Transportation Needs:     Lack of Transportation (Medical): Not on file    Lack of Transportation (Non-Medical): Not on file   Physical Activity:     Days of Exercise per Week: Not on file    Minutes of Exercise per Session: Not on file   Stress:     Feeling of Stress : Not on file   Social Connections:     Frequency of Communication with Friends and Family: Not on file    Frequency of Social Gatherings with Friends and Family: Not on file    Attends Sabianist Services: Not on file    Active Member of [address] [place] [address] or Organizations: Not on file    Attends Club or Organization Meetings: Not on file    Marital Status: Not on file   Intimate Partner Violence:     Fear of Current or Ex-Partner: Not on file    Emotionally Abused: Not on file    Physically Abused: Not on file    Sexually Abused: Not on file   Housing Stability:     Unable to Pay for Housing in the Last Year: Not on file    Number of [place] in the Last Year: Not on file    Unstable Housing in the Last Year: Not on file     No Known Allergies  Current Outpatient Medications on File Prior to Visit   Medication Sig Dispense Refill    methylphenidate (CONCERTA) 18 MG extended release tablet Take 1 tablet by mouth daily for 30 days. 30 day supply.  30 tablet 0    escitalopram (LEXAPRO) 20 MG tablet Take 1 tablet by mouth daily 90 tablet 3    rosuvastatin (CRESTOR) 40 MG tablet Take 1 tablet by mouth daily 90 tablet 3    omeprazole (PRILOSEC) 40 MG delayed release capsule Take 1 capsule by mouth daily 90 capsule 3    levothyroxine (SYNTHROID) 100 MCG tablet TAKE 1 TABLET BY MOUTH DAILY 90 tablet 3    Cholecalciferol (VITAMIN D3) 125 MCG (5000 UT) TABS Take by mouth Take one tablet po once daily four days out of the week      vitamin B-12 (CYANOCOBALAMIN) 100 MCG tablet Take 50 mcg by mouth daily      polyethylene glycol (MIRALAX) 17 GM/SCOOP powder Take 17 g by mouth daily 1 Bottle 1     No current facility-administered medications on file prior to visit. I have personally reviewed the ROS, PMH, PFH, and social history     Review of Systems   Constitutional: Negative for chills and fever. HENT: Negative for congestion. Eyes: Negative for pain. Respiratory: Negative for shortness of breath. Cardiovascular: Negative for chest pain. Gastrointestinal: Negative for abdominal pain. Genitourinary: Negative for hematuria. Musculoskeletal: Negative for back pain. Allergic/Immunologic: Negative for immunocompromised state. Neurological: Negative for headaches. Psychiatric/Behavioral: Negative for hallucinations. Objective:   /70   Pulse 82   Temp 97.7 °F (36.5 °C)   Resp 12   Ht 5' 4\" (1.626 m)   Wt 165 lb (74.8 kg)   SpO2 97%   BMI 28.32 kg/m²     Physical Exam  Constitutional:       General: She is not in acute distress. Appearance: Normal appearance. She is not ill-appearing, toxic-appearing or diaphoretic. HENT:      Head: Normocephalic. Neck:      Vascular: No carotid bruit. Cardiovascular:      Rate and Rhythm: Normal rate and regular rhythm. Pulses: Normal pulses. Heart sounds: Normal heart sounds. No murmur heard. No friction rub. No gallop. Pulmonary:      Effort: Pulmonary effort is normal. No respiratory distress. Breath sounds: Normal breath sounds. No wheezing, rhonchi or rales. Abdominal:      General: Abdomen is flat. There is no distension. Palpations: Abdomen is soft. Tenderness: There is no abdominal tenderness. There is no right CVA tenderness, left CVA tenderness, guarding or rebound. Musculoskeletal:      Cervical back: Neck supple. Right lower leg: No edema. Left lower leg: No edema. Skin:     General: Skin is warm. Findings: No erythema or rash. Neurological:      Mental Status: She is alert. Psychiatric:         Mood and Affect: Mood normal.       No saddle anesthesia     Assessment:       Diagnosis Orders   1.  Chronic right-sided low back pain without sciatica     2. ADHD (attention deficit hyperactivity disorder), combined type           Plan:     vc.  She will schedule 4th covid shot (2nd booster)   Continue chronic medications  Please see ob/gyn  (05/10/2022 )    nuclear stres test not done yet (08/2021)  Repeat tsh/free t4, if still (tsh) LOW, will lower synthroid               No orders of the defined types were placed in this encounter. No orders of the defined types were placed in this encounter. chronic back pain  intermittent on right  No urinary incontinence, no urinary retention, no fevers, no chills, no numbness in or around groin, no weakness. (or saddle anesthesia)  Concerta improves her ability to function at work and stay on task  No se noted  Agreeable to leaving dose as is   Sleeps well  Eats well  No change in her weight. No current back pain  It's intermittent   Doing well with lexapro  No si/hi  If anything should change or worsen call ASAP, don't wait for next scheduled appointment. No follow-ups on file.       Emmett Patel MD

## 2022-05-11 DIAGNOSIS — F90.2 ADHD (ATTENTION DEFICIT HYPERACTIVITY DISORDER), COMBINED TYPE: ICD-10-CM

## 2022-05-12 ENCOUNTER — IMMUNIZATION (OUTPATIENT)
Dept: FAMILY MEDICINE CLINIC | Age: 62
End: 2022-05-12
Payer: COMMERCIAL

## 2022-05-12 ENCOUNTER — PATIENT MESSAGE (OUTPATIENT)
Dept: FAMILY MEDICINE CLINIC | Age: 62
End: 2022-05-12

## 2022-05-12 DIAGNOSIS — M54.50 CHRONIC RIGHT-SIDED LOW BACK PAIN WITHOUT SCIATICA: ICD-10-CM

## 2022-05-12 DIAGNOSIS — F90.2 ADHD (ATTENTION DEFICIT HYPERACTIVITY DISORDER), COMBINED TYPE: ICD-10-CM

## 2022-05-12 DIAGNOSIS — E03.9 HYPOTHYROIDISM, UNSPECIFIED TYPE: ICD-10-CM

## 2022-05-12 DIAGNOSIS — G89.29 CHRONIC RIGHT-SIDED LOW BACK PAIN WITHOUT SCIATICA: ICD-10-CM

## 2022-05-12 DIAGNOSIS — E78.5 HYPERLIPIDEMIA, UNSPECIFIED HYPERLIPIDEMIA TYPE: ICD-10-CM

## 2022-05-12 LAB
BASOPHILS ABSOLUTE: 0 K/UL (ref 0–0.2)
BASOPHILS RELATIVE PERCENT: 0.9 %
EOSINOPHILS ABSOLUTE: 0.1 K/UL (ref 0–0.7)
EOSINOPHILS RELATIVE PERCENT: 1.6 %
HCT VFR BLD CALC: 40.6 % (ref 37–47)
HEMOGLOBIN: 12.9 G/DL (ref 12–16)
LYMPHOCYTES ABSOLUTE: 1.6 K/UL (ref 1–4.8)
LYMPHOCYTES RELATIVE PERCENT: 34.4 %
MCH RBC QN AUTO: 28.7 PG (ref 27–31.3)
MCHC RBC AUTO-ENTMCNC: 31.8 % (ref 33–37)
MCV RBC AUTO: 90.4 FL (ref 82–100)
MONOCYTES ABSOLUTE: 0.3 K/UL (ref 0.2–0.8)
MONOCYTES RELATIVE PERCENT: 7.2 %
NEUTROPHILS ABSOLUTE: 2.5 K/UL (ref 1.4–6.5)
NEUTROPHILS RELATIVE PERCENT: 55.9 %
PDW BLD-RTO: 14.2 % (ref 11.5–14.5)
PLATELET # BLD: 250 K/UL (ref 130–400)
RBC # BLD: 4.49 M/UL (ref 4.2–5.4)
T4 FREE: 1.4 NG/DL (ref 0.84–1.68)
TSH REFLEX: 0.16 UIU/ML (ref 0.44–3.86)
WBC # BLD: 4.5 K/UL (ref 4.8–10.8)

## 2022-05-12 PROCEDURE — 91306 COVID-19, MODERNA BOOSTER VACCINE 0.25ML DOSE: CPT | Performed by: FAMILY MEDICINE

## 2022-05-12 PROCEDURE — 0064A COVID-19, MODERNA BOOSTER VACCINE 0.25ML DOSE: CPT | Performed by: FAMILY MEDICINE

## 2022-05-13 RX ORDER — LEVOTHYROXINE SODIUM 0.07 MG/1
75 TABLET ORAL DAILY
Qty: 30 TABLET | Refills: 1 | Status: SHIPPED | OUTPATIENT
Start: 2022-05-13 | End: 2022-07-24

## 2022-05-13 RX ORDER — METHYLPHENIDATE HYDROCHLORIDE 18 MG/1
18 TABLET ORAL DAILY
Qty: 90 TABLET | Refills: 0 | Status: SHIPPED | OUTPATIENT
Start: 2022-05-18 | End: 2022-06-18 | Stop reason: SDUPTHER

## 2022-05-13 NOTE — TELEPHONE ENCOUNTER
is calling in requesting a refill on medication(s). Medication Requested  Requested Prescriptions     Pending Prescriptions Disp Refills    levothyroxine (SYNTHROID) 75 MCG tablet 30 tablet 0     Sig: Take 1 tablet by mouth daily        Patient's Last Office Visit  5/10/2022     Patient's Next Visit  No future appointments. Pharmacy  Please send the medication to the pharmacy listed.     Other Comments

## 2022-05-19 DIAGNOSIS — F90.2 ADHD (ATTENTION DEFICIT HYPERACTIVITY DISORDER), COMBINED TYPE: ICD-10-CM

## 2022-05-19 RX ORDER — METHYLPHENIDATE HYDROCHLORIDE 18 MG/1
18 TABLET ORAL DAILY
Qty: 90 TABLET | Refills: 0 | Status: CANCELLED | OUTPATIENT
Start: 2022-05-19 | End: 2022-08-17

## 2022-06-18 DIAGNOSIS — F90.2 ADHD (ATTENTION DEFICIT HYPERACTIVITY DISORDER), COMBINED TYPE: ICD-10-CM

## 2022-06-19 RX ORDER — METHYLPHENIDATE HYDROCHLORIDE 18 MG/1
18 TABLET ORAL DAILY
Qty: 30 TABLET | Refills: 0 | Status: SHIPPED | OUTPATIENT
Start: 2022-06-19 | End: 2022-08-10 | Stop reason: CLARIF

## 2022-06-19 RX ORDER — METHYLPHENIDATE HYDROCHLORIDE 18 MG/1
18 TABLET ORAL EVERY MORNING
Qty: 30 TABLET | Refills: 0 | Status: SHIPPED | OUTPATIENT
Start: 2022-07-19 | End: 2022-08-10 | Stop reason: CLARIF

## 2022-07-14 ENCOUNTER — NURSE TRIAGE (OUTPATIENT)
Dept: OTHER | Facility: CLINIC | Age: 62
End: 2022-07-14

## 2022-07-14 NOTE — TELEPHONE ENCOUNTER
Received call from Ilya Garcia at [facility] with [email or web address]. Subjective: Caller states \"I have lower back pain and I just had urinary incontinence. I do have pain shooting down my left leg. I am requesting a new pain management doctor. \"     Current Symptoms: lower back pain, urinary incontinence     Onset:  Urinary Incontinence (July 1), back pain (worse this week, lower back)    Pain Severity: 7/10; radiating to lower leg(s) on left; dull constant ache     Temperature: None     What has been tried: Advil, tylenol, heating pad     Recommended disposition: Go to ED Now connected with Valerio Cooley in the office for further advice. Care advice provided, patient verbalizes understanding; denies any other questions or concerns; instructed to call back for any new or worsening symptoms. Patient/caller agrees to proceed to nearest [facility]      Attention Provider: Thank you for allowing me to participate in the care of your patient. The patient was connected to triage in response to information provided to the ECC/PSC. Please do not respond through this encounter as the response is not directed to a shared pool.       Reason for Disposition   Loss of bladder or bowel control (urine or bowel incontinence; wetting self, leaking stool) of new-onset    Protocols used: BACK PAIN-ADULT-[place]

## 2022-07-24 RX ORDER — LEVOTHYROXINE SODIUM 0.07 MG/1
75 TABLET ORAL DAILY
Qty: 30 TABLET | Refills: 0 | Status: SHIPPED | OUTPATIENT
Start: 2022-07-24 | End: 2022-09-01

## 2022-07-24 NOTE — TELEPHONE ENCOUNTER
Rx requested:  Requested Prescriptions     Pending Prescriptions Disp Refills    levothyroxine (SYNTHROID) 75 MCG tablet [Pharmacy Med Name: LEVOTHYROXINE 0.075MG (75MCG) TABS] 30 tablet 1     Sig: TAKE 1 TABLET BY MOUTH DAILY         Last Office Visit:   5/10/2022      Next Visit Date:  Future Appointments   Date Time Provider Stew Casanova   8/10/2022  9:00 AM T[facility]

## 2022-08-10 ENCOUNTER — OFFICE VISIT (OUTPATIENT)
Dept: FAMILY MEDICINE CLINIC | Age: 62
End: 2022-08-10
Payer: COMMERCIAL

## 2022-08-10 VITALS
DIASTOLIC BLOOD PRESSURE: 82 MMHG | BODY MASS INDEX: 30.38 KG/M2 | TEMPERATURE: 97 F | WEIGHT: 177 LBS | SYSTOLIC BLOOD PRESSURE: 138 MMHG | OXYGEN SATURATION: 97 % | HEART RATE: 68 BPM

## 2022-08-10 DIAGNOSIS — G89.29 CHRONIC LOW BACK PAIN, UNSPECIFIED BACK PAIN LATERALITY, UNSPECIFIED WHETHER SCIATICA PRESENT: ICD-10-CM

## 2022-08-10 DIAGNOSIS — G89.29 CHRONIC LOW BACK PAIN, UNSPECIFIED BACK PAIN LATERALITY, UNSPECIFIED WHETHER SCIATICA PRESENT: Primary | ICD-10-CM

## 2022-08-10 DIAGNOSIS — M54.2 NECK PAIN: ICD-10-CM

## 2022-08-10 DIAGNOSIS — M25.511 ACUTE PAIN OF RIGHT SHOULDER: ICD-10-CM

## 2022-08-10 DIAGNOSIS — M54.50 CHRONIC LOW BACK PAIN, UNSPECIFIED BACK PAIN LATERALITY, UNSPECIFIED WHETHER SCIATICA PRESENT: Primary | ICD-10-CM

## 2022-08-10 DIAGNOSIS — M54.50 CHRONIC LOW BACK PAIN, UNSPECIFIED BACK PAIN LATERALITY, UNSPECIFIED WHETHER SCIATICA PRESENT: ICD-10-CM

## 2022-08-10 LAB
ALBUMIN SERPL-MCNC: 4.7 G/DL (ref 3.5–4.6)
ALP BLD-CCNC: 96 U/L (ref 40–130)
ALT SERPL-CCNC: 32 U/L (ref 0–33)
ANION GAP SERPL CALCULATED.3IONS-SCNC: 13 MEQ/L (ref 9–15)
AST SERPL-CCNC: 26 U/L (ref 0–35)
BASOPHILS ABSOLUTE: 0 K/UL (ref 0–0.2)
BASOPHILS RELATIVE PERCENT: 0.9 %
BILIRUB SERPL-MCNC: 0.9 MG/DL (ref 0.2–0.7)
BILIRUBIN URINE: NEGATIVE
BLOOD, URINE: NEGATIVE
BUN BLDV-MCNC: 14 MG/DL (ref 8–23)
C-REACTIVE PROTEIN: <3 MG/L (ref 0–5)
CALCIUM SERPL-MCNC: 9.9 MG/DL (ref 8.5–9.9)
CHLORIDE BLD-SCNC: 103 MEQ/L (ref 95–107)
CLARITY: CLEAR
CO2: 23 MEQ/L (ref 20–31)
COLOR: YELLOW
CREAT SERPL-MCNC: 0.81 MG/DL (ref 0.5–0.9)
EOSINOPHILS ABSOLUTE: 0.1 K/UL (ref 0–0.7)
EOSINOPHILS RELATIVE PERCENT: 1.7 %
GFR AFRICAN AMERICAN: >60
GFR NON-AFRICAN AMERICAN: >60
GLOBULIN: 2.8 G/DL (ref 2.3–3.5)
GLUCOSE BLD-MCNC: 104 MG/DL (ref 70–99)
GLUCOSE URINE: NEGATIVE MG/DL
HCT VFR BLD CALC: 40.6 % (ref 37–47)
HEMOGLOBIN: 13.6 G/DL (ref 12–16)
KETONES, URINE: NEGATIVE MG/DL
LEUKOCYTE ESTERASE, URINE: NEGATIVE
LYMPHOCYTES ABSOLUTE: 1.9 K/UL (ref 1–4.8)
LYMPHOCYTES RELATIVE PERCENT: 33.6 %
MCH RBC QN AUTO: 29.4 PG (ref 27–31.3)
MCHC RBC AUTO-ENTMCNC: 33.4 % (ref 33–37)
MCV RBC AUTO: 88 FL (ref 82–100)
MONOCYTES ABSOLUTE: 0.3 K/UL (ref 0.2–0.8)
MONOCYTES RELATIVE PERCENT: 5.7 %
NEUTROPHILS ABSOLUTE: 3.3 K/UL (ref 1.4–6.5)
NEUTROPHILS RELATIVE PERCENT: 58.1 %
NITRITE, URINE: NEGATIVE
PDW BLD-RTO: 14.4 % (ref 11.5–14.5)
PH UA: 7 (ref 5–9)
PLATELET # BLD: 292 K/UL (ref 130–400)
POTASSIUM SERPL-SCNC: 4.6 MEQ/L (ref 3.4–4.9)
PROTEIN UA: NEGATIVE MG/DL
RBC # BLD: 4.61 M/UL (ref 4.2–5.4)
SEDIMENTATION RATE, ERYTHROCYTE: 9 MM (ref 0–30)
SODIUM BLD-SCNC: 139 MEQ/L (ref 135–144)
SPECIFIC GRAVITY UA: 1.01 (ref 1–1.03)
T4 FREE: 1.11 NG/DL (ref 0.84–1.68)
TOTAL PROTEIN: 7.5 G/DL (ref 6.3–8)
TSH REFLEX: 2.85 UIU/ML (ref 0.44–3.86)
URINE REFLEX TO CULTURE: NORMAL
UROBILINOGEN, URINE: 0.2 E.U./DL
WBC # BLD: 5.6 K/UL (ref 4.8–10.8)

## 2022-08-10 PROCEDURE — 93000 ELECTROCARDIOGRAM COMPLETE: CPT | Performed by: INTERNAL MEDICINE

## 2022-08-10 PROCEDURE — 99215 OFFICE O/P EST HI 40 MIN: CPT | Performed by: INTERNAL MEDICINE

## 2022-08-10 ASSESSMENT — ENCOUNTER SYMPTOMS
ABDOMINAL PAIN: 0
SHORTNESS OF BREATH: 0
BACK PAIN: 0
EYE PAIN: 0

## 2022-08-10 ASSESSMENT — PATIENT HEALTH QUESTIONNAIRE - PHQ9
2. FEELING DOWN, DEPRESSED OR HOPELESS: 0
1. LITTLE INTEREST OR PLEASURE IN DOING THINGS: 0
SUM OF ALL RESPONSES TO PHQ QUESTIONS 1-9: 0
SUM OF ALL RESPONSES TO PHQ QUESTIONS 1-9: 0
SUM OF ALL RESPONSES TO PHQ9 QUESTIONS 1 & 2: 0
SUM OF ALL RESPONSES TO PHQ QUESTIONS 1-9: 0
SUM OF ALL RESPONSES TO PHQ QUESTIONS 1-9: 0

## 2022-08-10 NOTE — PROGRESS NOTES
Subjective:      Patient ID: Ap Balderrama is a 65 y.o. female who presents today with:  Chief Complaint   Patient presents with    Follow-up     Pt is here today for incontinence, back pain       HPI    No longer has back pain  She did not go to er  She had incontinence of urine right before fourth of July. At the same time she was having radicular type pain down her left leg  She did not get her mri cervical spine done earlier . Complains of right arm pain shooting down as well.      Past Medical History:   Diagnosis Date    Colon polyposis 2013    multiple on colonoscopy, do [id number]    Depression     Esophageal web 2013    Fibromyalgia 2011    GERD (gastroesophageal reflux disease)     Hyperlipidemia     Hypothyroidism     Macular degeneration     left eye    Normal nuclear stress test 3/21/13     Past Surgical History:   Procedure Laterality Date     SECTION      CHOLECYSTECTOMY  13    Lapchole with gram    COLONOSCOPY  2013    HERNIA REPAIR      HYSTERECTOMY (CERVIX STATUS UNKNOWN)      still with one ovary    UPPER GASTROINTESTINAL ENDOSCOPY  2013     BREAST FINE NEEDLE ASPIRATION Bilateral      Social History     Socioeconomic History    Marital status:      Spouse name: Not on file    Number of children: Not on file    Years of education: Not on file    Highest education level: Not on file   Occupational History    Occupation:      Employer: [employer]   Tobacco Use    Smoking status: Former     Packs/day: 0.25     Years: 35.00     Pack years: 8.75     Types: Cigarettes     Start date: 2013     Quit date: 2019     Years since quitting: 3.3    Smokeless tobacco: Never   Vaping Use    Vaping Use: Former    Substances: Always   Substance and Sexual Activity    Alcohol use: No     Alcohol/week: 0.0 standard drinks    Drug use: Not Currently    Sexual activity: Yes     Partners: Male   Other Topics Concern    Not on file   Social History Narrative Not on file     Social Determinants of Health     Financial Resource Strain: Low Risk     Difficulty of Paying Living Expenses: Not hard at all   Food Insecurity: No Food Insecurity    Worried About Running Out of Food in the Last Year: Never true    Ran Out of Food in the Last Year: Never true   Transportation Needs: Not on file   Physical Activity: Not on file   Stress: Not on file   Social Connections: Not on file   Intimate Partner Violence: Not on file   Housing Stability: Not on file     No Known Allergies  Current Outpatient Medications on File Prior to Visit   Medication Sig Dispense Refill    levothyroxine (SYNTHROID) 75 MCG tablet TAKE 1 TABLET BY MOUTH DAILY 30 tablet 0    escitalopram (LEXAPRO) 20 MG tablet Take 1 tablet by mouth daily 90 tablet 3    rosuvastatin (CRESTOR) 40 MG tablet Take 1 tablet by mouth daily 90 tablet 3    omeprazole (PRILOSEC) 40 MG delayed release capsule Take 1 capsule by mouth daily 90 capsule 3    Cholecalciferol (VITAMIN D3) 125 MCG (5000 UT) TABS Take by mouth Take one tablet po once daily four days out of the week      vitamin B-12 (CYANOCOBALAMIN) 100 MCG tablet Take 50 mcg by mouth daily      polyethylene glycol (MIRALAX) 17 GM/SCOOP powder Take 17 g by mouth daily 1 Bottle 1     No current facility-administered medications on file prior to visit. I have personally reviewed the ROS, PMH, PFH, and social history     Review of Systems   Constitutional:  Negative for chills and fever. HENT:  Negative for congestion. Eyes:  Negative for pain. Respiratory:  Negative for shortness of breath. Cardiovascular:  Negative for chest pain. Gastrointestinal:  Negative for abdominal pain. Genitourinary:  Negative for hematuria. Musculoskeletal:  Negative for back pain. Arm pain   Allergic/Immunologic: Negative for immunocompromised state. Neurological:  Negative for headaches. Psychiatric/Behavioral:  Negative for hallucinations.       Objective:   BP 138/82 (Site: Right Upper Arm, Position: Sitting)   Pulse 68   Temp 97 °F (36.1 °C) (Temporal)   Wt 177 lb (80.3 kg)   SpO2 97%   BMI 30.38 kg/m²     Physical Exam  Constitutional:       General: She is not in acute distress. Appearance: Normal appearance. She is not ill-appearing, toxic-appearing or diaphoretic. HENT:      Head: Normocephalic. Neck:      Vascular: No carotid bruit. Cardiovascular:      Rate and Rhythm: Normal rate and regular rhythm. Pulses: Normal pulses. Heart sounds: Normal heart sounds. No murmur heard. No friction rub. No gallop. Pulmonary:      Effort: Pulmonary effort is normal. No respiratory distress. Breath sounds: Normal breath sounds. No wheezing, rhonchi or rales. Abdominal:      General: Abdomen is flat. There is no distension. Palpations: Abdomen is soft. Tenderness: There is no abdominal tenderness. There is no right CVA tenderness, left CVA tenderness, guarding or rebound. Musculoskeletal:      Cervical back: Neck supple. Right lower leg: No edema. Left lower leg: No edema. Skin:     General: Skin is warm. Findings: No erythema or rash. Neurological:      Mental Status: She is alert and oriented to person, place, and time. Psychiatric:         Mood and Affect: Mood normal.     No saddle anesthesia  5/5 muscle strength in bl hip ext/flexors, knee flex/extensors, and plantar and dorsiflexion, except for 4/5 right hip flexor. Negative Straight leg test  Sensation intact to soft touch and prick   No point tenderness over spinous processes. 3+ patellar bl and absent achilles on left, 1+ on right   No palpable step off   Negative babinski  No clonus      Assessment:       Diagnosis Orders   1.  Chronic low back pain, unspecified back pain laterality, unspecified whether sciatica present  CBC with Auto Differential    Comprehensive Metabolic Panel    Sedimentation Rate    C-Reactive Protein    [facility], Amira Vanessa MD, Pain Management, [place]    TSH with Reflex    T4, Free    Urinalysis with Reflex to Culture    EKG 12 Lead - Clinic Performed    Culture, Blood 1    Culture, Blood 2    XR CERVICAL SPINE (4-5 VIEWS)    XR LUMBAR SPINE (MIN 4 VIEWS)    XR SHOULDER RIGHT (MIN 2 VIEWS)    MRI CERVICAL SPINE W WO CONTRAST    MRI LUMBAR SPINE W WO CONTRAST      2.  Acute pain of right shoulder  CBC with Auto Differential    Comprehensive Metabolic Panel    Sedimentation Rate    C-Reactive Protein    Laura Calderon MD, Pain Management, [place]    TSH with Reflex    T4, Free    Urinalysis with Reflex to Culture    EKG 12 Lead - Clinic Performed    Culture, Blood 1    Culture, Blood 2    XR CERVICAL SPINE (4-5 VIEWS)    XR LUMBAR SPINE (MIN 4 VIEWS)    XR SHOULDER RIGHT (MIN 2 VIEWS)      3. Neck pain  CBC with Auto Differential    Comprehensive Metabolic Panel    Sedimentation Rate    C-Reactive Protein    Laura Calderon MD, Pain Management, [place]    TSH with Reflex    T4, Free    Urinalysis with Reflex to Culture    EKG 12 Lead - Clinic Performed    Culture, Blood 1    Culture, Blood 2    XR CERVICAL SPINE (4-5 VIEWS)    XR LUMBAR SPINE (MIN 4 VIEWS)    XR SHOULDER RIGHT (MIN 2 VIEWS)    MRI CERVICAL SPINE W WO CONTRAST    MRI LUMBAR SPINE W WO CONTRAST            Plan:     vc.  Wants different pain mgmt doctor  See orders   F/u after mri           Orders Placed This Encounter   Procedures    Culture, Blood 1     Standing Status:   Future     Standing Expiration Date:   8/10/2023    Culture, Blood 2     Standing Status:   Future     Standing Expiration Date:   8/10/2023    XR CERVICAL SPINE (4-5 VIEWS)     Standing Status:   Future     Standing Expiration Date:   8/10/2023    XR LUMBAR SPINE (MIN 4 VIEWS)     Standing Status:   Future     Standing Expiration Date:   8/10/2023    XR SHOULDER RIGHT (MIN 2 VIEWS)     Standing Status:   Future     Standing Expiration Date:   8/10/2023    MRI CERVICAL SPINE W WO CONTRAST     Standing Status:   Future     Standing Expiration Date:   8/10/2023     Scheduling Instructions:      Soon please     Order Specific Question:   STAT Creatinine as needed:     Answer:   No     Order Specific Question:   Reason for exam:     Answer:   pain     Order Specific Question:   What is the sedation requirement? Answer:   None    MRI LUMBAR SPINE W WO CONTRAST     Standing Status:   Future     Standing Expiration Date:   8/10/2023     Scheduling Instructions:      Soon please     Order Specific Question:   STAT Creatinine as needed:     Answer:   No     Order Specific Question:   Reason for exam:     Answer:   pain     Order Specific Question:   What is the sedation requirement? Answer:   None    CBC with Auto Differential     Standing Status:   Future     Standing Expiration Date:   8/10/2023    Comprehensive Metabolic Panel     Standing Status:   Future     Standing Expiration Date:   8/10/2023    Sedimentation Rate     Standing Status:   Future     Standing Expiration Date:   8/10/2023    C-Reactive Protein     Standing Status:   Future     Standing Expiration Date:   8/10/2023    TSH with Reflex     Standing Status:   Future     Standing Expiration Date:   8/10/2023    T4, Free     Standing Status:   Future     Standing Expiration Date:   8/10/2023    Urinalysis with Reflex to Culture     Standing Status:   Future     Standing Expiration Date:   8/10/2023     Order Specific Question:   SPECIFY(EX-CATH,MIDSTREAM,CYSTO,ETC)?      Answer:   Miguelangel Duncan MD, Pain Management, [place]     Referral Priority:   Routine     Referral Type:   Eval and Treat     Referral Reason:   Specialty Services Required     Referred to Provider:   Shorty Guan MD     Requested Specialty:   Pain Management     Number of Visits Requested:   1    EKG 12 Lead - Clinic Performed     Standing Status:   Future     Standing Expiration Date:   8/10/2023     Order Specific Question:   Reason for Exam?     Answer: Other       No orders of the defined types were placed in this encounter. Risk of permanent spinal cord damage explained  No urinary incontinence, no urinary retention, no fevers, no chills, no numbness in or around groin, no weakness. No saddle anesthesia  No falls, no trauma  Given her symptoms improved will pursue expedite outpatient work up  She knows to go to er  Immediately  To get mri if numbness in groin, incontinence, etc.   If anything should change or worsen call ASAP, don't wait for next scheduled appointment. Return in about 4 weeks (around 9/7/2022) for Chronic condition management/appointment, worsening symptoms, call ASAP for appointment.       Mahendra Chung MD

## 2022-08-15 LAB
BLOOD CULTURE, ROUTINE: NORMAL
CULTURE, BLOOD 2: NORMAL

## 2022-08-23 ENCOUNTER — HOSPITAL ENCOUNTER (OUTPATIENT)
Dept: MRI IMAGING | Age: 62
Discharge: HOME | End: 2022-08-25
Payer: COMMERCIAL

## 2022-08-23 DIAGNOSIS — M54.50 CHRONIC LOW BACK PAIN, UNSPECIFIED BACK PAIN LATERALITY, UNSPECIFIED WHETHER SCIATICA PRESENT: ICD-10-CM

## 2022-08-23 DIAGNOSIS — M54.2 NECK PAIN: ICD-10-CM

## 2022-08-23 DIAGNOSIS — G89.29 CHRONIC LOW BACK PAIN, UNSPECIFIED BACK PAIN LATERALITY, UNSPECIFIED WHETHER SCIATICA PRESENT: ICD-10-CM

## 2022-08-23 PROCEDURE — 72141 MRI NECK SPINE W/O DYE: CPT

## 2022-08-23 PROCEDURE — 72148 MRI LUMBAR SPINE W/O DYE: CPT

## 2022-08-31 NOTE — TELEPHONE ENCOUNTER
Requested Prescriptions     Pending Prescriptions Disp Refills    levothyroxine (SYNTHROID) 75 MCG tablet [Pharmacy Med Name: LEVOTHYROXINE 0.075MG (75MCG) TABS] 30 tablet 0     Sig: TAKE 1 TABLET BY MOUTH DAILY

## 2022-09-01 RX ORDER — LEVOTHYROXINE SODIUM 0.07 MG/1
75 TABLET ORAL DAILY
Qty: 90 TABLET | Refills: 3 | Status: SHIPPED | OUTPATIENT
Start: 2022-09-01

## 2022-09-12 ENCOUNTER — OFFICE VISIT (OUTPATIENT)
Dept: FAMILY MEDICINE CLINIC | Age: 62
End: 2022-09-12
Payer: COMMERCIAL

## 2022-09-12 VITALS
HEART RATE: 66 BPM | BODY MASS INDEX: 30.55 KG/M2 | DIASTOLIC BLOOD PRESSURE: 74 MMHG | WEIGHT: 178 LBS | TEMPERATURE: 97.1 F | SYSTOLIC BLOOD PRESSURE: 120 MMHG | OXYGEN SATURATION: 96 %

## 2022-09-12 DIAGNOSIS — G89.29 CHRONIC LOW BACK PAIN, UNSPECIFIED BACK PAIN LATERALITY, UNSPECIFIED WHETHER SCIATICA PRESENT: ICD-10-CM

## 2022-09-12 DIAGNOSIS — R17 ELEVATED BILIRUBIN: ICD-10-CM

## 2022-09-12 DIAGNOSIS — Z78.0 POST-MENOPAUSAL: ICD-10-CM

## 2022-09-12 DIAGNOSIS — M54.50 CHRONIC LOW BACK PAIN, UNSPECIFIED BACK PAIN LATERALITY, UNSPECIFIED WHETHER SCIATICA PRESENT: ICD-10-CM

## 2022-09-12 DIAGNOSIS — Z12.4 SCREENING FOR CERVICAL CANCER: ICD-10-CM

## 2022-09-12 DIAGNOSIS — M54.2 NECK PAIN: Primary | ICD-10-CM

## 2022-09-12 PROCEDURE — 99214 OFFICE O/P EST MOD 30 MIN: CPT | Performed by: INTERNAL MEDICINE

## 2022-09-12 SDOH — ECONOMIC STABILITY: FOOD INSECURITY: WITHIN THE PAST 12 MONTHS, YOU WORRIED THAT YOUR FOOD WOULD RUN OUT BEFORE YOU GOT MONEY TO BUY MORE.: NEVER TRUE

## 2022-09-12 SDOH — ECONOMIC STABILITY: FOOD INSECURITY: WITHIN THE PAST 12 MONTHS, THE FOOD YOU BOUGHT JUST DIDN'T LAST AND YOU DIDN'T HAVE MONEY TO GET MORE.: NEVER TRUE

## 2022-09-12 ASSESSMENT — SOCIAL DETERMINANTS OF HEALTH (SDOH): HOW HARD IS IT FOR YOU TO PAY FOR THE VERY BASICS LIKE FOOD, HOUSING, MEDICAL CARE, AND HEATING?: NOT HARD AT ALL

## 2022-09-12 ASSESSMENT — ENCOUNTER SYMPTOMS
EYE PAIN: 0
BACK PAIN: 0
ABDOMINAL PAIN: 0
SHORTNESS OF BREATH: 0

## 2022-09-12 NOTE — PROGRESS NOTES
Subjective:      Patient ID: Funmi Garcia is a 65 y.o. female who presents today with:  Chief Complaint   Patient presents with    Follow-up     4 wk f/u       HPI      Here for follow up   With regards to back pain  90 percent better.   And shoulder is \"awesome\"   Past Medical History:   Diagnosis Date    Colon polyposis 2013    multiple on colonoscopy, do [id number]    Depression     Esophageal web 2013    Fibromyalgia 2011    GERD (gastroesophageal reflux disease)     Hyperlipidemia     Hypothyroidism     Macular degeneration     left eye    Normal nuclear stress test 3/21/13     Past Surgical History:   Procedure Laterality Date     SECTION      CHOLECYSTECTOMY  13    Lapchole with gram    COLONOSCOPY  2013    HERNIA REPAIR      HYSTERECTOMY (CERVIX STATUS UNKNOWN)      still with one ovary    UPPER GASTROINTESTINAL ENDOSCOPY  2013     BREAST FINE NEEDLE ASPIRATION Bilateral      Social History     Socioeconomic History    Marital status:      Spouse name: Not on file    Number of children: Not on file    Years of education: Not on file    Highest education level: Not on file   Occupational History    Occupation:      Employer: [employer]   Tobacco Use    Smoking status: Former     Packs/day: 0.25     Years: 35.00     Pack years: 8.75     Types: Cigarettes     Start date: 2013     Quit date: 2019     Years since quitting: 3.4    Smokeless tobacco: Never   Vaping Use    Vaping Use: Former    Substances: Always   Substance and Sexual Activity    Alcohol use: No     Alcohol/week: 0.0 standard drinks    Drug use: Not Currently    Sexual activity: Yes     Partners: Male   Other Topics Concern    Not on file   Social History Narrative    Not on file     Social Determinants of Health     Financial Resource Strain: Low Risk     Difficulty of Paying Living Expenses: Not hard at all   Food Insecurity: No Food Insecurity    Worried About Running Out of Food in the Last Year: Never true    Ran Out of Food in the Last Year: Never true   Transportation Needs: Not on file   Physical Activity: Not on file   Stress: Not on file   Social Connections: Not on file   Intimate Partner Violence: Not on file   Housing Stability: Not on file     No Known Allergies  Current Outpatient Medications on File Prior to Visit   Medication Sig Dispense Refill    levothyroxine (SYNTHROID) 75 MCG tablet TAKE 1 TABLET BY MOUTH DAILY 90 tablet 3    escitalopram (LEXAPRO) 20 MG tablet Take 1 tablet by mouth daily 90 tablet 3    rosuvastatin (CRESTOR) 40 MG tablet Take 1 tablet by mouth daily 90 tablet 3    omeprazole (PRILOSEC) 40 MG delayed release capsule Take 1 capsule by mouth daily 90 capsule 3    Cholecalciferol (VITAMIN D3) 125 MCG (5000 UT) TABS Take by mouth Take one tablet po once daily four days out of the week      vitamin B-12 (CYANOCOBALAMIN) 100 MCG tablet Take 50 mcg by mouth daily      polyethylene glycol (MIRALAX) 17 GM/SCOOP powder Take 17 g by mouth daily 1 Bottle 1     No current facility-administered medications on file prior to visit. I have personally reviewed the ROS, PMH, PFH, and social history     Review of Systems   Constitutional:  Negative for chills and fever. HENT:  Negative for congestion. Eyes:  Negative for pain. Respiratory:  Negative for shortness of breath. Cardiovascular:  Negative for chest pain. Gastrointestinal:  Negative for abdominal pain. Genitourinary:  Negative for hematuria. Musculoskeletal:  Negative for back pain. Allergic/Immunologic: Negative for immunocompromised state. Neurological:  Negative for headaches. Psychiatric/Behavioral:  Negative for hallucinations.       Objective:   /74 (Site: Left Upper Arm, Position: Sitting, Cuff Size: Medium Adult)   Pulse 66   Temp 97.1 °F (36.2 °C) (Temporal)   Wt 178 lb (80.7 kg)   SpO2 96%   BMI 30.55 kg/m²     Physical Exam  Constitutional:       General: She is not in acute distress. Appearance: Normal appearance. She is not ill-appearing, toxic-appearing or diaphoretic. HENT:      Head: Normocephalic. Neck:      Vascular: No carotid bruit. Cardiovascular:      Rate and Rhythm: Normal rate and regular rhythm. Pulses: Normal pulses. Heart sounds: Normal heart sounds. No murmur heard. No friction rub. No gallop. Pulmonary:      Effort: Pulmonary effort is normal. No respiratory distress. Breath sounds: Normal breath sounds. No wheezing, rhonchi or rales. Abdominal:      General: Abdomen is flat. There is no distension. Palpations: Abdomen is soft. Tenderness: There is no abdominal tenderness. There is no right CVA tenderness, left CVA tenderness, guarding or rebound. Musculoskeletal:      Cervical back: Neck supple. Right lower leg: No edema. Left lower leg: No edema. Skin:     General: Skin is warm. Findings: No erythema or rash. Neurological:      Mental Status: She is alert. Psychiatric:         Mood and Affect: Mood normal.     No saddle anesthesia     Assessment:       Diagnosis Orders   1. Neck pain        2. Chronic low back pain, unspecified back pain laterality, unspecified whether sciatica present        3. Elevated bilirubin  Bilirubin Total Direct & Indirect      4.  Post-menopausal  DEXA BONE DENSITY AXIAL SKELETON      5. Screening for cervical cancer  Kartik Pablo MD, OB-GYN, [place]            Plan:     vc.  Has appt with pain mgmt (new provider)  Continue chronic medications  Please see ob/gyn  ([id number] )              Orders Placed This Encounter   Procedures    DEXA BONE DENSITY AXIAL SKELETON     Standing Status:   Future     Standing Expiration Date:   9/12/2023     Order Specific Question:   Reason for exam:     Answer:   post oanh    Bilirubin Total Direct & Indirect     Standing Status:   Future     Standing Expiration Date:   9/12/2023    Kartik Pablo MD, OB-GYN, [place]     Referral Priority:   Routine     Referral Type:   Eval and Treat     Referral Reason:   Specialty Services Required     Referred to Provider:   Rancho Wilkins MD     Requested Specialty:   Obstetrics & Gynecology     Number of Visits Requested:   1     No orders of the defined types were placed in this encounter. Flu shot she will do this season  No urinary incontinence, no urinary retention, no fevers, no chills, no numbness in or around groin, no weakness. No saddle anesthesia. If anything should change or worsen call ASAP, don't wait for next scheduled appointment. Return in about 3 months (around 12/12/2022) for Chronic condition management/appointment, worsening symptoms, call ASAP for appointment.       Mireille Morgan MD

## 2022-09-19 ENCOUNTER — TELEPHONE (OUTPATIENT)
Dept: GASTROENTEROLOGY | Age: 62
End: 2022-09-19

## 2022-09-27 ENCOUNTER — INITIAL CONSULT (OUTPATIENT)
Dept: PAIN MANAGEMENT | Age: 62
End: 2022-09-27
Payer: COMMERCIAL

## 2022-09-27 VITALS
TEMPERATURE: 97.3 F | SYSTOLIC BLOOD PRESSURE: 126 MMHG | HEIGHT: 64 IN | DIASTOLIC BLOOD PRESSURE: 82 MMHG | BODY MASS INDEX: 29.88 KG/M2 | WEIGHT: 175 LBS

## 2022-09-27 DIAGNOSIS — G56.01 CARPAL TUNNEL SYNDROME OF RIGHT WRIST: ICD-10-CM

## 2022-09-27 DIAGNOSIS — M47.812 CERVICAL SPONDYLOSIS WITHOUT MYELOPATHY: Primary | ICD-10-CM

## 2022-09-27 DIAGNOSIS — M12.9 ARTHROPATHY: ICD-10-CM

## 2022-09-27 DIAGNOSIS — M51.36 DDD (DEGENERATIVE DISC DISEASE), LUMBAR: ICD-10-CM

## 2022-09-27 PROCEDURE — 99214 OFFICE O/P EST MOD 30 MIN: CPT | Performed by: PAIN MEDICINE

## 2022-09-27 ASSESSMENT — ENCOUNTER SYMPTOMS
BACK PAIN: 1
NAUSEA: 0
CONSTIPATION: 0
SHORTNESS OF BREATH: 0
DIARRHEA: 0

## 2022-09-27 NOTE — PROGRESS NOTES
[facility]) Physicians  Neurosurgery and Pain [facility]  [address], [address] [facility] , [address], [place]: [phone]  F: [phone]        Critical [facility] hospital [facility]  (1960)    9/27/2022    Subjective:     Verna [place] Joie is 65 y.o. female who complains today of:    Chief Complaint   Patient presents with    Shoulder Pain     Right side    Arm Pain    Pain     ALEA Results in [facility] Cervical and Lumbar       HPI    Patient with a h/o of fibromyalgia. Patient complains of neck pain and right shoulder pain. She also has pain that shoots into the right arm into the entire hand. The patient denies a traumatic or inciting incident. Pain has been gradual and insidious in onset. She has had this neck pain for a few years, but he shoulder and arm pain started in July of 2022. It started out of nowhere. Pain is described as throbbing, aching, stabbing, sharp, and tingling. Pain level today is rated 5 on a 10 point scale. It is severe in nature. With pain medication (Ibuprofen, Tylenol), pain level drops to a 5/10. Without pain medication, pain level can escalate upto a 10/10. Pain is affecting the patients ability to perform activities of daily living especially with regards to personal hygiene, household chores and self care. With pain medication, the patient is better able to perform ADLs. Pain in part is secondary to osteoarthritis of the spine. Pain is aggravated by  laying in bed at night, especially if her neck is in a certain position. She also has GERD, so she has a hard time sleeping. Pain is alleviated by rest and medication. Prior PT:  Yes, she has done it 3 years ago. The emphasis was on a HEP, but it really hasn't been much help. Prior Injections:  She did see Dr. Koko Mcgovern in the past for injections. Prior medications: NSAIDs and analgesics  Prior spine surgeries:  none  Pain is not is associated with fevers/chills/night sweats.   Bowel and bladder are working appropriately. Allergies:  Patient has no known allergies. Past Medical History:   Diagnosis Date    Colon polyposis 2013    multiple on colonoscopy, do [id number]    Depression     Esophageal web 2013    Fibromyalgia 2011    GERD (gastroesophageal reflux disease)     Hyperlipidemia     Hypothyroidism     Macular degeneration     left eye    Normal nuclear stress test 3/21/13     Past Surgical History:   Procedure Laterality Date     SECTION      CHOLECYSTECTOMY  13    Lapchole with gram    COLONOSCOPY  2013    HERNIA REPAIR      HYSTERECTOMY (CERVIX STATUS UNKNOWN)      still with one ovary    UPPER GASTROINTESTINAL ENDOSCOPY  2013    US BREAST FINE NEEDLE ASPIRATION Bilateral      Family History   Problem Relation Age of Onset    Heart Disease Mother         COPD, she has cervical cancer as well. Stroke Mother         No other fdr with cancer, no aneurysm or heart problems. Diabetes Mother     Cancer Father         STOMACH, committed suicide    Other Sister         low thyroid     Other Brother         ?stroke, not verified.      Breast Cancer Maternal Aunt      Social History     Socioeconomic History    Marital status:      Spouse name: Not on file    Number of children: Not on file    Years of education: Not on file    Highest education level: Not on file   Occupational History    Occupation:      Employer: [employer]   Tobacco Use    Smoking status: Former     Packs/day: 0.25     Years: 35.00     Pack years: 8.75     Types: Cigarettes     Start date: 2013     Quit date: 2019     Years since quitting: 3.4    Smokeless tobacco: Never   Vaping Use    Vaping Use: Former    Substances: Always   Substance and Sexual Activity    Alcohol use: No     Alcohol/week: 0.0 standard drinks    Drug use: Not Currently    Sexual activity: Yes     Partners: Male   Other Topics Concern    Not on file   Social History Narrative    Not on file Social Determinants of Health     Financial Resource Strain: Low Risk     Difficulty of Paying Living Expenses: Not hard at all   Food Insecurity: No Food Insecurity    Worried About Running Out of Food in the Last Year: Never true    Ran Out of Food in the Last Year: Never true   Transportation Needs: Not on file   Physical Activity: Not on file   Stress: Not on file   Social Connections: Not on file   Intimate Partner Violence: Not on file   Housing Stability: Not on file       Current Outpatient Medications on File Prior to Visit   Medication Sig Dispense Refill    levothyroxine (SYNTHROID) 75 MCG tablet TAKE 1 TABLET BY MOUTH DAILY 90 tablet 3    escitalopram (LEXAPRO) 20 MG tablet Take 1 tablet by mouth daily 90 tablet 3    rosuvastatin (CRESTOR) 40 MG tablet Take 1 tablet by mouth daily 90 tablet 3    omeprazole (PRILOSEC) 40 MG delayed release capsule Take 1 capsule by mouth daily 90 capsule 3    Cholecalciferol (VITAMIN D3) 125 MCG (5000 UT) TABS Take by mouth Take one tablet po once daily four days out of the week      vitamin B-12 (CYANOCOBALAMIN) 100 MCG tablet Take 50 mcg by mouth daily      polyethylene glycol (MIRALAX) 17 GM/SCOOP powder Take 17 g by mouth daily 1 Bottle 1     No current facility-administered medications on file prior to visit. Review of Systems   Constitutional:  Negative for fever. HENT:  Negative for hearing loss. Respiratory:  Negative for shortness of breath. Gastrointestinal:  Negative for constipation, diarrhea and nausea. Genitourinary:  Negative for difficulty urinating. Musculoskeletal:  Positive for back pain and neck pain. Skin:  Negative for rash. Neurological:  Negative for headaches. Hematological:  Does not bruise/bleed easily. Psychiatric/Behavioral:  Negative for sleep disturbance.         Objective:     Vitals:  /82 (Site: Left Upper Arm, Position: Sitting)   Temp 97.3 °F (36.3 °C)   Ht 5' 4\" (1.626 m)   Wt 175 lb (79.4 kg) BMI 30.04 kg/m² Pain Score:   3      Physical Exam    General Appearance: NAD and Conversant. Eyes: EOM intact. HENT: Atraumatic. Neck: Neck is supple and Trachea midline. Lymph: No supraclavicular lymphadenopathy. Lungs: Normal respiratory effort and No significant respiratory distress. Cardiovascular: No lower extremity ulcerations or cyanosis and Capillary refill is less than 2 seconds. Abdomen: Soft and Non tender to palpation. Extremeties: No significant lower exremity edema. Skin: Visualized skin is intact and she has normal skin turgor. Psych: Mood and affect within normal limits, Insight and judgement within normal limits, and Alert and oriented. Inspection of the spine reveals no gross abnormalities in terms of curvature. ROM of the spine is within normal limits. Palpation: she has minimal TTP over the  cervical and lumbar  paraspinal musculature. Muscle Tone is within normal limits in all four extremities. Strength: 5 in right upper extremity extremity. 5 in left upper extremity extremity. 5 in right lower extremity extremity. 5 in left lower extremity extremity. Neurologic:  Coordination is within normal limits. DTR's are intact and symmetric throughout. Sensation is within normal limits throughout in all four extremities to deep pressure and light touch. Gait is within normal limits. No difficulty with heel walking, toe walking and tandem walking. Long Tract Signs: SLR: Negative. Spurling's Maneuver: Negative. Plantar Response: Downgoing bilaterally. Low sign is negative bilaterally    Positive Tinel on the right wrist with reproduction of her symptoms into the hand. DATA REVIEW:   MRI LUMBAR SPINE 8/23/2022:  FINDINGS:        The spine is visualized from the T11-12 through the S4 levels on the sagittal sequences.        The visualized spinal cord and conus medullaris are normal       Mild degenerative anterolisthesis of L4 over L5 and retrolisthesis of L5 over S1 of approximately 2 to 3 mm appears unchanged. The remaining alignment, vertebral body heights, bone marrow signal and paraspinous soft tissues are unremarkable. The T11-12 levels unremarkable. At the T12-L1 level, a small central to left parasagittal broad-based disc protrusion is present, without significant central spinal stenosis, lateral recess or neural foraminal narrowing. The L1-2 levels are unremarkable. At the L2-3 level, there is a very small right subarticular disc protrusion that posteriorly displaces the right L3 nerve root, and mild hypertrophic facet changes, without significant central spinal stenosis or neural foraminal narrowing. At the L3-4 level, there is mild diffuse disc bulging and mild hypertrophic facet changes, without central spinal stenosis or foraminal narrowing. At the L4-5 level, there is moderate diffuse disc bulging and hypertrophic facet and ligamentum flavum changes, which results in mild central spinal stenosis and neural foraminal narrowing. At the L5-S1 level, there is mild to moderate diffuse disc bulging with posterolateral endplate osteophytosis and mild to moderate hypertrophic facet and ligamentum flavum changes, which results in mild to moderate right neural foraminal narrowing. Impression       MULTILEVEL MILD DEGENERATIVE DISEASE AND LUMBAR SPONDYLOSIS, AS DESCRIBED IN DETAIL. MRI CERVICAL SPINE 8/23/2022:   CERVICAL:        Counting reference:  Craniocervical junction. Anatomic Variants:  None. Alignment:   Straightening of the cervical lordosis. Craniocervical junction:    Craniocervical junction is normal.       Cord: The cervical spinal cord is within normal limits of signal intensity and morphology. Bone marrow signal/fracture:    No evidence for acute or chronic fracture. No pathologic marrow infiltration. Endplate degenerative signal at C6-C7.        Cervical soft tissues: The paraspinal soft tissues are within normal limits. C2-C3: Disc bulge. Endplate osteophytes. Mild right foraminal narrowing without significant canal or left foraminal narrowing. C3-C4: Disc bulge. Endplate osteophytes. Mild to moderate right foraminal narrowing and mild left foraminal narrowing without significant canal narrowing. C4-C5: Facet/uncovertebral degenerative changes without significant canal or foraminal narrowing. C5-C6: Broad-based disc bulge. Endplate osteophytes. Facet/uncovertebral degenerative changes. Moderate bilateral foraminal narrowing without significant canal narrowing. C6-C7: Disc bulge. Endplate osteophytes. Facet/uncovertebral degenerative changes. Mild to moderate bilateral foraminal narrowing without significant canal narrowing. C7-T1: No significant canal or foraminal narrowing. Upper thoracic spine:  Visualized upper thoracic canal and foramina are without significant narrowing. Impression       Multilevel degenerative changes cervical spine. No high-grade canal narrowing. Multilevel foraminal narrowing. XRAY LUMBAR/CERVICAL/RIGHT SHOULDER 8/10/2022:   EXAMINATION: 5 VIEWS LUMBOSACRAL SPINE       DATE AND TIME:8/10/2022 10:07 AM       CLINICAL HISTORY: Severe lower back pain  M54.50 Chronic low back pain, unspecified back pain laterality, unspecified whether sciatica present ICD10        COMPARISON: None available. FINDINGS: Vertebral body heights and disc spaces are well-maintained. No acute fracture. Mild facet arthropathy in the lower lumbar spine bilaterally. SI joints symmetric and normal in appearance. Impression   MILD DEGENERATIVE CHANGE.               EXAMINATION: 3 VIEWS OF THE RIGHT SHOULDER       DATE AND TIME:8/10/2022 10:07 AM       CLINICAL HISTORY: Acute right shoulder pain M54.50 Chronic low back pain, unspecified back pain laterality, unspecified whether sciatica present ICD10 COMPARISON: None available. FINDINGS: There is no acute fracture or dislocation. There are mild degenerative changes at the acromioclavicular joint. IMPRESSION:  NO ACUTE OSSEOUS ABNORMALITY OF THE RIGHT SHOULDER. EXAMINATION: XR CERVICAL SPINE (4-5 VIEWS)        DATE AND TIME:8/10/2022 10:07 AM       CLINICAL HISTORY: Severe posterior neck pain M54.50 Chronic low back pain, unspecified back pain laterality, unspecified whether sciatica present ICD10        COMPARISON:None        FINDINGS:No prevertebral soft tissue swelling is seen. Cervical alignment is maintained. No acute fracture is identified. There is some disc space narrowing and facet arthropathy consistent with  degenerative change. Uncovertebral prominence bilaterally    at C5-6 with probable mild foraminal narrowing. The odontoid process is intact. IMPRESSION:MILD CERVICAL SPONDYLOSIS WITHOUT ACUTE  ABNORMALITY. Assessment:      Diagnosis Orders   1. Cervical spondylosis without myelopathy  DC INJ DX/THER AGNT PARAVERT FACET JOINT, CERV/THORAC, 1ST LEVEL    DC INJ DX/THER AGNT PARAVERT FACET JOINT, CERV/THORAC, 2ND LEVEL      2. Carpal tunnel syndrome of right wrist  DC INJECT CARPAL TUNNEL      3. Arthropathy  Ambulatory referral to Rheumatology      4. DDD (degenerative disc disease), lumbar            Plan:          No orders of the defined types were placed in this encounter.       Orders Placed This Encounter   Procedures    Ambulatory referral to Rheumatology     Referral Priority:   Routine     Referral Type:   Consult for Advice and Opinion     Requested Specialty:   Rheumatology     Number of Visits Requested:   1    DC INJ DX/THER AGNT PARAVERT FACET JOINT, CERV/THORAC, 1ST LEVEL     BILATERAL C5,6,7 MBB     Standing Status:   Future     Standing Expiration Date:   12/26/2022    DC INJ DX/THER AGNT PARAVERT FACET JOINT, CERV/THORAC, 2ND LEVEL     BILATERAL C5,6,7 MBB     Standing Status:   Future Standing Expiration Date:   12/26/2022    IN INJECT CARPAL TUNNEL     RIGHT CARPAL TUNNEL     Standing Status:   Future     Standing Expiration Date:   12/26/2022     Trial of Right Carpal tunnel injection. EMG results from Dr Calvin Palomo test reviewed. Discussed the risks including but not limited to bleeding, infection, worsened pain, damage to surrounding structures, side effects, toxicity, allergic reactions to medications used, need for surgery, premature damage or degeneration of the joint, as well as catastrophic injury such as vision loss, paralysis, stroke, bowel or bladder incontinence, ventilator dependence, loss of use of the joint and/or extremity, and death. Discussed the risks, benefits, and alternatives to the procedure including no procedure at all. Discussed that we cannot undo any permanent neurologic or orthopaedic damage or change the course of any underlying disease. After thorough discussion, patient expressed understanding and willingness to proceed. For the neck pain, Trial of Medial Branch Blocks (BILATERAL C5,6,7 MBB) as ordered above. Pertinent imaging reviewed. She has failed conservative treatment (PT, Facet Injections, Medication, HEP). Axial symptoms are predominant. Discussed the risks including but not limited to bleeding, infection, worsened pain, damage to surrounding structures, side effects, toxicity, allergic reactions to medications used, need for surgery, premature damage or degeneration of the joint, as well as catastrophic injury such as vision loss, paralysis, stroke, bowel or bladder incontinence, ventilator dependence, loss of use of the joint and/or extremity, and death. Discussed the risks, benefits, and alternatives to the procedure including no procedure at all. Discussed that we cannot undo any permanent neurologic or orthopaedic damage or change the course of any underlying disease.  After thorough discussion, patient expressed understanding and willingness to proceed. For the lumbar spine, continue to monitor for radicular recurrence of symptoms. For the transitory peripheral pain, she would benefit from a Rheumatologic evaluation. But her symptoms, may be Fibromyalgia related. Follow up:  Return for Medial Banch Blocks, Carpal Tunnel Injection , Follow up with NP in 2 month. The patient will follow up for reevaluation of her pain. The patient is aware of the treatment plan and in agreement. All of her questions were answered.      Debbie Avila MD

## 2022-09-28 ENCOUNTER — TELEPHONE (OUTPATIENT)
Dept: PAIN MANAGEMENT | Age: 62
End: 2022-09-28

## 2022-09-28 NOTE — TELEPHONE ENCOUNTER
RIGHT CARPAL TUNNEL INJECTION    NO AUTH REQUIRED    OK to schedule procedure approved as above. Please note sides/levels approved and date range. (If applicable, sides/levels approved may differ from those ordered)    TO BE SCHEDULED WITH DR. MORENO

## 2022-09-28 NOTE — TELEPHONE ENCOUNTER
KRISTY C5,6,7 MBB    NO AUTH REQUIRED    OK to schedule procedure approved as above. Please note sides/levels approved and date range. (If applicable, sides/levels approved may differ from those ordered)    TO BE SCHEDULED WITH DR. MORENO

## 2022-10-19 ENCOUNTER — TELEPHONE (OUTPATIENT)
Dept: PAIN MANAGEMENT | Age: 62
End: 2022-10-19

## 2022-10-19 NOTE — TELEPHONE ENCOUNTER
BENEFITS:RIGHT CARPAL TUNNEL INJECTION      Insurance: DIVYA  Phone: [phone]  Contact Name: DIVYA  Effective Date: 5.5.2022     Plan year: YES-CALENDAR  Deductible: 8000.00      Deductible Met: 2519.60  Allowed/benefits paid at: 90% AFTER DEDUCTIBLE  OOP: 7850.00 MET $2353.25  Freq Limits: 20526  Prior Auth Requirement: NO AUTH REQUIRED    Notes: NO PRE-EX CLAUSE    Call Reference #: [id number]    Time of call: 10:30am

## 2022-11-09 ENCOUNTER — PROCEDURE VISIT (OUTPATIENT)
Dept: PAIN MANAGEMENT | Age: 62
End: 2022-11-09
Payer: COMMERCIAL

## 2022-11-09 DIAGNOSIS — M47.812 CERVICAL SPONDYLOSIS WITHOUT MYELOPATHY: ICD-10-CM

## 2022-11-09 PROCEDURE — 64491 INJ PARAVERT F JNT C/T 2 LEV: CPT | Performed by: PAIN MEDICINE

## 2022-11-09 PROCEDURE — 64490 INJ PARAVERT F JNT C/T 1 LEV: CPT | Performed by: PAIN MEDICINE

## 2022-11-09 RX ORDER — DEXAMETHASONE SODIUM PHOSPHATE 10 MG/ML
10 INJECTION, SOLUTION INTRAMUSCULAR; INTRAVENOUS ONCE
Status: COMPLETED | OUTPATIENT
Start: 2022-11-09 | End: 2022-11-09

## 2022-11-09 RX ORDER — LIDOCAINE HYDROCHLORIDE 10 MG/ML
2 INJECTION, SOLUTION EPIDURAL; INFILTRATION; INTRACAUDAL; PERINEURAL ONCE
Status: COMPLETED | OUTPATIENT
Start: 2022-11-09 | End: 2022-11-09

## 2022-11-09 RX ADMIN — LIDOCAINE HYDROCHLORIDE 2 ML: 10 INJECTION, SOLUTION EPIDURAL; INFILTRATION; INTRACAUDAL; PERINEURAL at 10:32

## 2022-11-09 RX ADMIN — DEXAMETHASONE SODIUM PHOSPHATE 10 MG: 10 INJECTION, SOLUTION INTRAMUSCULAR; INTRAVENOUS at 10:17

## 2022-11-09 NOTE — PROGRESS NOTES
[facility]) Physicians  Neurosurgery and Pain [facility]  [address], [address] [facility] , [address], [place]: [phone]  F: [phone]        CERVICAL MEDIAL BRANCH BLOCK    Provider: Tracie Edwards MD          Patient Name: Larisa Dobson : 1960        Date: 2022         Larisa Dobson is here today for interventional pain management. Discussed the risks including but not limited to bleeding, infection, worsened pain, damage to surrounding structures, side effects, toxicity, allergic reactions to medications used, need for surgery, pneumothorax, abdominal and visceral injury, as well as catastrophic injury such as vision loss, paralysis, spinal cord or plexus injury, stroke, bowel or bladder incontinence, chest tube insertion, ventilator dependence, and death. Discussed the risks, benefits, and alternatives to the procedure including no procedure at all. Discussed that we cannot undo any permanent neurologic or orthopaedic damage or change the course of any underlying disease. After thorough discussion, patient expressed understanding and willingness to proceed. Written consent was obtained and is in the chart. Verbal consent to proceed was obtained. Sterile technique was used. Fluoroscopic guidance was used. Patient positioned in prone position. Area was cleaned with Betadine. Informed consent was obtained. Appropriate length spinal needle was advanced to the anatomic location of the medial branch at the lateral mass utilizing intermittent fluoroscopy. Approximately 10 mg Dexamethasone and 2 cc of 1% PF Lidocaine was divided equally and injected at each anatomic level without difficulty. Patient tolerated the procedure well, no obvious complications occurred during the procedure. Patient was appropriately monitored and discharged home in stable condition with their usual motor strength.  The patient will keep close track of pain over the next several hours and call our office tomorrow and let us know what percentage of pain relief is experienced. Post op Instructions were given to patient. If on blood thiners patient was told to resume them post-procedure. Relevant and recent imaging reviewed with patient today.              [x] Bilateral [] C2 [] C3      [] C4 [x] C5     [] Right [x] C6 [x] C7            [] Left                                      Rochelle Warner MD

## 2022-11-15 ENCOUNTER — TELEPHONE (OUTPATIENT)
Dept: PAIN MANAGEMENT | Age: 62
End: 2022-11-15

## 2022-11-15 DIAGNOSIS — M47.812 CERVICAL SPONDYLOSIS WITHOUT MYELOPATHY: Primary | ICD-10-CM

## 2022-11-15 RX ORDER — TIZANIDINE 4 MG/1
4 TABLET ORAL 2 TIMES DAILY PRN
Qty: 60 TABLET | Refills: 2 | Status: SHIPPED | OUTPATIENT
Start: 2022-11-15

## 2022-11-15 NOTE — TELEPHONE ENCOUNTER
Patient received cervical MBB on 11/9/22 and pt states since then her pain has increased. Pt states her neck, shoulders, and even face feel extremely \"tight\" and painful. Pt asks if Dr. Mickey Levin could prescribed tizanidine 4mg? Pt states Dr. Hodges Sandhoff has prescribed in past and it has helped.

## 2022-11-28 ENCOUNTER — OFFICE VISIT (OUTPATIENT)
Dept: FAMILY MEDICINE CLINIC | Age: 62
End: 2022-11-28
Payer: COMMERCIAL

## 2022-11-28 VITALS
RESPIRATION RATE: 16 BRPM | SYSTOLIC BLOOD PRESSURE: 110 MMHG | TEMPERATURE: 97.2 F | BODY MASS INDEX: 31.79 KG/M2 | HEIGHT: 64 IN | DIASTOLIC BLOOD PRESSURE: 82 MMHG | HEART RATE: 75 BPM | WEIGHT: 186.2 LBS | OXYGEN SATURATION: 94 %

## 2022-11-28 DIAGNOSIS — J06.9 URI WITH COUGH AND CONGESTION: Primary | ICD-10-CM

## 2022-11-28 DIAGNOSIS — R50.9 FEVER, UNSPECIFIED FEVER CAUSE: ICD-10-CM

## 2022-11-28 DIAGNOSIS — R06.2 WHEEZING: ICD-10-CM

## 2022-11-28 LAB
INFLUENZA A ANTIBODY: NEGATIVE
INFLUENZA B ANTIBODY: NEGATIVE

## 2022-11-28 PROCEDURE — 87804 INFLUENZA ASSAY W/OPTIC: CPT | Performed by: NURSE PRACTITIONER

## 2022-11-28 PROCEDURE — 99213 OFFICE O/P EST LOW 20 MIN: CPT | Performed by: NURSE PRACTITIONER

## 2022-11-28 RX ORDER — METHYLPREDNISOLONE 4 MG/1
TABLET ORAL
Qty: 1 KIT | Refills: 0 | Status: SHIPPED | OUTPATIENT
Start: 2022-11-28 | End: 2022-12-04

## 2022-11-28 RX ORDER — AZITHROMYCIN 250 MG/1
250 TABLET, FILM COATED ORAL SEE ADMIN INSTRUCTIONS
Qty: 6 TABLET | Refills: 0 | Status: SHIPPED | OUTPATIENT
Start: 2022-11-28 | End: 2022-12-03

## 2022-11-28 RX ORDER — DEXTROMETHORPHAN HYDROBROMIDE AND PROMETHAZINE HYDROCHLORIDE 15; 6.25 MG/5ML; MG/5ML
5 SYRUP ORAL 4 TIMES DAILY PRN
Qty: 118 ML | Refills: 0 | Status: SHIPPED | OUTPATIENT
Start: 2022-11-28

## 2022-11-28 ASSESSMENT — ENCOUNTER SYMPTOMS
DIARRHEA: 0
NAUSEA: 0
SORE THROAT: 0
SINUS PAIN: 1
COUGH: 1
SHORTNESS OF BREATH: 0
WHEEZING: 1
VOMITING: 0
RHINORRHEA: 0

## 2022-11-28 NOTE — PROGRESS NOTES
Subjective:      Patient ID: Julio Cesar Joseph is a 65 y.o. female who presents today for:  Chief Complaint   Patient presents with    Cough     Cough, sore throat, loss of voice wheezing in chest, onset  covid test negative        Cough  Associated symptoms include chills, a fever, headaches, myalgias and wheezing. Pertinent negatives include no rash, rhinorrhea, sore throat or shortness of breath.        Started thanksgiving night   Today will be the fifth day   Felt like very tired  Throat felt weird  Almost like a hard time breathing   Started coughing   Losing her voice   Doesn't feel really sore unless she keeps coughing   Feels like in the upper chest   Bringing up phlegm   Taking mucinex DM syrup  Doesn't help the coughing but breaks it up   Yesterday 100.3 most of the day   She did covid test at home  Did one Friday and sat  They were both neg   The biggest issue still occurring is the coughing   She does not have asthma       Exam good  Voice change   Past Medical History:   Diagnosis Date    Colon polyposis 2013    multiple on colonoscopy, do [id number]    Depression     Esophageal web 2013    Fibromyalgia 2011    GERD (gastroesophageal reflux disease)     Hyperlipidemia     Hypothyroidism     Macular degeneration     left eye    Normal nuclear stress test 3/21/13     Past Surgical History:   Procedure Laterality Date     SECTION      CHOLECYSTECTOMY  13    Lapchole with gram    COLONOSCOPY  2013    HERNIA REPAIR      HYSTERECTOMY (CERVIX STATUS UNKNOWN)      still with one ovary    UPPER GASTROINTESTINAL ENDOSCOPY  2013     BREAST FINE NEEDLE ASPIRATION Bilateral      Social History     Socioeconomic History    Marital status:      Spouse name: Not on file    Number of children: Not on file    Years of education: Not on file    Highest education level: Not on file   Occupational History    Occupation:      Employer: [employer]   Tobacco Use    Smoking status: Former     Packs/day: 0.25     Years: 35.00     Pack years: 8.75     Types: Cigarettes     Start date: 8/12/2013     Quit date: 4/1/2019     Years since quitting: 3.6    Smokeless tobacco: Never   Vaping Use    Vaping Use: Former    Substances: Always   Substance and Sexual Activity    Alcohol use: No     Alcohol/week: 0.0 standard drinks    Drug use: Not Currently    Sexual activity: Yes     Partners: Male   Other Topics Concern    Not on file   Social History Narrative    Not on file     Social Determinants of Health     Financial Resource Strain: Low Risk     Difficulty of Paying Living Expenses: Not hard at all   Food Insecurity: No Food Insecurity    Worried About Running Out of Food in the Last Year: Never true    Ran Out of Food in the Last Year: Never true   Transportation Needs: Not on file   Physical Activity: Not on file   Stress: Not on file   Social Connections: Not on file   Intimate Partner Violence: Not on file   Housing Stability: Not on file     Family History   Problem Relation Age of Onset    Heart Disease Mother         COPD, she has cervical cancer as well. Stroke Mother         No other fdr with cancer, no aneurysm or heart problems. Diabetes Mother     Cancer Father         STOMACH, committed suicide    Other Sister         low thyroid     Other Brother         ?stroke, not verified. Breast Cancer Maternal Aunt      No Known Allergies  Current Outpatient Medications   Medication Sig Dispense Refill    promethazine-dextromethorphan (PROMETHAZINE-DM) 6.25-15 MG/5ML syrup Take 5 mLs by mouth 4 times daily as needed for Cough 118 mL 0    azithromycin (ZITHROMAX) 250 MG tablet Take 1 tablet by mouth See Admin Instructions for 5 days 500mg on day 1 followed by 250mg on days 2 - 5 6 tablet 0    methylPREDNISolone (MEDROL DOSEPACK) 4 MG tablet Take by mouth.  1 kit 0    tiZANidine (ZANAFLEX) 4 MG tablet Take 1 tablet by mouth 2 times daily as needed (spasm) 60 tablet 2 levothyroxine (SYNTHROID) 75 MCG tablet TAKE 1 TABLET BY MOUTH DAILY 90 tablet 3    escitalopram (LEXAPRO) 20 MG tablet Take 1 tablet by mouth daily 90 tablet 3    rosuvastatin (CRESTOR) 40 MG tablet Take 1 tablet by mouth daily 90 tablet 3    omeprazole (PRILOSEC) 40 MG delayed release capsule Take 1 capsule by mouth daily 90 capsule 3    Cholecalciferol (VITAMIN D3) 125 MCG (5000 UT) TABS Take by mouth Take one tablet po once daily four days out of the week      vitamin B-12 (CYANOCOBALAMIN) 100 MCG tablet Take 50 mcg by mouth daily      polyethylene glycol (MIRALAX) 17 GM/SCOOP powder Take 17 g by mouth daily 1 Bottle 1     No current facility-administered medications for this visit. Review of Systems   Constitutional:  Positive for appetite change, chills, diaphoresis, fatigue and fever. HENT:  Positive for congestion and sinus pain. Negative for rhinorrhea and sore throat. Respiratory:  Positive for cough and wheezing. Negative for shortness of breath. Gastrointestinal:  Negative for diarrhea, nausea and vomiting. Musculoskeletal:  Positive for myalgias. Skin:  Negative for rash. Neurological:  Positive for headaches. Negative for dizziness and light-headedness. Psychiatric/Behavioral:  Negative for agitation, confusion and hallucinations. Objective:   /82 (Site: Left Upper Arm, Position: Sitting, Cuff Size: Large Adult)   Pulse 75   Temp 97.2 °F (36.2 °C) (Temporal)   Resp 16   Ht 5' 4\" (1.626 m)   Wt 186 lb 3.2 oz (84.5 kg)   SpO2 94%   BMI 31.96 kg/m²     Physical Exam  Vitals reviewed. Constitutional:       Appearance: Normal appearance. HENT:      Head: Normocephalic and atraumatic. Comments: Voice is raspy      Right Ear: Hearing, tympanic membrane, ear canal and external ear normal. No middle ear effusion. No foreign body. Tympanic membrane is not injected, erythematous or bulging.       Left Ear: Hearing, tympanic membrane, ear canal and external ear normal.  No middle ear effusion. No foreign body. Tympanic membrane is not injected, erythematous or bulging. Nose: Nose normal. No congestion or rhinorrhea. Right Nostril: No foreign body. Left Nostril: No foreign body. Right Turbinates: Not enlarged. Left Turbinates: Not enlarged. Right Sinus: No maxillary sinus tenderness or frontal sinus tenderness. Left Sinus: No maxillary sinus tenderness or frontal sinus tenderness. Mouth/Throat:      Lips: Pink. Mouth: Mucous membranes are moist.      Pharynx: Oropharynx is clear. Uvula midline. No pharyngeal swelling, oropharyngeal exudate, posterior oropharyngeal erythema or uvula swelling. Tonsils: No tonsillar exudate or tonsillar abscesses. Eyes:      General: Lids are normal.         Right eye: No foreign body. Left eye: No foreign body. Extraocular Movements: Extraocular movements intact. Conjunctiva/sclera: Conjunctivae normal.   Cardiovascular:      Rate and Rhythm: Normal rate and regular rhythm. Heart sounds: Normal heart sounds. Pulmonary:      Effort: Pulmonary effort is normal. No tachypnea, accessory muscle usage or respiratory distress. Breath sounds: Normal breath sounds. No wheezing or rhonchi. Abdominal:      Tenderness: There is no abdominal tenderness. There is no guarding. Musculoskeletal:         General: Normal range of motion. Cervical back: Normal range of motion. Lymphadenopathy:      Cervical: No cervical adenopathy. Upper Body:      Right upper body: No supraclavicular adenopathy. Left upper body: No supraclavicular adenopathy. Skin:     General: Skin is warm and dry. Capillary Refill: Capillary refill takes less than 2 seconds. Neurological:      General: No focal deficit present. Mental Status: She is alert and oriented to person, place, and time. Gait: Gait is intact.    Psychiatric:         Mood and Affect: Mood normal. Speech: Speech normal.         Behavior: Behavior normal. Behavior is cooperative. Thought Content: Thought content normal.         Judgment: Judgment normal.       Assessment:       Diagnosis Orders   1. URI with cough and congestion  promethazine-dextromethorphan (PROMETHAZINE-DM) 6.25-15 MG/5ML syrup    azithromycin (ZITHROMAX) 250 MG tablet    methylPREDNISolone (MEDROL DOSEPACK) 4 MG tablet    POCT Influenza A/B      2. Wheezing  methylPREDNISolone (MEDROL DOSEPACK) 4 MG tablet      3. Fever, unspecified fever cause  POCT Influenza A/B        No results found for this visit on 11/28/22. Plan:     Assessment & Plan   Yumi Mccallum was seen today for cough. Diagnoses and all orders for this visit:    URI with cough and congestion  -     promethazine-dextromethorphan (PROMETHAZINE-DM) 6.25-15 MG/5ML syrup; Take 5 mLs by mouth 4 times daily as needed for Cough  -     azithromycin (ZITHROMAX) 250 MG tablet; Take 1 tablet by mouth See Admin Instructions for 5 days 500mg on day 1 followed by 250mg on days 2 - 5  -     methylPREDNISolone (MEDROL DOSEPACK) 4 MG tablet; Take by mouth.  -     POCT Influenza A/B    Wheezing  -     methylPREDNISolone (MEDROL DOSEPACK) 4 MG tablet; Take by mouth. Fever, unspecified fever cause  -     POCT Influenza A/B    Orders Placed This Encounter   Procedures    POCT Influenza A/B       Orders Placed This Encounter   Medications    promethazine-dextromethorphan (PROMETHAZINE-DM) 6.25-15 MG/5ML syrup     Sig: Take 5 mLs by mouth 4 times daily as needed for Cough     Dispense:  118 mL     Refill:  0    azithromycin (ZITHROMAX) 250 MG tablet     Sig: Take 1 tablet by mouth See Admin Instructions for 5 days 500mg on day 1 followed by 250mg on days 2 - 5     Dispense:  6 tablet     Refill:  0    methylPREDNISolone (MEDROL DOSEPACK) 4 MG tablet     Sig: Take by mouth. Dispense:  1 kit     Refill:  0       There are no discontinued medications.   Return if symptoms worsen or fail to improve. Reviewed with the patient/family: current clinical status & medications. Side effects of the medication prescribed today, as well as treatment plan/rationale and result expectations have been discussed with the patient/family who expresses understanding. Patient will be discharged home in stable condition. Follow up with PCP to evaluate treatment results or return if symptoms worsen or fail to improve. Discussed signs and symptoms which require immediate follow-up in ED/call to 911. Understanding verbalized. I have reviewed the patient's medical history in detail and updated the computerized patient record.     Geovanni Alvarado, APRN - CNP

## 2022-12-12 SDOH — HEALTH STABILITY: PHYSICAL HEALTH: ON AVERAGE, HOW MANY MINUTES DO YOU ENGAGE IN EXERCISE AT THIS LEVEL?: 0 MIN

## 2022-12-12 SDOH — HEALTH STABILITY: PHYSICAL HEALTH: ON AVERAGE, HOW MANY DAYS PER WEEK DO YOU ENGAGE IN MODERATE TO STRENUOUS EXERCISE (LIKE A BRISK WALK)?: 0 DAYS

## 2022-12-12 ASSESSMENT — SOCIAL DETERMINANTS OF HEALTH (SDOH)

## 2022-12-13 ENCOUNTER — OFFICE VISIT (OUTPATIENT)
Dept: FAMILY MEDICINE CLINIC | Age: 62
End: 2022-12-13
Payer: COMMERCIAL

## 2022-12-13 VITALS
SYSTOLIC BLOOD PRESSURE: 110 MMHG | OXYGEN SATURATION: 97 % | HEIGHT: 64 IN | BODY MASS INDEX: 31.92 KG/M2 | WEIGHT: 187 LBS | HEART RATE: 71 BPM | DIASTOLIC BLOOD PRESSURE: 60 MMHG

## 2022-12-13 DIAGNOSIS — E03.9 HYPOTHYROIDISM, UNSPECIFIED TYPE: ICD-10-CM

## 2022-12-13 DIAGNOSIS — E78.2 MIXED HYPERLIPIDEMIA: Primary | ICD-10-CM

## 2022-12-13 DIAGNOSIS — F41.9 ANXIETY: ICD-10-CM

## 2022-12-13 DIAGNOSIS — R73.9 HYPERGLYCEMIA: ICD-10-CM

## 2022-12-13 DIAGNOSIS — E55.9 VITAMIN D DEFICIENCY: ICD-10-CM

## 2022-12-13 PROCEDURE — 99214 OFFICE O/P EST MOD 30 MIN: CPT | Performed by: NURSE PRACTITIONER

## 2022-12-13 RX ORDER — ESCITALOPRAM OXALATE 20 MG/1
30 TABLET ORAL DAILY
Qty: 135 TABLET | Refills: 0 | Status: SHIPPED | OUTPATIENT
Start: 2022-12-13

## 2022-12-13 ASSESSMENT — ENCOUNTER SYMPTOMS
SHORTNESS OF BREATH: 0
SORE THROAT: 0
NAUSEA: 0
BLOOD IN STOOL: 0
CHEST TIGHTNESS: 0
ABDOMINAL PAIN: 0
CONSTIPATION: 0
WHEEZING: 0
EYES NEGATIVE: 1
TROUBLE SWALLOWING: 0
VOMITING: 0
COUGH: 0
DIARRHEA: 0

## 2022-12-13 NOTE — PROGRESS NOTES
Subjective:     Patient ID: Chanelle Aguiar is a 65 y.o. female who presentstoday for:  Chief Complaint   Patient presents with    New Patient     Patient present today to establish care          HPI  New patient establish care chronic disease management     Past Medical History:   Diagnosis Date    Colon polyposis 2013    multiple on colonoscopy, do [id number]    Depression     Esophageal web 2013    Fibromyalgia 2011    GERD (gastroesophageal reflux disease)     Hyperlipidemia     Hypothyroidism     Macular degeneration     left eye    Normal nuclear stress test 3/21/13     Current Outpatient Medications on File Prior to Visit   Medication Sig Dispense Refill    promethazine-dextromethorphan (PROMETHAZINE-DM) 6.25-15 MG/5ML syrup Take 5 mLs by mouth 4 times daily as needed for Cough 118 mL 0    tiZANidine (ZANAFLEX) 4 MG tablet Take 1 tablet by mouth 2 times daily as needed (spasm) 60 tablet 2    levothyroxine (SYNTHROID) 75 MCG tablet TAKE 1 TABLET BY MOUTH DAILY 90 tablet 3    rosuvastatin (CRESTOR) 40 MG tablet Take 1 tablet by mouth daily 90 tablet 3    omeprazole (PRILOSEC) 40 MG delayed release capsule Take 1 capsule by mouth daily 90 capsule 3    Cholecalciferol (VITAMIN D3) 125 MCG (5000 UT) TABS Take by mouth Take one tablet po once daily four days out of the week      vitamin B-12 (CYANOCOBALAMIN) 100 MCG tablet Take 50 mcg by mouth daily      polyethylene glycol (MIRALAX) 17 GM/SCOOP powder Take 17 g by mouth daily 1 Bottle 1     No current facility-administered medications on file prior to visit.      Past Surgical History:   Procedure Laterality Date     SECTION      CHOLECYSTECTOMY  13    Lapchole with gram    COLONOSCOPY  2013    HERNIA REPAIR      HYSTERECTOMY (CERVIX STATUS UNKNOWN)      still with one ovary    UPPER GASTROINTESTINAL ENDOSCOPY  2013     BREAST FINE NEEDLE ASPIRATION Bilateral         Family History   Problem Relation Age of Onset    Heart Disease Mother COPD, she has cervical cancer as well. Stroke Mother         No other fdr with cancer, no aneurysm or heart problems. Diabetes Mother     Cancer Father         STOMACH, committed suicide    Other Sister         low thyroid     Other Brother         ?stroke, not verified. Breast Cancer Maternal Aunt      Social History     Socioeconomic History    Marital status:      Spouse name: Not on file    Number of children: Not on file    Years of education: Not on file    Highest education level: Not on file   Occupational History    Occupation:      Employer: [employer]   Tobacco Use    Smoking status: Former     Packs/day: 0.25     Years: 35.00     Pack years: 8.75     Types: Cigarettes     Start date: 8/12/2013     Quit date: 4/1/2019     Years since quitting: 3.7    Smokeless tobacco: Never   Vaping Use    Vaping Use: Former    Substances: Always   Substance and Sexual Activity    Alcohol use: No     Alcohol/week: 0.0 standard drinks    Drug use: Not Currently    Sexual activity: Yes     Partners: Male   Other Topics Concern    Not on file   Social History Narrative    Not on file     Social Determinants of Health     Financial Resource Strain: Low Risk     Difficulty of Paying Living Expenses: Not hard at all   Food Insecurity: No Food Insecurity    Worried About Running Out of Food in the Last Year: Never true    Ran Out of Food in the Last Year: Never true   Transportation Needs: Not on file   Physical Activity: Inactive    Days of Exercise per Week: 0 days    Minutes of Exercise per Session: 0 min   Stress: Not on file   Social Connections: Not on file   Intimate Partner Violence: Not At Risk    Fear of Current or Ex-Partner: No    Emotionally Abused: No    Physically Abused: No    Sexually Abused: No   Housing Stability: Not on file     Allergies:  Patient has no known allergies. Review of Systems   Constitutional: Negative.   Negative for chills, diaphoresis, fatigue and fever.   HENT: Negative. Negative for congestion, sore throat and trouble swallowing. Eyes: Negative. Respiratory:  Negative for cough, chest tightness, shortness of breath and wheezing. Cardiovascular:  Negative for chest pain, palpitations and leg swelling. Gastrointestinal:  Negative for abdominal pain, blood in stool, constipation, diarrhea, nausea and vomiting. Endocrine: Negative. Genitourinary: Negative. Negative for difficulty urinating. Musculoskeletal:  Negative for arthralgias, gait problem, joint swelling and myalgias. Skin: Negative. Neurological:  Negative for dizziness, syncope, speech difficulty, weakness, light-headedness and headaches. Psychiatric/Behavioral:  Positive for agitation. Negative for dysphoric mood, self-injury, sleep disturbance and suicidal ideas. The patient is not nervous/anxious. Objective:    /60   Pulse 71   Ht 5' 4\" (1.626 m)   Wt 187 lb (84.8 kg)   SpO2 97%   BMI 32.10 kg/m²     Physical Exam  Constitutional:       General: She is not in acute distress. Appearance: She is well-developed. She is not diaphoretic. HENT:      Head: Normocephalic and atraumatic. Right Ear: External ear normal.      Left Ear: External ear normal.      Mouth/Throat:      Mouth: Mucous membranes are moist.   Eyes:      Extraocular Movements: Extraocular movements intact. Conjunctiva/sclera: Conjunctivae normal.      Pupils: Pupils are equal, round, and reactive to light. Cardiovascular:      Rate and Rhythm: Normal rate and regular rhythm. Heart sounds: Normal heart sounds. Pulmonary:      Effort: Pulmonary effort is normal. No respiratory distress. Breath sounds: Normal breath sounds. No wheezing. Abdominal:      General: Bowel sounds are normal.      Palpations: Abdomen is soft. Tenderness: There is no abdominal tenderness. Musculoskeletal:         General: Normal range of motion.       Cervical back: Normal range of motion and neck supple. No tenderness. Right lower leg: No edema. Left lower leg: No edema. Lymphadenopathy:      Cervical: No cervical adenopathy. Skin:     General: Skin is warm and dry. Findings: No rash. Neurological:      General: No focal deficit present. Mental Status: She is alert and oriented to person, place, and time. Psychiatric:         Mood and Affect: Mood normal.         Behavior: Behavior normal.         Thought Content: Thought content normal.         Judgment: Judgment normal.       Assessment & Plan:       Diagnosis Orders   1. Mixed hyperlipidemia  CBC with Auto Differential    Comprehensive Metabolic Panel    Lipid Panel      2. Hypothyroidism, unspecified type  TSH    T4, Free      3. Vitamin D deficiency  Vitamin D 25 Hydroxy      4. Hyperglycemia  Hemoglobin A1C      5.  Anxiety  escitalopram (LEXAPRO) 20 MG tablet        Orders Placed This Encounter   Procedures    CBC with Auto Differential     Standing Status:   Future     Standing Expiration Date:   12/13/2023    Comprehensive Metabolic Panel     Standing Status:   Future     Standing Expiration Date:   12/13/2023    Lipid Panel     Standing Status:   Future     Standing Expiration Date:   12/13/2023     Order Specific Question:   Is Patient Fasting?/# of Hours     Answer:   8    TSH     Standing Status:   Future     Standing Expiration Date:   12/13/2023    T4, Free     Standing Status:   Future     Standing Expiration Date:   12/13/2023    Vitamin D 25 Hydroxy     Standing Status:   Future     Standing Expiration Date:   12/13/2023    Hemoglobin A1C     Standing Status:   Future     Standing Expiration Date:   12/13/2023       Orders Placed This Encounter   Medications    escitalopram (LEXAPRO) 20 MG tablet     Sig: Take 1.5 tablets by mouth daily     Dispense:  135 tablet     Refill:  0       Medications Discontinued During This Encounter   Medication Reason    escitalopram (LEXAPRO) 20 MG tablet LIST CLEANUP     Return in about 3 months (around 3/13/2023). Reviewed with the patient: currentclinical status, medications, activities and diet. Side effects, adverse effects of the medicationprescribed today, as well as treatment plan/ rationale and result expectations havebeen discussed with the patient who expresses understanding and desires to proceed. Pt instructions reviewed and given to patient. Close follow up to evaluate treatment resultsand for coordination of care. I have reviewed the patient's medical historyin detail and updated the computerized patient record.     Tal Patton, ROSIBEL - CNP

## 2022-12-22 DIAGNOSIS — K21.9 GASTROESOPHAGEAL REFLUX DISEASE WITHOUT ESOPHAGITIS: ICD-10-CM

## 2022-12-22 RX ORDER — ROSUVASTATIN CALCIUM 40 MG/1
40 TABLET, COATED ORAL DAILY
Qty: 90 TABLET | Refills: 3 | Status: SHIPPED | OUTPATIENT
Start: 2022-12-22

## 2022-12-22 RX ORDER — OMEPRAZOLE 40 MG/1
40 CAPSULE, DELAYED RELEASE ORAL DAILY
Qty: 90 CAPSULE | Refills: 3 | Status: SHIPPED | OUTPATIENT
Start: 2022-12-22

## 2023-02-14 DIAGNOSIS — Z12.31 ENCOUNTER FOR SCREENING MAMMOGRAM FOR MALIGNANT NEOPLASM OF BREAST: Primary | ICD-10-CM

## 2023-02-27 ENCOUNTER — HOSPITAL ENCOUNTER (OUTPATIENT)
Dept: WOMENS IMAGING | Age: 63
Discharge: HOME | End: 2023-03-01
Payer: COMMERCIAL

## 2023-02-27 DIAGNOSIS — E03.9 HYPOTHYROIDISM, UNSPECIFIED TYPE: ICD-10-CM

## 2023-02-27 DIAGNOSIS — R73.9 HYPERGLYCEMIA: ICD-10-CM

## 2023-02-27 DIAGNOSIS — E78.2 MIXED HYPERLIPIDEMIA: ICD-10-CM

## 2023-02-27 DIAGNOSIS — R17 ELEVATED BILIRUBIN: ICD-10-CM

## 2023-02-27 DIAGNOSIS — Z12.31 ENCOUNTER FOR SCREENING MAMMOGRAM FOR MALIGNANT NEOPLASM OF BREAST: ICD-10-CM

## 2023-02-27 DIAGNOSIS — E55.9 VITAMIN D DEFICIENCY: ICD-10-CM

## 2023-02-27 LAB
ALBUMIN SERPL-MCNC: 4.2 G/DL (ref 3.5–4.6)
ALP BLD-CCNC: 94 U/L (ref 40–130)
ALT SERPL-CCNC: 15 U/L (ref 0–33)
ANION GAP SERPL CALCULATED.3IONS-SCNC: 14 MEQ/L (ref 9–15)
AST SERPL-CCNC: 16 U/L (ref 0–35)
BASOPHILS ABSOLUTE: 0 K/UL (ref 0–0.2)
BASOPHILS RELATIVE PERCENT: 0.9 %
BILIRUB SERPL-MCNC: 0.7 MG/DL (ref 0.2–0.7)
BILIRUB SERPL-MCNC: 0.7 MG/DL (ref 0.2–0.7)
BILIRUBIN DIRECT: <0.2 MG/DL (ref 0–0.4)
BILIRUBIN, INDIRECT: NORMAL MG/DL (ref 0–0.6)
BUN BLDV-MCNC: 13 MG/DL (ref 8–23)
CALCIUM SERPL-MCNC: 9 MG/DL (ref 8.5–9.9)
CHLORIDE BLD-SCNC: 104 MEQ/L (ref 95–107)
CHOLESTEROL, TOTAL: 194 MG/DL (ref 0–199)
CO2: 23 MEQ/L (ref 20–31)
CREAT SERPL-MCNC: 0.89 MG/DL (ref 0.5–0.9)
EOSINOPHILS ABSOLUTE: 0.1 K/UL (ref 0–0.7)
EOSINOPHILS RELATIVE PERCENT: 1.8 %
GFR SERPL CREATININE-BSD FRML MDRD: >60 ML/MIN/{1.73_M2}
GLOBULIN: 2.5 G/DL (ref 2.3–3.5)
GLUCOSE BLD-MCNC: 96 MG/DL (ref 70–99)
HBA1C MFR BLD: 6 % (ref 4.8–5.9)
HCT VFR BLD CALC: 38.6 % (ref 37–47)
HDLC SERPL-MCNC: 53 MG/DL (ref 40–59)
HEMOGLOBIN: 12.7 G/DL (ref 12–16)
LDL CHOLESTEROL CALCULATED: 112 MG/DL (ref 0–129)
LYMPHOCYTES ABSOLUTE: 1.6 K/UL (ref 1–4.8)
LYMPHOCYTES RELATIVE PERCENT: 32.3 %
MCH RBC QN AUTO: 29 PG (ref 27–31.3)
MCHC RBC AUTO-ENTMCNC: 33 % (ref 33–37)
MCV RBC AUTO: 87.9 FL (ref 79.4–94.8)
MONOCYTES ABSOLUTE: 0.3 K/UL (ref 0.2–0.8)
MONOCYTES RELATIVE PERCENT: 5.7 %
NEUTROPHILS ABSOLUTE: 2.9 K/UL (ref 1.4–6.5)
NEUTROPHILS RELATIVE PERCENT: 59.3 %
PDW BLD-RTO: 14.1 % (ref 11.5–14.5)
PLATELET # BLD: 266 K/UL (ref 130–400)
POTASSIUM SERPL-SCNC: 4 MEQ/L (ref 3.4–4.9)
RBC # BLD: 4.39 M/UL (ref 4.2–5.4)
SODIUM BLD-SCNC: 141 MEQ/L (ref 135–144)
T4 FREE: 1.16 NG/DL (ref 0.84–1.68)
TOTAL PROTEIN: 6.7 G/DL (ref 6.3–8)
TRIGL SERPL-MCNC: 144 MG/DL (ref 0–150)
TSH SERPL DL<=0.05 MIU/L-ACNC: 3.39 UIU/ML (ref 0.44–3.86)
VITAMIN D 25-HYDROXY: 43.6 NG/ML
WBC # BLD: 5 K/UL (ref 4.8–10.8)

## 2023-02-27 PROCEDURE — 77067 SCR MAMMO BI INCL CAD: CPT

## 2023-03-14 ENCOUNTER — OFFICE VISIT (OUTPATIENT)
Dept: FAMILY MEDICINE CLINIC | Age: 63
End: 2023-03-14
Payer: COMMERCIAL

## 2023-03-14 VITALS
RESPIRATION RATE: 16 BRPM | OXYGEN SATURATION: 98 % | TEMPERATURE: 97.3 F | HEIGHT: 64 IN | HEART RATE: 71 BPM | DIASTOLIC BLOOD PRESSURE: 80 MMHG | BODY MASS INDEX: 31.76 KG/M2 | WEIGHT: 186 LBS | SYSTOLIC BLOOD PRESSURE: 118 MMHG

## 2023-03-14 DIAGNOSIS — E66.09 CLASS 1 OBESITY DUE TO EXCESS CALORIES WITH SERIOUS COMORBIDITY AND BODY MASS INDEX (BMI) OF 31.0 TO 31.9 IN ADULT: Primary | ICD-10-CM

## 2023-03-14 DIAGNOSIS — E78.2 MIXED HYPERLIPIDEMIA: ICD-10-CM

## 2023-03-14 DIAGNOSIS — R73.03 PREDIABETES: ICD-10-CM

## 2023-03-14 DIAGNOSIS — E03.9 HYPOTHYROIDISM, UNSPECIFIED TYPE: ICD-10-CM

## 2023-03-14 PROBLEM — E66.811 CLASS 1 OBESITY DUE TO EXCESS CALORIES WITH SERIOUS COMORBIDITY AND BODY MASS INDEX (BMI) OF 31.0 TO 31.9 IN ADULT: Status: ACTIVE | Noted: 2023-03-14

## 2023-03-14 PROCEDURE — 99214 OFFICE O/P EST MOD 30 MIN: CPT | Performed by: NURSE PRACTITIONER

## 2023-03-14 RX ORDER — PHENTERMINE HYDROCHLORIDE 37.5 MG/1
37.5 CAPSULE ORAL EVERY MORNING
Qty: 30 CAPSULE | Refills: 0 | Status: SHIPPED | OUTPATIENT
Start: 2023-03-14 | End: 2023-04-13

## 2023-03-14 SDOH — ECONOMIC STABILITY: HOUSING INSECURITY
IN THE LAST 12 MONTHS, WAS THERE A TIME WHEN YOU DID NOT HAVE A STEADY PLACE TO SLEEP OR SLEPT IN A SHELTER (INCLUDING NOW)?: NO

## 2023-03-14 SDOH — ECONOMIC STABILITY: FOOD INSECURITY: WITHIN THE PAST 12 MONTHS, YOU WORRIED THAT YOUR FOOD WOULD RUN OUT BEFORE YOU GOT MONEY TO BUY MORE.: NEVER TRUE

## 2023-03-14 SDOH — ECONOMIC STABILITY: INCOME INSECURITY: HOW HARD IS IT FOR YOU TO PAY FOR THE VERY BASICS LIKE FOOD, HOUSING, MEDICAL CARE, AND HEATING?: NOT HARD AT ALL

## 2023-03-14 SDOH — ECONOMIC STABILITY: FOOD INSECURITY: WITHIN THE PAST 12 MONTHS, THE FOOD YOU BOUGHT JUST DIDN'T LAST AND YOU DIDN'T HAVE MONEY TO GET MORE.: NEVER TRUE

## 2023-03-14 ASSESSMENT — PATIENT HEALTH QUESTIONNAIRE - PHQ9
10. IF YOU CHECKED OFF ANY PROBLEMS, HOW DIFFICULT HAVE THESE PROBLEMS MADE IT FOR YOU TO DO YOUR WORK, TAKE CARE OF THINGS AT HOME, OR GET ALONG WITH OTHER PEOPLE: 0
SUM OF ALL RESPONSES TO PHQ QUESTIONS 1-9: 0
5. POOR APPETITE OR OVEREATING: 0
SUM OF ALL RESPONSES TO PHQ QUESTIONS 1-9: 0
3. TROUBLE FALLING OR STAYING ASLEEP: 0
SUM OF ALL RESPONSES TO PHQ9 QUESTIONS 1 & 2: 0
SUM OF ALL RESPONSES TO PHQ QUESTIONS 1-9: 0
8. MOVING OR SPEAKING SO SLOWLY THAT OTHER PEOPLE COULD HAVE NOTICED. OR THE OPPOSITE, BEING SO FIGETY OR RESTLESS THAT YOU HAVE BEEN MOVING AROUND A LOT MORE THAN USUAL: 0
SUM OF ALL RESPONSES TO PHQ QUESTIONS 1-9: 0
2. FEELING DOWN, DEPRESSED OR HOPELESS: 0
9. THOUGHTS THAT YOU WOULD BE BETTER OFF DEAD, OR OF HURTING YOURSELF: 0
6. FEELING BAD ABOUT YOURSELF - OR THAT YOU ARE A FAILURE OR HAVE LET YOURSELF OR YOUR FAMILY DOWN: 0
7. TROUBLE CONCENTRATING ON THINGS, SUCH AS READING THE NEWSPAPER OR WATCHING TELEVISION: 0
4. FEELING TIRED OR HAVING LITTLE ENERGY: 0
1. LITTLE INTEREST OR PLEASURE IN DOING THINGS: 0

## 2023-03-14 ASSESSMENT — ENCOUNTER SYMPTOMS
BLOOD IN STOOL: 0
TROUBLE SWALLOWING: 0
VOMITING: 0
SORE THROAT: 0
NAUSEA: 0
ABDOMINAL PAIN: 0
COUGH: 0
CHEST TIGHTNESS: 0
EYES NEGATIVE: 1
SHORTNESS OF BREATH: 0
DIARRHEA: 0
WHEEZING: 0
CONSTIPATION: 0

## 2023-03-14 NOTE — PROGRESS NOTES
Subjective:     Patient ID: Carmen Pearce is a 59 y.o. female who presentstoday for:  Chief Complaint   Patient presents with    Anxiety     Patient is here for a 3 month f/u on Anxiety. Hypothyroidism     Patient is here for a 3 month f/u on Hypothyroidism. Hyperlipidemia     Patient is here for a 3 month f/u on Hyperlipidemia. Weight Gain     Patient is here with c/o weight gain, she states she can only stick to a diet for about 2 weeks. HPI  Pt here for 3 month chronic disease management  Would like to discuss weight loss options    Past Medical History:   Diagnosis Date    Colon polyposis 2013    multiple on colonoscopy, do [id number]    Depression     Esophageal web 2013    Fibromyalgia 2011    GERD (gastroesophageal reflux disease)     Hyperlipidemia     Hypothyroidism     Macular degeneration     left eye    Normal nuclear stress test 3/21/13     Current Outpatient Medications on File Prior to Visit   Medication Sig Dispense Refill    rosuvastatin (CRESTOR) 40 MG tablet Take 1 tablet by mouth daily 90 tablet 3    omeprazole (PRILOSEC) 40 MG delayed release capsule Take 1 capsule by mouth daily 90 capsule 3    escitalopram (LEXAPRO) 20 MG tablet Take 1.5 tablets by mouth daily 135 tablet 0    tiZANidine (ZANAFLEX) 4 MG tablet Take 1 tablet by mouth 2 times daily as needed (spasm) 60 tablet 2    levothyroxine (SYNTHROID) 75 MCG tablet TAKE 1 TABLET BY MOUTH DAILY 90 tablet 3    Cholecalciferol (VITAMIN D3) 125 MCG (5000 UT) TABS Take by mouth Take one tablet po once daily four days out of the week      vitamin B-12 (CYANOCOBALAMIN) 100 MCG tablet Take 50 mcg by mouth daily      polyethylene glycol (MIRALAX) 17 GM/SCOOP powder Take 17 g by mouth daily 1 Bottle 1     No current facility-administered medications on file prior to visit.      Past Surgical History:   Procedure Laterality Date     SECTION      CHOLECYSTECTOMY  13    Lapchole with gram    COLONOSCOPY  2013 HERNIA REPAIR      HYSTERECTOMY (CERVIX STATUS UNKNOWN)      still with one ovary    UPPER GASTROINTESTINAL ENDOSCOPY  4/2013     BREAST FINE NEEDLE ASPIRATION Bilateral         Family History   Problem Relation Age of Onset    Heart Disease Mother         COPD, she has cervical cancer as well. Stroke Mother         No other fdr with cancer, no aneurysm or heart problems. Diabetes Mother     Cancer Father         STOMACH, committed suicide    Other Sister         low thyroid     Other Brother         ?stroke, not verified. Breast Cancer Maternal Aunt      Social History     Socioeconomic History    Marital status:      Spouse name: Not on file    Number of children: Not on file    Years of education: Not on file    Highest education level: Not on file   Occupational History    Occupation:      Employer: [employer]   Tobacco Use    Smoking status: Former     Packs/day: 0.25     Years: 35.00     Pack years: 8.75     Types: Cigarettes     Start date: 8/12/2013     Quit date: 4/1/2019     Years since quitting: 3.9    Smokeless tobacco: Never   Vaping Use    Vaping Use: Former    Substances: Always   Substance and Sexual Activity    Alcohol use: No     Alcohol/week: 0.0 standard drinks    Drug use: Not Currently    Sexual activity: Yes     Partners: Male   Other Topics Concern    Not on file   Social History Narrative    Not on file     Social Determinants of Health     Financial Resource Strain: Low Risk     Difficulty of Paying Living Expenses: Not hard at all   Food Insecurity: No Food Insecurity    Worried About [address] in the Last Year: Never true    [address]  in the Last Year: Never true   Transportation Needs: Unknown    Lack of Transportation (Medical): Not on file    Lack of Transportation (Non-Medical):  No   Physical Activity: Inactive    Days of Exercise per Week: 0 days    Minutes of Exercise per Session: 0 min   Stress: Not on file   Social Connections: Not on file   Intimate Partner Violence: Not At Risk    Fear of Current or Ex-Partner: No    Emotionally Abused: No    Physically Abused: No    Sexually Abused: No   Housing Stability: Unknown    Unable to Pay for Housing in the Last Year: Not on file    Number of Places Lived in the Last Year: Not on file    Unstable Housing in the Last Year: No     Allergies:  Patient has no known allergies. Review of Systems   Constitutional:  Positive for unexpected weight change (wt gain). Negative for chills, diaphoresis, fatigue and fever. HENT: Negative. Negative for congestion, sore throat and trouble swallowing. Eyes: Negative. Respiratory:  Negative for cough, chest tightness, shortness of breath and wheezing. Cardiovascular:  Negative for chest pain, palpitations and leg swelling. Gastrointestinal:  Negative for abdominal pain, blood in stool, constipation, diarrhea, nausea and vomiting. Endocrine: Negative. Genitourinary: Negative. Negative for difficulty urinating. Musculoskeletal:  Negative for arthralgias, gait problem, joint swelling and myalgias. Skin: Negative. Neurological:  Negative for dizziness, syncope, speech difficulty, weakness, light-headedness and headaches. Psychiatric/Behavioral:  Negative for agitation, dysphoric mood, self-injury, sleep disturbance and suicidal ideas. The patient is not nervous/anxious. Objective:    /80 (Site: Left Upper Arm, Position: Sitting, Cuff Size: Large Adult)   Pulse 71   Temp 97.3 °F (36.3 °C) (Infrared)   Resp 16   Ht 5' 4\" (1.626 m)   Wt 186 lb (84.4 kg)   SpO2 98%   BMI 31.93 kg/m²     Physical Exam  Constitutional:       General: She is not in acute distress. Appearance: She is well-developed. She is obese. She is not diaphoretic. HENT:      Head: Normocephalic and atraumatic.       Right Ear: External ear normal.      Left Ear: External ear normal.      Mouth/Throat:      Mouth: Mucous membranes are moist.   Eyes: Extraocular Movements: Extraocular movements intact. Conjunctiva/sclera: Conjunctivae normal.      Pupils: Pupils are equal, round, and reactive to light. Cardiovascular:      Rate and Rhythm: Normal rate and regular rhythm. Heart sounds: Normal heart sounds. Pulmonary:      Effort: Pulmonary effort is normal. No respiratory distress. Breath sounds: Normal breath sounds. No wheezing. Abdominal:      General: Bowel sounds are normal.      Palpations: Abdomen is soft. Tenderness: There is no abdominal tenderness. Musculoskeletal:         General: Normal range of motion. Cervical back: Normal range of motion and neck supple. No tenderness. Right lower leg: No edema. Left lower leg: No edema. Lymphadenopathy:      Cervical: No cervical adenopathy. Skin:     General: Skin is warm and dry. Findings: No rash. Neurological:      General: No focal deficit present. Mental Status: She is alert and oriented to person, place, and time. Psychiatric:         Mood and Affect: Mood normal.         Behavior: Behavior normal.         Thought Content: Thought content normal.         Judgment: Judgment normal.       Assessment & Plan:      1. Class 1 obesity due to excess calories with serious comorbidity and body mass index (BMI) of 31.0 to 31.9 in adult  Assessment & Plan:   Uncontrolled, changes made today: apidex  Orders:  -     phentermine (ADIPEX-P) 37.5 MG capsule; Take 1 capsule by mouth every morning for 30 days. Max Daily Amount: 37.5 mg, Disp-30 capsule, R-0Normal  2. Mixed hyperlipidemia  Assessment & Plan:   At goal, continue current medications  3. Prediabetes  Assessment & Plan:   Borderline controlled, lifestyle modifications recommended  4. Hypothyroidism, unspecified type  Assessment & Plan:   At goal, continue current medications    No orders of the defined types were placed in this encounter.     Orders Placed This Encounter   Medications    phentermine (ADIPEX-P) 37.5 MG capsule     Sig: Take 1 capsule by mouth every morning for 30 days. Max Daily Amount: 37.5 mg     Dispense:  30 capsule     Refill:  0     BMI 31.93     Medications Discontinued During This Encounter   Medication Reason    promethazine-dextromethorphan (PROMETHAZINE-DM) 6.25-15 MG/5ML syrup      Return in about 1 month (around 4/14/2023).      Reviewed with the patient: currentclinical status, medications, activities and diet.     Side effects, adverse effects of the medicationprescribed today, as well as treatment plan/ rationale and result expectations havebeen discussed with the patient who expresses understanding and desires to proceed.Pt instructions reviewed and given to patient.     Close follow up to evaluate treatment resultsand for coordination of care.  I have reviewed the patient's medical historyin detail and updated the computerized patient record.    Sharyn Engel, APRN - CNP

## 2023-05-10 ENCOUNTER — OFFICE VISIT (OUTPATIENT)
Dept: FAMILY MEDICINE CLINIC | Age: 63
End: 2023-05-10
Payer: COMMERCIAL

## 2023-05-10 VITALS
WEIGHT: 171 LBS | DIASTOLIC BLOOD PRESSURE: 80 MMHG | OXYGEN SATURATION: 99 % | HEART RATE: 83 BPM | SYSTOLIC BLOOD PRESSURE: 114 MMHG | RESPIRATION RATE: 16 BRPM | HEIGHT: 64 IN | BODY MASS INDEX: 29.19 KG/M2 | TEMPERATURE: 97.2 F

## 2023-05-10 DIAGNOSIS — M79.7 FIBROMYALGIA: ICD-10-CM

## 2023-05-10 DIAGNOSIS — R73.03 PREDIABETES: ICD-10-CM

## 2023-05-10 DIAGNOSIS — E66.3 OVER WEIGHT: Primary | ICD-10-CM

## 2023-05-10 DIAGNOSIS — E78.2 MIXED HYPERLIPIDEMIA: ICD-10-CM

## 2023-05-10 PROCEDURE — 99213 OFFICE O/P EST LOW 20 MIN: CPT | Performed by: NURSE PRACTITIONER

## 2023-05-10 RX ORDER — PHENTERMINE HYDROCHLORIDE 37.5 MG/1
37.5 CAPSULE ORAL EVERY MORNING
Qty: 30 CAPSULE | Refills: 0 | Status: SHIPPED | OUTPATIENT
Start: 2023-05-10 | End: 2023-06-09

## 2023-05-10 ASSESSMENT — ENCOUNTER SYMPTOMS
SORE THROAT: 0
EYES NEGATIVE: 1
ABDOMINAL PAIN: 0
COUGH: 0
VOMITING: 0
BLOOD IN STOOL: 0
WHEEZING: 0
TROUBLE SWALLOWING: 0
NAUSEA: 0
SHORTNESS OF BREATH: 0
CONSTIPATION: 0
CHEST TIGHTNESS: 0
DIARRHEA: 0

## 2023-05-10 NOTE — PROGRESS NOTES
Subjective:     Patient ID: Jase Phoenix is a 59 y.o. female who presentstoday for:  Chief Complaint   Patient presents with    Weight Management     Patient is here for a 4 week f/u on Adipex. HPI  Patient here for 2 month f/u adipex  Down 5lb this month for a total of 15lb  No adverse effects  Continues to increase activity  Watches diet. Not eating out. Has increased fruits, vegetables, and protein. Past Medical History:   Diagnosis Date    Colon polyposis 2013    multiple on colonoscopy, do [id number]    Depression     Esophageal web 2013    Fibromyalgia 2011    GERD (gastroesophageal reflux disease)     Hyperlipidemia     Hypothyroidism     Macular degeneration     left eye    Normal nuclear stress test 3/21/13     Current Outpatient Medications on File Prior to Visit   Medication Sig Dispense Refill    escitalopram (LEXAPRO) 20 MG tablet TAKE 1 AND 1/2 TABLETS BY MOUTH DAILY 135 tablet 0    rosuvastatin (CRESTOR) 40 MG tablet Take 1 tablet by mouth daily 90 tablet 3    omeprazole (PRILOSEC) 40 MG delayed release capsule Take 1 capsule by mouth daily 90 capsule 3    tiZANidine (ZANAFLEX) 4 MG tablet Take 1 tablet by mouth 2 times daily as needed (spasm) 60 tablet 2    levothyroxine (SYNTHROID) 75 MCG tablet TAKE 1 TABLET BY MOUTH DAILY 90 tablet 3    Cholecalciferol (VITAMIN D3) 125 MCG (5000 UT) TABS Take by mouth Take one tablet po once daily four days out of the week      vitamin B-12 (CYANOCOBALAMIN) 100 MCG tablet Take 0.5 tablets by mouth daily      polyethylene glycol (MIRALAX) 17 GM/SCOOP powder Take 17 g by mouth daily 1 Bottle 1     No current facility-administered medications on file prior to visit.      Past Surgical History:   Procedure Laterality Date     SECTION      CHOLECYSTECTOMY  13    Roselinee with gram    COLONOSCOPY  2013    HERNIA REPAIR      HYSTERECTOMY (CERVIX STATUS UNKNOWN)      still with one ovary    UPPER GASTROINTESTINAL ENDOSCOPY  2013

## 2023-05-23 ENCOUNTER — E-VISIT (OUTPATIENT)
Dept: FAMILY MEDICINE CLINIC | Age: 63
End: 2023-05-23

## 2023-05-23 DIAGNOSIS — B96.89 ACUTE BACTERIAL SINUSITIS: Primary | ICD-10-CM

## 2023-05-23 DIAGNOSIS — J01.90 ACUTE BACTERIAL SINUSITIS: Primary | ICD-10-CM

## 2023-05-23 RX ORDER — AMOXICILLIN AND CLAVULANATE POTASSIUM 875; 125 MG/1; MG/1
1 TABLET, FILM COATED ORAL 2 TIMES DAILY
Qty: 20 TABLET | Refills: 0 | Status: SHIPPED | OUTPATIENT
Start: 2023-05-23 | End: 2023-06-02

## 2023-05-23 ASSESSMENT — LIFESTYLE VARIABLES
SMOKING_YEARS: 20
PACKS_PER_DAY: 0
SMOKING_STATUS: NO, I'M A FORMER SMOKER

## 2023-05-23 NOTE — PROGRESS NOTES
Based off symptoms patient treated for sinus infection. ATB sent to pharmacy.   Time on visit 5 min I have personally seen and examined the patient. I have collaborated with and supervised the

## 2023-06-07 ENCOUNTER — OFFICE VISIT (OUTPATIENT)
Dept: FAMILY MEDICINE CLINIC | Age: 63
End: 2023-06-07
Payer: COMMERCIAL

## 2023-06-07 VITALS
HEART RATE: 83 BPM | BODY MASS INDEX: 28.34 KG/M2 | HEIGHT: 64 IN | RESPIRATION RATE: 16 BRPM | DIASTOLIC BLOOD PRESSURE: 82 MMHG | TEMPERATURE: 97.4 F | WEIGHT: 166 LBS | SYSTOLIC BLOOD PRESSURE: 124 MMHG | OXYGEN SATURATION: 99 %

## 2023-06-07 DIAGNOSIS — E78.2 MIXED HYPERLIPIDEMIA: ICD-10-CM

## 2023-06-07 DIAGNOSIS — R73.03 PREDIABETES: ICD-10-CM

## 2023-06-07 DIAGNOSIS — E66.3 OVER WEIGHT: Primary | ICD-10-CM

## 2023-06-07 PROCEDURE — 99213 OFFICE O/P EST LOW 20 MIN: CPT | Performed by: NURSE PRACTITIONER

## 2023-06-07 RX ORDER — PHENTERMINE HYDROCHLORIDE 37.5 MG/1
37.5 CAPSULE ORAL EVERY MORNING
Qty: 30 CAPSULE | Refills: 0 | Status: SHIPPED | OUTPATIENT
Start: 2023-06-07 | End: 2023-07-07

## 2023-06-07 ASSESSMENT — ENCOUNTER SYMPTOMS
NAUSEA: 0
CHEST TIGHTNESS: 0

## 2023-06-07 NOTE — PROGRESS NOTES
Subjective:     Patient ID: Julián Dumont is a 59 y.o. female who presentstoday for:  Chief Complaint   Patient presents with    Weight Management     Patient is here for a 4 week f/u on Adipex. HPI  Pt here for f/u adipex  Down 5 lb this month  Total down 20lb  Past Medical History:   Diagnosis Date    Colon polyposis 2013    multiple on colonoscopy, do [id number]    Depression     Esophageal web 2013    Fibromyalgia 2011    GERD (gastroesophageal reflux disease)     Hyperlipidemia     Hypothyroidism     Macular degeneration     left eye    Normal nuclear stress test 3/21/13     Current Outpatient Medications on File Prior to Visit   Medication Sig Dispense Refill    escitalopram (LEXAPRO) 20 MG tablet TAKE 1 AND 1/2 TABLETS BY MOUTH DAILY 135 tablet 0    rosuvastatin (CRESTOR) 40 MG tablet Take 1 tablet by mouth daily 90 tablet 3    omeprazole (PRILOSEC) 40 MG delayed release capsule Take 1 capsule by mouth daily 90 capsule 3    tiZANidine (ZANAFLEX) 4 MG tablet Take 1 tablet by mouth 2 times daily as needed (spasm) 60 tablet 2    levothyroxine (SYNTHROID) 75 MCG tablet TAKE 1 TABLET BY MOUTH DAILY 90 tablet 3    Cholecalciferol (VITAMIN D3) 125 MCG (5000 UT) TABS Take by mouth Take one tablet po once daily four days out of the week      vitamin B-12 (CYANOCOBALAMIN) 100 MCG tablet Take 0.5 tablets by mouth daily      polyethylene glycol (MIRALAX) 17 GM/SCOOP powder Take 17 g by mouth daily 1 Bottle 1     No current facility-administered medications on file prior to visit.      Past Surgical History:   Procedure Laterality Date     SECTION      CHOLECYSTECTOMY  13    Lapchole with gram    COLONOSCOPY  2013    HERNIA REPAIR      HYSTERECTOMY (CERVIX STATUS UNKNOWN)      still with one ovary    UPPER GASTROINTESTINAL ENDOSCOPY  2013     BREAST FINE NEEDLE ASPIRATION Bilateral         Family History   Problem Relation Age of Onset    Heart Disease Mother         COPD, she has

## 2023-07-31 DIAGNOSIS — F41.9 ANXIETY: ICD-10-CM

## 2023-07-31 RX ORDER — ESCITALOPRAM OXALATE 20 MG/1
TABLET ORAL
Qty: 135 TABLET | Refills: 0 | Status: SHIPPED | OUTPATIENT
Start: 2023-07-31

## 2023-07-31 NOTE — TELEPHONE ENCOUNTER
Rx requested:  Requested Prescriptions     Pending Prescriptions Disp Refills    escitalopram (LEXAPRO) 20 MG tablet [Pharmacy Med Name: ESCITALOPRAM 20MG TABLETS] 135 tablet 0     Sig: TAKE 1 AND 1/2 TABLETS BY MOUTH DAILY         Last Office Visit:   6/7/2023      Next Visit Date:  No future appointments.

## 2023-10-09 DIAGNOSIS — M47.812 CERVICAL SPONDYLOSIS WITHOUT MYELOPATHY: ICD-10-CM

## 2023-10-09 RX ORDER — TIZANIDINE 4 MG/1
4 TABLET ORAL 2 TIMES DAILY PRN
Qty: 60 TABLET | Refills: 2 | OUTPATIENT
Start: 2023-10-09

## 2023-10-09 RX ORDER — LEVOTHYROXINE SODIUM 0.07 MG/1
75 TABLET ORAL DAILY
Qty: 90 TABLET | Refills: 3 | Status: SHIPPED | OUTPATIENT
Start: 2023-10-09

## 2023-10-09 NOTE — TELEPHONE ENCOUNTER
Patient requesting medication refill.  Please approve or deny this request.    Rx requested:  Requested Prescriptions     Pending Prescriptions Disp Refills    levothyroxine (SYNTHROID) 75 MCG tablet 90 tablet 3     Sig: Take 1 tablet by mouth daily         Last Office Visit:   6/7/2023      Next Visit Date:  Future Appointments   Date Time Provider [address] [place] [address]   10/20/2023  3:00 PM Flako Viramontes, [address] [place] [address]

## 2023-10-17 ENCOUNTER — OFFICE VISIT (OUTPATIENT)
Dept: PAIN MANAGEMENT | Age: 63
End: 2023-10-17
Payer: COMMERCIAL

## 2023-10-17 VITALS
DIASTOLIC BLOOD PRESSURE: 72 MMHG | WEIGHT: 259 LBS | HEIGHT: 64 IN | BODY MASS INDEX: 44.22 KG/M2 | SYSTOLIC BLOOD PRESSURE: 122 MMHG

## 2023-10-17 DIAGNOSIS — M50.20 CERVICAL HERNIATED DISC: ICD-10-CM

## 2023-10-17 DIAGNOSIS — M47.812 CERVICAL SPONDYLOSIS WITHOUT MYELOPATHY: ICD-10-CM

## 2023-10-17 DIAGNOSIS — M54.12 CERVICAL RADICULOPATHY: Primary | ICD-10-CM

## 2023-10-17 PROCEDURE — 99214 OFFICE O/P EST MOD 30 MIN: CPT | Performed by: PAIN MEDICINE

## 2023-10-17 RX ORDER — TIZANIDINE 4 MG/1
4 TABLET ORAL 2 TIMES DAILY PRN
Qty: 60 TABLET | Refills: 2 | Status: SHIPPED | OUTPATIENT
Start: 2023-10-17

## 2023-10-17 ASSESSMENT — ENCOUNTER SYMPTOMS
SHORTNESS OF BREATH: 0
CONSTIPATION: 0
DIARRHEA: 0
NAUSEA: 0

## 2023-10-17 NOTE — PROGRESS NOTES
cervical paraspinal muscle sampling is free of any increased insertional activity or any abnormal spontaneous activity. SUMMARY:  This study is abnormal:  1. There is current electrodiagnostic evidence for right median mononeuropathy at the wrist consistent with a clinical diagnosis of Right carpal tunnel syndrome. This is mild in degree by electrical criteria. There is no active denervation on electromyography. 2. There is no current evidence for an active cervical motor radiculopathy or generalized large fiber sensorimotor peripheral polyneuropathy. RECOMMENDATIONS: Today's study does not explain the patient's right arm pain. The patient should follow up as previously instructed. If her symptoms persist or worsen, further electrodiagnostic evaluation may be considered if the patient is agreeable. Clinical correlation is recommended. Assessment:      Diagnosis Orders   1. Cervical radiculopathy  NH NJX DX/THER SBST INTRLMNR CRV/THRC W/IMG GDN      2. Cervical herniated disc  NH NJX DX/THER SBST INTRLMNR CRV/THRC W/IMG GDN      3. Cervical spondylosis without myelopathy  tiZANidine (ZANAFLEX) 4 MG tablet          Plan:          Orders Placed This Encounter   Medications    tiZANidine (ZANAFLEX) 4 MG tablet     Sig: Take 1 tablet by mouth 2 times daily as needed (spasm)     Dispense:  60 tablet     Refill:  2       Orders Placed This Encounter   Procedures    NH NJX DX/THER SBST INTRLMNR CRV/THRC W/IMG GDN     Cervical Interlaminar Epidural (for right C 6 radic symptoms)     Standing Status:   Future     Standing Expiration Date:   1/15/2024     Trial of Cervical Interlaminar Epidural injection as noted above for likely Right C6 radiculopathy as ordered above. Pertinent imaging reviewed. She has failed conservative treatment (PT, Medication). Radicular symptoms are predominant.     Discussed the risks including but not limited to bleeding, infection, worsened pain, damage to surrounding structures, side

## 2023-10-20 ENCOUNTER — OFFICE VISIT (OUTPATIENT)
Dept: FAMILY MEDICINE CLINIC | Age: 63
End: 2023-10-20
Payer: COMMERCIAL

## 2023-10-20 VITALS
WEIGHT: 158 LBS | DIASTOLIC BLOOD PRESSURE: 70 MMHG | SYSTOLIC BLOOD PRESSURE: 116 MMHG | BODY MASS INDEX: 26.98 KG/M2 | HEIGHT: 64 IN | HEART RATE: 68 BPM | TEMPERATURE: 97.8 F | OXYGEN SATURATION: 97 % | RESPIRATION RATE: 16 BRPM

## 2023-10-20 DIAGNOSIS — Z12.11 SCREEN FOR COLON CANCER: ICD-10-CM

## 2023-10-20 DIAGNOSIS — Z23 NEED FOR INFLUENZA VACCINATION: ICD-10-CM

## 2023-10-20 DIAGNOSIS — R73.03 PREDIABETES: ICD-10-CM

## 2023-10-20 DIAGNOSIS — E78.2 MIXED HYPERLIPIDEMIA: ICD-10-CM

## 2023-10-20 DIAGNOSIS — E03.9 HYPOTHYROIDISM, UNSPECIFIED TYPE: ICD-10-CM

## 2023-10-20 DIAGNOSIS — Z00.00 ANNUAL PHYSICAL EXAM: Primary | ICD-10-CM

## 2023-10-20 DIAGNOSIS — K21.00 GASTROESOPHAGEAL REFLUX DISEASE WITH ESOPHAGITIS WITHOUT HEMORRHAGE: ICD-10-CM

## 2023-10-20 PROCEDURE — 90674 CCIIV4 VAC NO PRSV 0.5 ML IM: CPT | Performed by: NURSE PRACTITIONER

## 2023-10-20 PROCEDURE — 99396 PREV VISIT EST AGE 40-64: CPT | Performed by: NURSE PRACTITIONER

## 2023-10-20 PROCEDURE — 90471 IMMUNIZATION ADMIN: CPT | Performed by: NURSE PRACTITIONER

## 2023-10-20 ASSESSMENT — ENCOUNTER SYMPTOMS
SHORTNESS OF BREATH: 0
SORE THROAT: 0
NAUSEA: 0
TROUBLE SWALLOWING: 0
VOMITING: 0
CONSTIPATION: 0
WHEEZING: 0
COUGH: 0
DIARRHEA: 0
CHEST TIGHTNESS: 0
EYES NEGATIVE: 1
ABDOMINAL PAIN: 0
BLOOD IN STOOL: 0

## 2023-10-20 NOTE — PROGRESS NOTES
Vaccine Information Sheet, \"Influenza - Inactivated\"  given to [employer], or parent/legal guardian of  Monika Maddox and verbalized understanding. Patient responses:    Have you ever had a reaction to a flu vaccine? No  Are you able to eat eggs without adverse effects? Yes  Do you have any current illness? No  Have you ever had Guillian Sebring Syndrome? No    Flu vaccine given per order. Please see immunization tab.
10/19/2024    Comprehensive Metabolic Panel     Standing Status:   Future     Standing Expiration Date:   10/20/2024    Lipid Panel     Standing Status:   Future     Standing Expiration Date:   10/20/2024     Order Specific Question:   Is Patient Fasting?/# of Hours     Answer:   yes    TSH     Standing Status:   Future     Standing Expiration Date:   10/20/2024    Hemoglobin A1C     Standing Status:   Future     Standing Expiration Date:   10/20/2024    [facility], Verner Correia, Gastroenterology, [facility]     Referral Priority:   Routine     Referral Type:   Eval and Treat     Referral Reason:   Specialty Services Required     Referred to Provider:   Kelly Durant MD     Requested Specialty:   Gastroenterology     Number of Visits Requested:   1     No orders of the defined types were placed in this encounter. There are no discontinued medications. Return in about 6 months (around 4/20/2024). Reviewed with the patient: currentclinical status, medications, activities and diet. Side effects, adverse effects of the medicationprescribed today, as well as treatment plan/ rationale and result expectations havebeen discussed with the patient who expresses understanding and desires to proceed. Pt instructions reviewed and given to patient. Close follow up to evaluate treatment resultsand for coordination of care. I have reviewed the patient's medical historyin detail and updated the computerized patient record.     ROSIBEL Almonte - CNP

## 2023-11-06 ENCOUNTER — PATIENT MESSAGE (OUTPATIENT)
Dept: FAMILY MEDICINE CLINIC | Age: 63
End: 2023-11-06

## 2023-11-06 DIAGNOSIS — Z72.0 TOBACCO ABUSE: Primary | ICD-10-CM

## 2023-11-07 ENCOUNTER — PROCEDURE VISIT (OUTPATIENT)
Dept: PAIN MANAGEMENT | Age: 63
End: 2023-11-07
Payer: COMMERCIAL

## 2023-11-07 DIAGNOSIS — M54.12 CERVICAL RADICULOPATHY: Primary | ICD-10-CM

## 2023-11-07 PROCEDURE — 62321 NJX INTERLAMINAR CRV/THRC: CPT | Performed by: PHYSICAL MEDICINE & REHABILITATION

## 2023-11-07 RX ORDER — DEXAMETHASONE SODIUM PHOSPHATE 10 MG/ML
10 INJECTION, SOLUTION INTRAMUSCULAR; INTRAVENOUS ONCE
Status: COMPLETED | OUTPATIENT
Start: 2023-11-07 | End: 2023-11-07

## 2023-11-07 RX ORDER — LIDOCAINE HYDROCHLORIDE 10 MG/ML
2 INJECTION, SOLUTION EPIDURAL; INFILTRATION; INTRACAUDAL; PERINEURAL ONCE
Status: COMPLETED | OUTPATIENT
Start: 2023-11-07 | End: 2023-11-07

## 2023-11-07 RX ORDER — SODIUM CHLORIDE 9 MG/ML
2 INJECTION INTRAVENOUS ONCE
Status: COMPLETED | OUTPATIENT
Start: 2023-11-07 | End: 2023-11-07

## 2023-11-07 RX ADMIN — Medication 1 MEQ: at 09:13

## 2023-11-07 RX ADMIN — DEXAMETHASONE SODIUM PHOSPHATE 10 MG: 10 INJECTION, SOLUTION INTRAMUSCULAR; INTRAVENOUS at 09:12

## 2023-11-07 RX ADMIN — SODIUM CHLORIDE 2 ML: 9 INJECTION INTRAVENOUS at 09:14

## 2023-11-07 RX ADMIN — LIDOCAINE HYDROCHLORIDE 2 ML: 10 INJECTION, SOLUTION EPIDURAL; INFILTRATION; INTRACAUDAL; PERINEURAL at 09:13

## 2023-11-07 NOTE — PROGRESS NOTES
Cervical Interlaminar Epidural Corticosteroid Injection (ILESI) under Fluoroscopic Guidance         Patient Name: Manjinder Ernst  : 1960  Date: 2023   Provider: Dianna Gonzalez MD    PROCEDURE:  Cervical interlaminar epidural corticosteroid injection (ILESI) at the C7/T1 level under fluoroscopic guidance    INDICATIONS: Manjinder Ernst is a 59 y.o. female who presents with symptoms and physical exam findings consistent with cervical radiculopathy. She has cervical paresthesias with a positive Spurling's maneuver on physical exam. She has had persistent pain that limits her activities of daily living such as upper body dressing. The pain is persistent despite conservative measures including home exercise program, physical therapy, and anti-inflammatories. Given her symptoms, physical exam findings, impairment in activities of daily living, and lack of response to conservative measures, consideration for C7/T1 Cervical interlaminar epidural corticosteroid injection under fluoroscopic guidance was given. Discussed the risks including but not limited to bleeding, infection, worsened pain, damage to surrounding structures, side effects, toxicity, allergic reactions to medications used, need for surgery, headache, vision changes, difficulty with chewing and/or swallowing, pneumothorax, immune and stress-response dysfunction, fat necrosis, avascular necrosis, skin pigmentation changes, blood sugar elevation, as well as catastrophic injury such as vision loss/blindness, paralysis, spinal cord or plexus injury, cerebral brainstem or spinal cord infarction, intrathecal injection, spinal cord puncture, persistent dural leak, arachnoiditis, stroke, bowel/bladder/sexual dysfunction, ventilator dependence, loss of use of the arms and/or legs, and death. Discussed off-label use of corticosteroids and how the Food and Drug Administration (FDA) has not approved corticosteroids for epidural use.  Discussed the

## 2023-11-13 ENCOUNTER — PREP FOR PROCEDURE (OUTPATIENT)
Dept: GASTROENTEROLOGY | Age: 63
End: 2023-11-13

## 2023-11-13 ENCOUNTER — OFFICE VISIT (OUTPATIENT)
Dept: GASTROENTEROLOGY | Age: 63
End: 2023-11-13
Payer: COMMERCIAL

## 2023-11-13 VITALS
BODY MASS INDEX: 29.01 KG/M2 | SYSTOLIC BLOOD PRESSURE: 134 MMHG | OXYGEN SATURATION: 96 % | WEIGHT: 169 LBS | HEART RATE: 74 BPM | DIASTOLIC BLOOD PRESSURE: 70 MMHG

## 2023-11-13 DIAGNOSIS — F41.9 ANXIETY: ICD-10-CM

## 2023-11-13 DIAGNOSIS — K63.5 POLYP OF COLON, UNSPECIFIED PART OF COLON, UNSPECIFIED TYPE: Primary | ICD-10-CM

## 2023-11-13 DIAGNOSIS — K21.9 GASTROESOPHAGEAL REFLUX DISEASE WITHOUT ESOPHAGITIS: ICD-10-CM

## 2023-11-13 DIAGNOSIS — K63.5 HYPERPLASTIC POLYP OF INTESTINE: ICD-10-CM

## 2023-11-13 PROCEDURE — 99204 OFFICE O/P NEW MOD 45 MIN: CPT | Performed by: INTERNAL MEDICINE

## 2023-11-13 RX ORDER — SODIUM, POTASSIUM,MAG SULFATES 17.5-3.13G
SOLUTION, RECONSTITUTED, ORAL ORAL
Qty: 1 EACH | Refills: 0 | Status: SHIPPED | OUTPATIENT
Start: 2023-11-13

## 2023-11-13 RX ORDER — ESCITALOPRAM OXALATE 20 MG/1
TABLET ORAL
Qty: 135 TABLET | Refills: 0 | Status: SHIPPED | OUTPATIENT
Start: 2023-11-13

## 2023-11-13 ASSESSMENT — ENCOUNTER SYMPTOMS
CONSTIPATION: 0
VOMITING: 0
RECTAL PAIN: 0
COLOR CHANGE: 0
PHOTOPHOBIA: 0
ABDOMINAL PAIN: 0
DIARRHEA: 1
NAUSEA: 0
CHEST TIGHTNESS: 0
TROUBLE SWALLOWING: 1
EYE PAIN: 0
SHORTNESS OF BREATH: 0
EYE REDNESS: 0
WHEEZING: 0
BLOOD IN STOOL: 0
ABDOMINAL DISTENTION: 0
VOICE CHANGE: 0

## 2023-11-13 NOTE — PROGRESS NOTES
[employer]   Tobacco Use    Smoking status: Former     Packs/day: 1.00     Years: 35.00     Additional pack years: 0.00     Total pack years: 35.00     Types: Cigarettes     Start date: 1988     Quit date: 2019     Years since quittin.6    Smokeless tobacco: Never   Vaping Use    Vaping Use: Former    Substances: Always   Substance and Sexual Activity    Alcohol use: No     Alcohol/week: 0.0 standard drinks of alcohol    Drug use: Not Currently    Sexual activity: Yes     Partners: Male   Other Topics Concern    Not on file   Social History Narrative    Not on file     Social Determinants of Health     Financial Resource Strain: Low Risk  (3/14/2023)    Overall Financial Resource Strain (CARDIA)     Difficulty of Paying Living Expenses: Not hard at all   Food Insecurity: No Food Insecurity (3/14/2023)    Hunger Vital Sign     Worried About Running Out of Food in the Last Year: Never true     [address] in the Last Year: Never true   Transportation Needs: Unknown (3/14/2023)    PRAPARE - Transportation     Lack of Transportation (Medical): Not on file     Lack of Transportation (Non-Medical): No   Physical Activity: Inactive (2022)    Exercise Vital Sign     Days of Exercise per Week: 0 days     Minutes of Exercise per Session: 0 min   Stress: Not on file   Social Connections: Not on file   Intimate Partner Violence: Not At Risk (2022)    Humiliation, Afraid, Rape, and Kick questionnaire     Fear of Current or Ex-Partner: No     Emotionally Abused: No     Physically Abused: No     Sexually Abused: No   Housing Stability: Unknown (3/14/2023)    Housing Stability Vital Sign     Unable to Pay for Housing in the Last Year: Not on file     Number of [address] in the Last Year: Not on file     Unstable Housing in the Last Year: No     Family History   Problem Relation Age of Onset    Heart Disease Mother         COPD, she has cervical cancer as well.      Stroke Mother         No other fdr

## 2023-11-24 ENCOUNTER — HOSPITAL ENCOUNTER (OUTPATIENT)
Dept: CT IMAGING | Age: 63
Discharge: HOME | End: 2023-11-24
Payer: COMMERCIAL

## 2023-11-24 DIAGNOSIS — Z72.0 TOBACCO ABUSE: ICD-10-CM

## 2023-11-24 PROCEDURE — 71271 CT THORAX LUNG CANCER SCR C-: CPT

## 2023-12-26 RX ORDER — ROSUVASTATIN CALCIUM 40 MG/1
40 TABLET, COATED ORAL DAILY
Qty: 90 TABLET | Refills: 0 | Status: SHIPPED | OUTPATIENT
Start: 2023-12-26

## 2023-12-27 RX ORDER — SODIUM CHLORIDE 9 MG/ML
INJECTION, SOLUTION INTRAVENOUS PRN
Status: CANCELLED | OUTPATIENT
Start: 2023-12-27

## 2023-12-27 RX ORDER — SODIUM CHLORIDE 9 MG/ML
INJECTION, SOLUTION INTRAVENOUS CONTINUOUS
Status: CANCELLED | OUTPATIENT
Start: 2023-12-27

## 2023-12-27 RX ORDER — SODIUM CHLORIDE 0.9 % (FLUSH) 0.9 %
5-40 SYRINGE (ML) INJECTION EVERY 12 HOURS SCHEDULED
Status: CANCELLED | OUTPATIENT
Start: 2023-12-27

## 2023-12-29 ENCOUNTER — ANESTHESIA (OUTPATIENT)
Dept: ENDOSCOPY | Age: 63
End: 2023-12-29
Payer: COMMERCIAL

## 2023-12-29 ENCOUNTER — HOSPITAL ENCOUNTER (OUTPATIENT)
Age: 63
Setting detail: OUTPATIENT SURGERY
Discharge: HOME | End: 2023-12-29
Attending: INTERNAL MEDICINE | Admitting: INTERNAL MEDICINE
Payer: COMMERCIAL

## 2023-12-29 ENCOUNTER — ANESTHESIA EVENT (OUTPATIENT)
Dept: ENDOSCOPY | Age: 63
End: 2023-12-29
Payer: COMMERCIAL

## 2023-12-29 VITALS
SYSTOLIC BLOOD PRESSURE: 113 MMHG | WEIGHT: 175 LBS | DIASTOLIC BLOOD PRESSURE: 57 MMHG | TEMPERATURE: 97.1 F | HEIGHT: 64 IN | RESPIRATION RATE: 16 BRPM | HEART RATE: 61 BPM | BODY MASS INDEX: 29.88 KG/M2 | OXYGEN SATURATION: 96 %

## 2023-12-29 DIAGNOSIS — K63.5 HYPERPLASTIC POLYP OF INTESTINE: ICD-10-CM

## 2023-12-29 DIAGNOSIS — K21.9 ESOPHAGEAL REFLUX: ICD-10-CM

## 2023-12-29 PROBLEM — K57.90 DIVERTICULOSIS: Status: ACTIVE | Noted: 2023-12-29

## 2023-12-29 PROCEDURE — 3609017100 HC EGD: Performed by: INTERNAL MEDICINE

## 2023-12-29 PROCEDURE — 88342 IMHCHEM/IMCYTCHM 1ST ANTB: CPT

## 2023-12-29 PROCEDURE — 3700000000 HC ANESTHESIA ATTENDED CARE: Performed by: INTERNAL MEDICINE

## 2023-12-29 PROCEDURE — C1769 GUIDE WIRE: HCPCS | Performed by: INTERNAL MEDICINE

## 2023-12-29 PROCEDURE — 7100000011 HC PHASE II RECOVERY - ADDTL 15 MIN: Performed by: INTERNAL MEDICINE

## 2023-12-29 PROCEDURE — 88305 TISSUE EXAM BY PATHOLOGIST: CPT

## 2023-12-29 PROCEDURE — 7100000010 HC PHASE II RECOVERY - FIRST 15 MIN: Performed by: INTERNAL MEDICINE

## 2023-12-29 PROCEDURE — 2500000003 HC RX 250 WO HCPCS: Performed by: NURSE ANESTHETIST, CERTIFIED REGISTERED

## 2023-12-29 PROCEDURE — 6360000002 HC RX W HCPCS: Performed by: NURSE ANESTHETIST, CERTIFIED REGISTERED

## 2023-12-29 PROCEDURE — 6370000000 HC RX 637 (ALT 250 FOR IP): Performed by: INTERNAL MEDICINE

## 2023-12-29 PROCEDURE — 2709999900 HC NON-CHARGEABLE SUPPLY: Performed by: INTERNAL MEDICINE

## 2023-12-29 PROCEDURE — 2580000003 HC RX 258

## 2023-12-29 PROCEDURE — 3609027000 HC COLONOSCOPY: Performed by: INTERNAL MEDICINE

## 2023-12-29 PROCEDURE — 3700000001 HC ADD 15 MINUTES (ANESTHESIA): Performed by: INTERNAL MEDICINE

## 2023-12-29 PROCEDURE — 2580000003 HC RX 258: Performed by: INTERNAL MEDICINE

## 2023-12-29 RX ORDER — SODIUM CHLORIDE 9 MG/ML
INJECTION, SOLUTION INTRAVENOUS PRN
Status: DISCONTINUED | OUTPATIENT
Start: 2023-12-29 | End: 2023-12-29 | Stop reason: HOSPADM

## 2023-12-29 RX ORDER — SODIUM CHLORIDE 9 MG/ML
INJECTION, SOLUTION INTRAVENOUS
Status: COMPLETED
Start: 2023-12-29 | End: 2023-12-29

## 2023-12-29 RX ORDER — SODIUM CHLORIDE 0.9 % (FLUSH) 0.9 %
5-40 SYRINGE (ML) INJECTION EVERY 12 HOURS SCHEDULED
Status: DISCONTINUED | OUTPATIENT
Start: 2023-12-29 | End: 2023-12-29 | Stop reason: HOSPADM

## 2023-12-29 RX ORDER — LIDOCAINE HYDROCHLORIDE 20 MG/ML
INJECTION, SOLUTION EPIDURAL; INFILTRATION; INTRACAUDAL; PERINEURAL PRN
Status: DISCONTINUED | OUTPATIENT
Start: 2023-12-29 | End: 2023-12-29 | Stop reason: SDUPTHER

## 2023-12-29 RX ORDER — MAGNESIUM HYDROXIDE 1200 MG/15ML
LIQUID ORAL PRN
Status: DISCONTINUED | OUTPATIENT
Start: 2023-12-29 | End: 2023-12-29 | Stop reason: ALTCHOICE

## 2023-12-29 RX ORDER — PROPOFOL 10 MG/ML
INJECTION, EMULSION INTRAVENOUS PRN
Status: DISCONTINUED | OUTPATIENT
Start: 2023-12-29 | End: 2023-12-29 | Stop reason: SDUPTHER

## 2023-12-29 RX ORDER — SODIUM CHLORIDE 9 MG/ML
INJECTION, SOLUTION INTRAVENOUS CONTINUOUS
Status: DISCONTINUED | OUTPATIENT
Start: 2023-12-29 | End: 2023-12-29 | Stop reason: HOSPADM

## 2023-12-29 RX ORDER — SIMETHICONE 20 MG/.3ML
EMULSION ORAL PRN
Status: DISCONTINUED | OUTPATIENT
Start: 2023-12-29 | End: 2023-12-29 | Stop reason: ALTCHOICE

## 2023-12-29 RX ADMIN — LIDOCAINE HYDROCHLORIDE 40 MG: 20 INJECTION, SOLUTION EPIDURAL; INFILTRATION; INTRACAUDAL; PERINEURAL at 09:55

## 2023-12-29 RX ADMIN — PROPOFOL 50 MG: 10 INJECTION, EMULSION INTRAVENOUS at 09:57

## 2023-12-29 RX ADMIN — SODIUM CHLORIDE: 9 INJECTION, SOLUTION INTRAVENOUS at 09:48

## 2023-12-29 RX ADMIN — PROPOFOL 100 MG: 10 INJECTION, EMULSION INTRAVENOUS at 09:55

## 2023-12-29 RX ADMIN — PROPOFOL 50 MG: 10 INJECTION, EMULSION INTRAVENOUS at 10:00

## 2023-12-29 RX ADMIN — PROPOFOL 50 MG: 10 INJECTION, EMULSION INTRAVENOUS at 10:08

## 2023-12-29 RX ADMIN — PROPOFOL 100 MG: 10 INJECTION, EMULSION INTRAVENOUS at 10:14

## 2023-12-29 RX ADMIN — PROPOFOL 50 MG: 10 INJECTION, EMULSION INTRAVENOUS at 10:03

## 2023-12-29 ASSESSMENT — PAIN - FUNCTIONAL ASSESSMENT
PAIN_FUNCTIONAL_ASSESSMENT: NONE - DENIES PAIN
PAIN_FUNCTIONAL_ASSESSMENT: 0-10
PAIN_FUNCTIONAL_ASSESSMENT: NONE - DENIES PAIN

## 2023-12-29 NOTE — ANESTHESIA POSTPROCEDURE EVALUATION
Department of Anesthesiology  Postprocedure Note    Patient: Krysten Nickerson  MRN: [id number]  YOB: 1960  Date of evaluation: 12/29/2023    Procedure Summary       Date: 12/29/23 Room / Location: [address] [place] [address] 01 / Huy Claros    Anesthesia Start: 8336 Anesthesia Stop: 1020    Procedures:       EGD DIAGNOSTIC with dilation, polypectomy and biopsy      COLONOSCOPY DIAGNOSTIC Diagnosis:       Esophageal reflux      Hyperplastic polyp of intestine      (Esophageal reflux [K21.9])      (Hyperplastic polyp of intestine [K63.5])    Surgeons: Tavo Hunter MD Responsible Provider: ROSIBEL Castañeda CRNA    Anesthesia Type: MAC ASA Status: 3            Anesthesia Type: No value filed. Alvaro Phase I: Alvaro Score: 10    Alvaro Phase II:      Anesthesia Post Evaluation    Patient location during evaluation: bedside  Patient participation: complete - patient participated  Level of consciousness: awake  Airway patency: patent  Nausea & Vomiting: no nausea and no vomiting  Cardiovascular status: blood pressure returned to baseline  Respiratory status: acceptable  Hydration status: euvolemic  Pain management: adequate        No notable events documented.

## 2023-12-29 NOTE — ANESTHESIA PRE PROCEDURE
RBC 4.23 03/14/2012 08:25 AM    HGB 12.7 02/27/2023 08:47 AM    HCT 38.6 02/27/2023 08:47 AM    MCV 87.9 02/27/2023 08:47 AM    RDW 14.1 02/27/2023 08:47 AM     02/27/2023 08:47 AM       CMP:   Lab Results   Component Value Date/Time     02/27/2023 08:47 AM    K 4.0 02/27/2023 08:47 AM     02/27/2023 08:47 AM    CO2 23 02/27/2023 08:47 AM    BUN 13 02/27/2023 08:47 AM    CREATININE 0.89 02/27/2023 08:47 AM    GFRAA >60.0 08/10/2022 10:22 AM    LABGLOM >60.0 02/27/2023 08:47 AM    GLUCOSE 96 02/27/2023 08:47 AM    GLUCOSE 92 03/14/2012 08:25 AM    PROT 6.7 02/27/2023 08:47 AM    CALCIUM 9.0 02/27/2023 08:47 AM    BILITOT 0.7 02/27/2023 08:51 AM    ALKPHOS 94 02/27/2023 08:47 AM    AST 16 02/27/2023 08:47 AM    ALT 15 02/27/2023 08:47 AM       POC Tests: No results for input(s): \"POCGLU\", \"POCNA\", \"POCK\", \"POCCL\", \"POCBUN\", \"POCHEMO\", \"POCHCT\" in the last 72 hours. Coags: No results found for: \"PROTIME\", \"INR\", \"APTT\"    HCG (If Applicable): No results found for: \"PREGTESTUR\", \"PREGSERUM\", \"HCG\", \"HCGQUANT\"     ABGs: No results found for: \"PHART\", \"PO2ART\", \"KKD7RDJ\", \"GSV7WYJ\", \"BEART\", \"T1NLYSND\"     Type & Screen (If Applicable):  No results found for: \"LABABO\", \"LABRH\"    Drug/Infectious Status (If Applicable):  No results found for: \"HIV\", \"HEPCAB\"    COVID-19 Screening (If Applicable): No results found for: \"COVID19\"        Anesthesia Evaluation  Patient summary reviewed and Nursing notes reviewed  Airway: Mallampati: III  TM distance: >3 FB   Neck ROM: full  Mouth opening: > = 3 FB   Dental:          Pulmonary:normal exam                               Cardiovascular:    (+) hyperlipidemia                  Neuro/Psych:   (+) depression/anxiety             GI/Hepatic/Renal:   (+) GERD:, bowel prep          Endo/Other:    (+) Diabetes. Abdominal:   (+) obese          Vascular:           Other Findings:             Anesthesia Plan      MAC     ASA 3       Induction:
No

## 2023-12-29 NOTE — H&P
Patient Name: Monika Maddox  : 1960  MRN: [id number]  DATE: 23      ENDOSCOPY  History and Physical    Procedure:    [x] Diagnostic Colonoscopy       [] Screening Colonoscopy  [x] EGD      [] ERCP      [] EUS       [] Other    [x] Previous office notes/History and Physical reviewed from the patients chart. Please see EMR for further details of HPI. I have examined the patient's status immediately prior to the procedure and:      Indications/HPI:    []Abdominal Pain   []Cancer- GI/Lung  []Fhx of colon CA/polyps  []History of Polyps   []Gaona’s   []Melena  []Abnormal Imaging   [x]Dysphagia    []Persistent Pneumonia  []Anemia   []Food Impaction  [x]History of Polyps  []GI Bleed   []Pulmonary nodule/Mass  []Change in bowel habits  []Heartburn/Reflux  []Rectal Bleed (BRBPR)  []Chest Pain - Non Cardiac  []Heme (+) Stool  []Ulcers  []Constipation   []Hemoptysis   []Varices  []Diarrhea   []Hypoxemia  []Nausea/Vomiting   []Screening   []Crohns/Colitis  []Other:    Anesthesia:   [x] MAC [] Moderate Sedation   [] General   [] None     ROS: 12 pt Review of Symptoms was negative unless mentioned above    Medications:   Prior to Admission medications    Medication Sig Start Date End Date Taking? Authorizing Provider   rosuvastatin (CRESTOR) 40 MG tablet TAKE 1 TABLET BY MOUTH DAILY 23   Trevor Cote MD   escitalopram (LEXAPRO) 20 MG tablet TAKE 1 AND 1/2 TABLETS BY MOUTH DAILY 23   Sharyn Engel APRN - CNP   sodium-potassium-mag sulfate (SUPREP) 17.5-3.13-1.6 GM/177ML SOLN solution As directed  Patient not taking: Reported on 2023   Jose Raul Freeman MD   tiZANidine (ZANAFLEX) 4 MG tablet Take 1 tablet by mouth 2 times daily as needed (spasm) 10/17/23   Sandi Torres MD   levothyroxine (SYNTHROID) 75 MCG tablet Take 1 tablet by mouth daily 10/9/23   Sharyn Engel APRN - CNP   omeprazole (PRILOSEC) 40 MG delayed release capsule Take 1 capsule by mouth daily 22    []Comments:  Other:   []WNL  []Comments:    Informed Consent:  The risks and benefits of the procedure have been discussed with either the patient or if they cannot consent, their representative. Assessment:  Patient examined and appropriate for planned sedation and procedure. Plan:  Proceed with planned sedation and procedure as above.     Ezell Gottron, MD  9:49 AM

## 2024-01-10 DIAGNOSIS — K21.9 GASTROESOPHAGEAL REFLUX DISEASE WITHOUT ESOPHAGITIS: ICD-10-CM

## 2024-01-10 RX ORDER — OMEPRAZOLE 40 MG/1
40 CAPSULE, DELAYED RELEASE ORAL DAILY
Qty: 90 CAPSULE | Refills: 3 | Status: SHIPPED | OUTPATIENT
Start: 2024-01-10

## 2024-02-07 DIAGNOSIS — F41.9 ANXIETY: ICD-10-CM

## 2024-02-07 RX ORDER — ESCITALOPRAM OXALATE 20 MG/1
TABLET ORAL
Qty: 135 TABLET | Refills: 3 | Status: SHIPPED | OUTPATIENT
Start: 2024-02-07

## 2024-02-16 ENCOUNTER — TELEPHONE (OUTPATIENT)
Dept: FAMILY MEDICINE CLINIC | Age: 64
End: 2024-02-16

## 2024-02-16 DIAGNOSIS — Z12.31 ENCOUNTER FOR SCREENING MAMMOGRAM FOR MALIGNANT NEOPLASM OF BREAST: Primary | ICD-10-CM

## 2024-02-16 NOTE — TELEPHONE ENCOUNTER
Patient requesting mammogram orders       LOV 10/20/23 with Sharyn Engel    Next office visit 3/18/24     Patient phone [phone]

## 2024-03-19 ENCOUNTER — HOSPITAL ENCOUNTER (OUTPATIENT)
Dept: WOMENS IMAGING | Age: 64
Discharge: HOME | End: 2024-03-21
Payer: COMMERCIAL

## 2024-03-19 VITALS — WEIGHT: 170 LBS | BODY MASS INDEX: 29.18 KG/M2

## 2024-03-19 DIAGNOSIS — Z12.31 ENCOUNTER FOR SCREENING MAMMOGRAM FOR MALIGNANT NEOPLASM OF BREAST: ICD-10-CM

## 2024-03-19 DIAGNOSIS — E03.9 HYPOTHYROIDISM, UNSPECIFIED TYPE: ICD-10-CM

## 2024-03-19 DIAGNOSIS — M47.812 CERVICAL SPONDYLOSIS WITHOUT MYELOPATHY: ICD-10-CM

## 2024-03-19 DIAGNOSIS — E78.2 MIXED HYPERLIPIDEMIA: ICD-10-CM

## 2024-03-19 DIAGNOSIS — R73.03 PREDIABETES: ICD-10-CM

## 2024-03-19 LAB
ALBUMIN SERPL-MCNC: 4.2 G/DL (ref 3.5–4.6)
ALP SERPL-CCNC: 95 U/L (ref 40–130)
ALT SERPL-CCNC: 16 U/L (ref 0–33)
ANION GAP SERPL CALCULATED.3IONS-SCNC: 12 MEQ/L (ref 9–15)
AST SERPL-CCNC: 15 U/L (ref 0–35)
BASOPHILS # BLD: 0.1 K/UL (ref 0–0.2)
BASOPHILS NFR BLD: 1.1 %
BILIRUB SERPL-MCNC: 0.4 MG/DL (ref 0.2–0.7)
BUN SERPL-MCNC: 18 MG/DL (ref 8–23)
CALCIUM SERPL-MCNC: 8.8 MG/DL (ref 8.5–9.9)
CHLORIDE SERPL-SCNC: 104 MEQ/L (ref 95–107)
CO2 SERPL-SCNC: 24 MEQ/L (ref 20–31)
CREAT SERPL-MCNC: 0.92 MG/DL (ref 0.5–0.9)
EOSINOPHIL # BLD: 0.1 K/UL (ref 0–0.7)
EOSINOPHIL NFR BLD: 2.3 %
ERYTHROCYTE [DISTWIDTH] IN BLOOD BY AUTOMATED COUNT: 13.7 % (ref 11.5–14.5)
GLOBULIN SER CALC-MCNC: 2.4 G/DL (ref 2.3–3.5)
GLUCOSE SERPL-MCNC: 100 MG/DL (ref 70–99)
HBA1C MFR BLD: 5.8 % (ref 4.8–5.9)
HCT VFR BLD AUTO: 38.1 % (ref 37–47)
HGB BLD-MCNC: 12 G/DL (ref 12–16)
LYMPHOCYTES # BLD: 1.9 K/UL (ref 1–4.8)
LYMPHOCYTES NFR BLD: 34.8 %
MCH RBC QN AUTO: 29.1 PG (ref 27–31.3)
MCHC RBC AUTO-ENTMCNC: 31.5 % (ref 33–37)
MCV RBC AUTO: 92.5 FL (ref 79.4–94.8)
MONOCYTES # BLD: 0.4 K/UL (ref 0.2–0.8)
MONOCYTES NFR BLD: 6.9 %
NEUTROPHILS # BLD: 3 K/UL (ref 1.4–6.5)
NEUTS SEG NFR BLD: 54.5 %
PLATELET # BLD AUTO: 248 K/UL (ref 130–400)
POTASSIUM SERPL-SCNC: 4.1 MEQ/L (ref 3.4–4.9)
PROT SERPL-MCNC: 6.6 G/DL (ref 6.3–8)
RBC # BLD AUTO: 4.12 M/UL (ref 4.2–5.4)
SODIUM SERPL-SCNC: 140 MEQ/L (ref 135–144)
TSH SERPL-MCNC: 5.54 UIU/ML (ref 0.44–3.86)
WBC # BLD AUTO: 5.5 K/UL (ref 4.8–10.8)

## 2024-03-19 PROCEDURE — 77063 BREAST TOMOSYNTHESIS BI: CPT

## 2024-03-19 RX ORDER — TIZANIDINE 4 MG/1
TABLET ORAL
Qty: 60 TABLET | Refills: 2 | OUTPATIENT
Start: 2024-03-19

## 2024-04-03 SDOH — ECONOMIC STABILITY: FOOD INSECURITY: WITHIN THE PAST 12 MONTHS, THE FOOD YOU BOUGHT JUST DIDN'T LAST AND YOU DIDN'T HAVE MONEY TO GET MORE.: NEVER TRUE

## 2024-04-03 SDOH — ECONOMIC STABILITY: TRANSPORTATION INSECURITY
IN THE PAST 12 MONTHS, HAS LACK OF TRANSPORTATION KEPT YOU FROM MEETINGS, WORK, OR FROM GETTING THINGS NEEDED FOR DAILY LIVING?: NO

## 2024-04-03 SDOH — ECONOMIC STABILITY: FOOD INSECURITY: WITHIN THE PAST 12 MONTHS, YOU WORRIED THAT YOUR FOOD WOULD RUN OUT BEFORE YOU GOT MONEY TO BUY MORE.: NEVER TRUE

## 2024-04-03 SDOH — ECONOMIC STABILITY: INCOME INSECURITY: HOW HARD IS IT FOR YOU TO PAY FOR THE VERY BASICS LIKE FOOD, HOUSING, MEDICAL CARE, AND HEATING?: NOT HARD AT ALL

## 2024-04-03 ASSESSMENT — PATIENT HEALTH QUESTIONNAIRE - PHQ9
9. THOUGHTS THAT YOU WOULD BE BETTER OFF DEAD, OR OF HURTING YOURSELF: NOT AT ALL
4. FEELING TIRED OR HAVING LITTLE ENERGY: SEVERAL DAYS
7. TROUBLE CONCENTRATING ON THINGS, SUCH AS READING THE NEWSPAPER OR WATCHING TELEVISION: SEVERAL DAYS
1. LITTLE INTEREST OR PLEASURE IN DOING THINGS: NOT AT ALL
10. IF YOU CHECKED OFF ANY PROBLEMS, HOW DIFFICULT HAVE THESE PROBLEMS MADE IT FOR YOU TO DO YOUR WORK, TAKE CARE OF THINGS AT HOME, OR GET ALONG WITH OTHER PEOPLE: SOMEWHAT DIFFICULT
6. FEELING BAD ABOUT YOURSELF - OR THAT YOU ARE A FAILURE OR HAVE LET YOURSELF OR YOUR FAMILY DOWN: NOT AT ALL
3. TROUBLE FALLING OR STAYING ASLEEP: SEVERAL DAYS
8. MOVING OR SPEAKING SO SLOWLY THAT OTHER PEOPLE COULD HAVE NOTICED. OR THE OPPOSITE, BEING SO FIGETY OR RESTLESS THAT YOU HAVE BEEN MOVING AROUND A LOT MORE THAN USUAL: NOT AT ALL
SUM OF ALL RESPONSES TO PHQ QUESTIONS 1-9: 4
10. IF YOU CHECKED OFF ANY PROBLEMS, HOW DIFFICULT HAVE THESE PROBLEMS MADE IT FOR YOU TO DO YOUR WORK, TAKE CARE OF THINGS AT HOME, OR GET ALONG WITH OTHER PEOPLE: SOMEWHAT DIFFICULT
1. LITTLE INTEREST OR PLEASURE IN DOING THINGS: NOT AT ALL
5. POOR APPETITE OR OVEREATING: SEVERAL DAYS
8. MOVING OR SPEAKING SO SLOWLY THAT OTHER PEOPLE COULD HAVE NOTICED. OR THE OPPOSITE - BEING SO FIDGETY OR RESTLESS THAT YOU HAVE BEEN MOVING AROUND A LOT MORE THAN USUAL: NOT AT ALL
3. TROUBLE FALLING OR STAYING ASLEEP: SEVERAL DAYS
9. THOUGHTS THAT YOU WOULD BE BETTER OFF DEAD, OR OF HURTING YOURSELF: NOT AT ALL
SUM OF ALL RESPONSES TO PHQ QUESTIONS 1-9: 4
2. FEELING DOWN, DEPRESSED OR HOPELESS: NOT AT ALL
6. FEELING BAD ABOUT YOURSELF - OR THAT YOU ARE A FAILURE OR HAVE LET YOURSELF OR YOUR FAMILY DOWN: NOT AT ALL
7. TROUBLE CONCENTRATING ON THINGS, SUCH AS READING THE NEWSPAPER OR WATCHING TELEVISION: SEVERAL DAYS
2. FEELING DOWN, DEPRESSED OR HOPELESS: NOT AT ALL
SUM OF ALL RESPONSES TO PHQ9 QUESTIONS 1 & 2: 0
4. FEELING TIRED OR HAVING LITTLE ENERGY: SEVERAL DAYS
5. POOR APPETITE OR OVEREATING: SEVERAL DAYS

## 2024-04-05 ENCOUNTER — OFFICE VISIT (OUTPATIENT)
Dept: FAMILY MEDICINE CLINIC | Age: 64
End: 2024-04-05
Payer: COMMERCIAL

## 2024-04-05 VITALS
BODY MASS INDEX: 29.02 KG/M2 | SYSTOLIC BLOOD PRESSURE: 128 MMHG | OXYGEN SATURATION: 97 % | HEART RATE: 72 BPM | RESPIRATION RATE: 16 BRPM | WEIGHT: 170 LBS | TEMPERATURE: 97.8 F | HEIGHT: 64 IN | DIASTOLIC BLOOD PRESSURE: 86 MMHG

## 2024-04-05 DIAGNOSIS — E03.9 HYPOTHYROIDISM, UNSPECIFIED TYPE: Primary | ICD-10-CM

## 2024-04-05 DIAGNOSIS — R00.2 PALPITATIONS: ICD-10-CM

## 2024-04-05 DIAGNOSIS — E03.9 HYPOTHYROIDISM, UNSPECIFIED TYPE: ICD-10-CM

## 2024-04-05 PROCEDURE — 93000 ELECTROCARDIOGRAM COMPLETE: CPT | Performed by: NURSE PRACTITIONER

## 2024-04-05 PROCEDURE — 99214 OFFICE O/P EST MOD 30 MIN: CPT | Performed by: NURSE PRACTITIONER

## 2024-04-05 RX ORDER — LEVOTHYROXINE SODIUM 88 UG/1
88 TABLET ORAL DAILY
Qty: 30 TABLET | Refills: 2 | Status: SHIPPED | OUTPATIENT
Start: 2024-04-05 | End: 2024-04-05

## 2024-04-05 RX ORDER — LEVOTHYROXINE SODIUM 88 UG/1
88 TABLET ORAL DAILY
Qty: 90 TABLET | Refills: 0 | Status: SHIPPED | OUTPATIENT
Start: 2024-04-05

## 2024-04-05 ASSESSMENT — ENCOUNTER SYMPTOMS
VOMITING: 0
NAUSEA: 0
SORE THROAT: 0
ABDOMINAL PAIN: 0
TROUBLE SWALLOWING: 0
CONSTIPATION: 0
SHORTNESS OF BREATH: 0
COUGH: 0
EYES NEGATIVE: 1
DIARRHEA: 0
CHEST TIGHTNESS: 0
WHEEZING: 0
BLOOD IN STOOL: 0

## 2024-04-05 NOTE — PROGRESS NOTES
synthroid adjustment  If so will order holter monitor    Orders Placed This Encounter   Procedures    TSH     Standing Status:   Future     Standing Expiration Date:   4/5/2025    T4, Free     Standing Status:   Future     Standing Expiration Date:   4/5/2025    EKG 12 Lead     Order Specific Question:   Reason for Exam?     Answer:   Irregular heart rate     Comments:   flutters     Orders Placed This Encounter   Medications    levothyroxine (SYNTHROID) 88 MCG tablet     Sig: Take 1 tablet by mouth daily     Dispense:  30 tablet     Refill:  2     Medications Discontinued During This Encounter   Medication Reason    sodium-potassium-mag sulfate (SUPREP) 17.5-3.13-1.6 GM/177ML SOLN solution     levothyroxine (SYNTHROID) 75 MCG tablet REORDER     Return in about 6 months (around 10/5/2024).      Reviewed with the patient: currentclinical status, medications, activities and diet.     Side effects, adverse effects of the medicationprescribed today, as well as treatment plan/ rationale and result expectations havebeen discussed with the patient who expresses understanding and desires to proceed.Pt instructions reviewed and given to patient.     Close follow up to evaluate treatment resultsand for coordination of care.  I have reviewed the patient's medical historyin detail and updated the computerized patient record.    Sharyn Engel, APRN - CNP

## 2024-05-08 ENCOUNTER — OFFICE VISIT (OUTPATIENT)
Dept: PAIN MANAGEMENT | Age: 64
End: 2024-05-08
Payer: COMMERCIAL

## 2024-05-08 VITALS
SYSTOLIC BLOOD PRESSURE: 122 MMHG | BODY MASS INDEX: 28 KG/M2 | DIASTOLIC BLOOD PRESSURE: 76 MMHG | WEIGHT: 164 LBS | TEMPERATURE: 97.5 F | HEIGHT: 64 IN

## 2024-05-08 DIAGNOSIS — M51.26 LUMBAR HERNIATED DISC: Primary | ICD-10-CM

## 2024-05-08 DIAGNOSIS — M51.36 DDD (DEGENERATIVE DISC DISEASE), LUMBAR: ICD-10-CM

## 2024-05-08 DIAGNOSIS — M79.645 PAIN OF LEFT THUMB: ICD-10-CM

## 2024-05-08 DIAGNOSIS — M54.31 SCIATICA OF RIGHT SIDE: ICD-10-CM

## 2024-05-08 DIAGNOSIS — M54.16 LUMBAR RADICULOPATHY: ICD-10-CM

## 2024-05-08 PROCEDURE — 99214 OFFICE O/P EST MOD 30 MIN: CPT | Performed by: PAIN MEDICINE

## 2024-05-08 ASSESSMENT — ENCOUNTER SYMPTOMS
NAUSEA: 0
DIARRHEA: 0
CONSTIPATION: 0
SHORTNESS OF BREATH: 0

## 2024-05-08 NOTE — PROGRESS NOTES
[facility]  [facility]  [address], [address]  [place], [place]  P: [phone]  F: [phone]        Monika Maddox  (1960)    5/8/2024    Subjective:     Monika Maddox is 64 y.o. female who complains today of:    Chief Complaint   Patient presents with    Other     Left thumb pain       HPI      She is complaining of right hip/buttock pain that goes int the alteral thigh. It is occasional numb at times after prolonged standing.  It has been present for 4 weeks where is was severe, but has had for months prior to that. She has done full course of PT in the past. She has been doing the HEP, and it is not helping.     Pain is described as throbbing, aching, stabbing, sharp, and tingling.  Pain level today is rated 5 on a 10 point scale.  It is severe in nature.  With pain medication (Ibuprofen, Tylenol), pain level drops to a 5/10.  Without pain medication, pain level can escalate upto a 10/10. Pain is worse and more noticeable at night.   Pain is affecting the patients ability to perform activities of daily living especially with regards to personal hygiene, household chores and self care.  With pain medication, the patient is better able to perform ADLs.  Pain in part is secondary to osteoarthritis of the spine.  Pain is aggravated by  laying in bed at night, especially if her neck is in a certain position.   Pain is alleviated by rest and medication.  PT not helpful in the past.   Prior Injections:  SHe has done facet and MBB injections for the cervical spine. No relief.   Prior medications: NSAIDs and analgesics  Prior spine surgeries:  none  Pain is not is associated with fevers/chills/night sweats.  Bowel and bladder are working appropriately.    Today patient is complaining of left thumb pain that gets stuck.     Neck pain is improved s/p LEEANNE injections.     Allergies:  Patient has no known allergies.    Past Medical

## 2024-05-22 ENCOUNTER — TELEPHONE (OUTPATIENT)
Dept: PAIN MANAGEMENT | Age: 64
End: 2024-05-22

## 2024-05-22 NOTE — TELEPHONE ENCOUNTER
RIGHT L4-5 TFESI    NO AUTH REQUIRED  TO BE SCHEDULED IN [facility] to schedule procedure approved as above.   Please note sides/levels approved and date range.   (If applicable, sides/levels approved may differ from those ordered)    TO BE SCHEDULED WITH

## 2024-06-12 ENCOUNTER — HOSPITAL ENCOUNTER (OUTPATIENT)
Age: 64
Discharge: HOME | End: 2024-06-12
Payer: COMMERCIAL

## 2024-06-12 VITALS
HEART RATE: 63 BPM | DIASTOLIC BLOOD PRESSURE: 72 MMHG | SYSTOLIC BLOOD PRESSURE: 122 MMHG | BODY MASS INDEX: 29.02 KG/M2 | HEIGHT: 64 IN | WEIGHT: 170 LBS | OXYGEN SATURATION: 96 %

## 2024-06-12 DIAGNOSIS — G89.29 CHRONIC LOW BACK PAIN, UNSPECIFIED BACK PAIN LATERALITY, UNSPECIFIED WHETHER SCIATICA PRESENT: ICD-10-CM

## 2024-06-12 DIAGNOSIS — M54.50 CHRONIC LOW BACK PAIN, UNSPECIFIED BACK PAIN LATERALITY, UNSPECIFIED WHETHER SCIATICA PRESENT: ICD-10-CM

## 2024-06-12 DIAGNOSIS — M54.16 LUMBAR RADICULOPATHY: Primary | ICD-10-CM

## 2024-06-12 PROCEDURE — 64483 NJX AA&/STRD TFRM EPI L/S 1: CPT | Performed by: PAIN MEDICINE

## 2024-06-12 PROCEDURE — 6360000002 HC RX W HCPCS: Performed by: PAIN MEDICINE

## 2024-06-12 PROCEDURE — 2580000003 HC RX 258: Performed by: PAIN MEDICINE

## 2024-06-12 PROCEDURE — 6360000004 HC RX CONTRAST MEDICATION: Performed by: PAIN MEDICINE

## 2024-06-12 RX ORDER — SODIUM CHLORIDE 9 MG/ML
1 INJECTION, SOLUTION INTRAMUSCULAR; INTRAVENOUS; SUBCUTANEOUS 2 TIMES DAILY
Status: DISCONTINUED | OUTPATIENT
Start: 2024-06-12 | End: 2024-06-13 | Stop reason: HOSPADM

## 2024-06-12 RX ORDER — DEXAMETHASONE SODIUM PHOSPHATE 10 MG/ML
5 INJECTION, SOLUTION INTRAMUSCULAR; INTRAVENOUS ONCE
Status: COMPLETED | OUTPATIENT
Start: 2024-06-12 | End: 2024-06-12

## 2024-06-12 RX ADMIN — SODIUM CHLORIDE 1 ML: 9 INJECTION, SOLUTION INTRAMUSCULAR; INTRAVENOUS; SUBCUTANEOUS at 09:41

## 2024-06-12 RX ADMIN — IOHEXOL 1 ML: 300 INJECTION, SOLUTION INTRAVENOUS at 09:40

## 2024-06-12 RX ADMIN — DEXAMETHASONE SODIUM PHOSPHATE 5 MG: 10 INJECTION, SOLUTION INTRAMUSCULAR; INTRAVENOUS at 09:39

## 2024-06-12 NOTE — PROGRESS NOTES
LUMBAR TRANSFORAMINAL INJECTION      Patient Name: Monika Maddox : 1960  Date: 2024   Physician: KENYETTA MORENO MD      Monika Maddox  is here today for interventional pain management.    Preoperatively, the patient presents with radicular pain in the affected area as per history and exam. Patient has failed NSAIDs, PT/HEP, and conservative treatment. Patient has significant psychological and functional impairment due to this condition.  Standard ASIPP guidelines were followed and sterile technique used.  Area was cleaned with Betadine x3.  Informed consent was obtained.  Fluoroscopic guidance was used for this procedure.     Discussed the risks including but not limited to bleeding, infection, worsened pain, damage to surrounding structures, side effects, toxicity, allergic reactions to medications used, need for surgery, pneumothorax, abdominal and visceral injury, as well as catastrophic injury such as vision loss, paralysis, spinal cord or plexus injury, stroke, bowel or bladder incontinence, chest tube insertion, ventilator dependence, and death. Discussed the risks, benefits, and alternatives to the procedure including no procedure at all. Discussed that we cannot undo any permanent neurologic or orthopaedic damage or change the course of any underlying disease. After thorough discussion, patient expressed understanding and willingness to proceed. Written consent was obtained and is in the chart. Verbal consent to proceed was obtained.    TRANSFORAMINAL EPIDURAL  There was appropriate spread of contrast in the anterior epidural space and around the exiting nerve root. The 6 o’clock position of the pedicle was identified. Multiple views of fluoroscopy including lateral were used to confirm accurate needle placement of depth. Live fluoroscopy was used when injecting contrast to ensure no subdural or vascular spread. In total, approximately 5 mg of Dexamethasone

## 2024-07-05 RX ORDER — ROSUVASTATIN CALCIUM 40 MG/1
40 TABLET, COATED ORAL DAILY
Qty: 90 TABLET | OUTPATIENT
Start: 2024-07-05

## 2024-07-05 RX ORDER — ROSUVASTATIN CALCIUM 40 MG/1
40 TABLET, COATED ORAL DAILY
Qty: 30 TABLET | Refills: 0 | Status: SHIPPED | OUTPATIENT
Start: 2024-07-05

## 2024-07-05 NOTE — TELEPHONE ENCOUNTER
Please approve or deny request. Thank you!    Rx requested:  Requested Prescriptions     Pending Prescriptions Disp Refills    rosuvastatin (CRESTOR) 40 MG tablet [Pharmacy Med Name: ROSUVASTATIN 40MG TABLETS] 90 tablet 0     Sig: TAKE 1 TABLET BY MOUTH DAILY         Last Office Visit:   Visit date not found      Next Visit Date:  Future Appointments   Date Time Provider Department Center   10/2/2024  8:00 AM Sharyn Engel, ROSIBEL - CNP [facility]

## 2024-07-31 ENCOUNTER — APPOINTMENT (OUTPATIENT)
Dept: PRIMARY CARE | Facility: CLINIC | Age: 64
End: 2024-07-31

## 2024-08-13 ENCOUNTER — APPOINTMENT (OUTPATIENT)
Dept: PRIMARY CARE | Facility: CLINIC | Age: 64
End: 2024-08-13

## 2024-08-13 VITALS
BODY MASS INDEX: 32.78 KG/M2 | WEIGHT: 185 LBS | HEART RATE: 64 BPM | DIASTOLIC BLOOD PRESSURE: 76 MMHG | SYSTOLIC BLOOD PRESSURE: 128 MMHG | TEMPERATURE: 97.9 F | HEIGHT: 63 IN

## 2024-08-13 DIAGNOSIS — E78.2 MIXED HYPERLIPIDEMIA: ICD-10-CM

## 2024-08-13 DIAGNOSIS — R06.02 SOB (SHORTNESS OF BREATH) ON EXERTION: Primary | ICD-10-CM

## 2024-08-13 DIAGNOSIS — E03.9 HYPOTHYROIDISM, UNSPECIFIED TYPE: ICD-10-CM

## 2024-08-13 DIAGNOSIS — M19.049 HAND ARTHRITIS: ICD-10-CM

## 2024-08-13 DIAGNOSIS — R53.83 FATIGUE, UNSPECIFIED TYPE: ICD-10-CM

## 2024-08-13 PROCEDURE — 3008F BODY MASS INDEX DOCD: CPT | Performed by: INTERNAL MEDICINE

## 2024-08-13 PROCEDURE — 99204 OFFICE O/P NEW MOD 45 MIN: CPT | Performed by: INTERNAL MEDICINE

## 2024-08-13 RX ORDER — LEVOTHYROXINE SODIUM 88 UG/1
1 TABLET ORAL
COMMUNITY
Start: 2024-06-17

## 2024-08-13 RX ORDER — OMEPRAZOLE 40 MG/1
40 CAPSULE, DELAYED RELEASE ORAL DAILY
COMMUNITY
Start: 2024-08-04

## 2024-08-13 RX ORDER — ESCITALOPRAM OXALATE 20 MG/1
20 TABLET ORAL
COMMUNITY
Start: 2024-06-04

## 2024-08-13 RX ORDER — TIZANIDINE 4 MG/1
4 TABLET ORAL EVERY 8 HOURS PRN
COMMUNITY
Start: 2024-02-22

## 2024-08-13 RX ORDER — CHOLECALCIFEROL (VITAMIN D3) 125 MCG
125 CAPSULE ORAL DAILY
COMMUNITY

## 2024-08-13 RX ORDER — SODIUM, POTASSIUM,MAG SULFATES 17.5-3.13G
SOLUTION, RECONSTITUTED, ORAL ORAL
COMMUNITY
Start: 2023-11-13

## 2024-08-13 RX ORDER — ACETAMINOPHEN, DEXTROMETHORPHAN HBR, DOXYLAMINE SUCCINATE, PHENYLEPHRINE HCL 650; 20; 12.5; 1 MG/30ML; MG/30ML; MG/30ML; MG/30ML
1 SOLUTION ORAL DAILY
COMMUNITY

## 2024-08-13 RX ORDER — ROSUVASTATIN CALCIUM 40 MG/1
1 TABLET, COATED ORAL
COMMUNITY
Start: 2024-07-05

## 2024-08-13 ASSESSMENT — ENCOUNTER SYMPTOMS
ENDOCRINE NEGATIVE: 1
SHORTNESS OF BREATH: 1
EYES NEGATIVE: 1
CONSTITUTIONAL NEGATIVE: 1
CARDIOVASCULAR NEGATIVE: 1
GASTROINTESTINAL NEGATIVE: 1
HEMATOLOGIC/LYMPHATIC NEGATIVE: 1
NEUROLOGICAL NEGATIVE: 1
PSYCHIATRIC NEGATIVE: 1

## 2024-08-13 NOTE — PROGRESS NOTES
"Subjective   Patient ID: Kaylee Puentes is a 64 y.o. female who presents for Establish Care (Pt here to establish  fibromyalgia, family history CHF, heart disease, diabetes).    HPI patient is here to get established she has history of fibromyalgia family history of CHF heart disease diabetes she also has history of anxiety and hypothyroidism hyperlipidemia she is a smoker but smokes 3 to 5 cigarettes daily.    Review of Systems   Constitutional: Negative.    HENT: Negative.     Eyes: Negative.    Respiratory:  Positive for shortness of breath.    Cardiovascular: Negative.    Gastrointestinal: Negative.    Endocrine: Negative.    Genitourinary: Negative.    Musculoskeletal:         Hand arthritis   Neurological: Negative.    Hematological: Negative.    Psychiatric/Behavioral: Negative.     All other systems reviewed and are negative.      Objective   /76   Pulse 64   Temp 36.6 °C (97.9 °F) (Temporal)   Ht 1.6 m (5' 3\")   Wt 83.9 kg (185 lb)   BMI 32.77 kg/m²     Physical Exam  Vitals reviewed.   Constitutional:       Appearance: Normal appearance.   HENT:      Head: Normocephalic.   Eyes:      Pupils: Pupils are equal, round, and reactive to light.   Cardiovascular:      Rate and Rhythm: Normal rate and regular rhythm.   Pulmonary:      Effort: Pulmonary effort is normal.      Breath sounds: Normal breath sounds.   Musculoskeletal:      Right lower leg: No edema.      Left lower leg: No edema.   Lymphadenopathy:      Cervical: No cervical adenopathy.   Neurological:      General: No focal deficit present.      Mental Status: She is alert and oriented to person, place, and time.      Cranial Nerves: No cranial nerve deficit.   Psychiatric:         Mood and Affect: Mood normal.         Behavior: Behavior normal.         Thought Content: Thought content normal.         Assessment/Plan   Problem List Items Addressed This Visit             ICD-10-CM    Hand arthritis M19.049    Relevant Orders    CIRILO with " Reflex to RAY    Rheumatoid factor    Sedimentation Rate    C-reactive protein    Hypothyroidism E03.9    Relevant Orders    TSH with reflex to Free T4 if abnormal    SOB (shortness of breath) on exertion - Primary R06.02    Relevant Orders    Referral to Invasive Interventional Cardiology    XR chest 2 views    Fatigue R53.83    Relevant Orders    CBC and Auto Differential    Mixed hyperlipidemia E78.2    Relevant Orders    Cholesterol, LDL Direct    Comprehensive Metabolic Panel    Lipid Panel     Patient advised about smoking cessation she will be referred to cardiology to rule out occlusive CAD.  She will need echo and nuclear stress test.  If that workup is negative we will pursue pulmonary workup with pulmonary function test.  We will also rule out do the workup for rheumatoid arthritis and autoimmune diseases.  She should have colonoscopy done if she never had it done before or did not have it done in last 10 years.  We also recommend annual mammograms for breast cancer screening.

## 2024-08-18 DIAGNOSIS — E03.9 HYPOTHYROIDISM, UNSPECIFIED TYPE: ICD-10-CM

## 2024-08-19 RX ORDER — LEVOTHYROXINE SODIUM 88 UG/1
88 TABLET ORAL DAILY
Qty: 90 TABLET | Refills: 0 | OUTPATIENT
Start: 2024-08-19

## 2024-08-19 RX ORDER — LEVOTHYROXINE SODIUM 88 UG/1
88 TABLET ORAL DAILY
Qty: 90 TABLET | Refills: 0 | Status: SHIPPED | OUTPATIENT
Start: 2024-08-19

## 2024-08-22 ENCOUNTER — HOSPITAL ENCOUNTER (OUTPATIENT)
Dept: RADIOLOGY | Facility: HOSPITAL | Age: 64
Discharge: HOME | End: 2024-08-22
Payer: COMMERCIAL

## 2024-08-22 DIAGNOSIS — R06.02 SOB (SHORTNESS OF BREATH) ON EXERTION: ICD-10-CM

## 2024-08-22 PROCEDURE — 71046 X-RAY EXAM CHEST 2 VIEWS: CPT

## 2024-09-03 ENCOUNTER — LAB (OUTPATIENT)
Dept: LAB | Facility: LAB | Age: 64
End: 2024-09-03
Payer: COMMERCIAL

## 2024-09-03 ENCOUNTER — APPOINTMENT (OUTPATIENT)
Dept: CARDIOLOGY | Facility: CLINIC | Age: 64
End: 2024-09-03
Payer: COMMERCIAL

## 2024-09-03 VITALS
SYSTOLIC BLOOD PRESSURE: 122 MMHG | BODY MASS INDEX: 31.47 KG/M2 | WEIGHT: 184.3 LBS | HEART RATE: 66 BPM | HEIGHT: 64 IN | DIASTOLIC BLOOD PRESSURE: 74 MMHG

## 2024-09-03 DIAGNOSIS — E03.9 HYPOTHYROIDISM, UNSPECIFIED TYPE: ICD-10-CM

## 2024-09-03 DIAGNOSIS — M19.049 HAND ARTHRITIS: ICD-10-CM

## 2024-09-03 DIAGNOSIS — R06.02 SOB (SHORTNESS OF BREATH) ON EXERTION: ICD-10-CM

## 2024-09-03 DIAGNOSIS — R07.89 OTHER CHEST PAIN: ICD-10-CM

## 2024-09-03 DIAGNOSIS — R53.83 OTHER FATIGUE: ICD-10-CM

## 2024-09-03 DIAGNOSIS — Z76.89 ENCOUNTER TO ESTABLISH CARE WITH NEW DOCTOR: ICD-10-CM

## 2024-09-03 DIAGNOSIS — E78.2 MIXED HYPERLIPIDEMIA: ICD-10-CM

## 2024-09-03 DIAGNOSIS — Z76.89 ENCOUNTER TO ESTABLISH CARE: ICD-10-CM

## 2024-09-03 DIAGNOSIS — Z82.49 FAMILY HISTORY OF ISCHEMIC HEART DISEASE: ICD-10-CM

## 2024-09-03 DIAGNOSIS — R53.83 FATIGUE, UNSPECIFIED TYPE: ICD-10-CM

## 2024-09-03 LAB
ALBUMIN SERPL BCP-MCNC: 4.4 G/DL (ref 3.4–5)
ALP SERPL-CCNC: 87 U/L (ref 33–136)
ALT SERPL W P-5'-P-CCNC: 16 U/L (ref 7–45)
ANION GAP SERPL CALC-SCNC: 12 MMOL/L (ref 10–20)
AST SERPL W P-5'-P-CCNC: 16 U/L (ref 9–39)
BASOPHILS # BLD AUTO: 0.06 X10*3/UL (ref 0–0.1)
BASOPHILS NFR BLD AUTO: 1 %
BILIRUB SERPL-MCNC: 0.6 MG/DL (ref 0–1.2)
BUN SERPL-MCNC: 17 MG/DL (ref 6–23)
CALCIUM SERPL-MCNC: 9.3 MG/DL (ref 8.6–10.3)
CHLORIDE SERPL-SCNC: 103 MMOL/L (ref 98–107)
CHOLEST SERPL-MCNC: 218 MG/DL (ref 0–199)
CHOLESTEROL/HDL RATIO: 4
CO2 SERPL-SCNC: 28 MMOL/L (ref 21–32)
CREAT SERPL-MCNC: 0.94 MG/DL (ref 0.5–1.05)
CRP SERPL-MCNC: 0.4 MG/DL
EGFRCR SERPLBLD CKD-EPI 2021: 68 ML/MIN/1.73M*2
EOSINOPHIL # BLD AUTO: 0.09 X10*3/UL (ref 0–0.7)
EOSINOPHIL NFR BLD AUTO: 1.6 %
ERYTHROCYTE [DISTWIDTH] IN BLOOD BY AUTOMATED COUNT: 13.6 % (ref 11.5–14.5)
ERYTHROCYTE [SEDIMENTATION RATE] IN BLOOD BY WESTERGREN METHOD: 15 MM/H (ref 0–30)
GLUCOSE SERPL-MCNC: 103 MG/DL (ref 74–99)
HCT VFR BLD AUTO: 41 % (ref 36–46)
HDLC SERPL-MCNC: 54.9 MG/DL
HGB BLD-MCNC: 13.1 G/DL (ref 12–16)
IMM GRANULOCYTES # BLD AUTO: 0.02 X10*3/UL (ref 0–0.7)
IMM GRANULOCYTES NFR BLD AUTO: 0.3 % (ref 0–0.9)
LDLC SERPL CALC-MCNC: 127 MG/DL
LDLC SERPL DIRECT ASSAY-MCNC: 139 MG/DL (ref 0–129)
LYMPHOCYTES # BLD AUTO: 1.8 X10*3/UL (ref 1.2–4.8)
LYMPHOCYTES NFR BLD AUTO: 31.2 %
MCH RBC QN AUTO: 29.7 PG (ref 26–34)
MCHC RBC AUTO-ENTMCNC: 32 G/DL (ref 32–36)
MCV RBC AUTO: 93 FL (ref 80–100)
MONOCYTES # BLD AUTO: 0.32 X10*3/UL (ref 0.1–1)
MONOCYTES NFR BLD AUTO: 5.5 %
NEUTROPHILS # BLD AUTO: 3.48 X10*3/UL (ref 1.2–7.7)
NEUTROPHILS NFR BLD AUTO: 60.4 %
NON HDL CHOLESTEROL: 163 MG/DL (ref 0–149)
NRBC BLD-RTO: 0 /100 WBCS (ref 0–0)
PLATELET # BLD AUTO: 274 X10*3/UL (ref 150–450)
POTASSIUM SERPL-SCNC: 4.6 MMOL/L (ref 3.5–5.3)
PROT SERPL-MCNC: 6.9 G/DL (ref 6.4–8.2)
RBC # BLD AUTO: 4.41 X10*6/UL (ref 4–5.2)
RHEUMATOID FACT SER NEPH-ACNC: <10 IU/ML (ref 0–15)
SODIUM SERPL-SCNC: 138 MMOL/L (ref 136–145)
TRIGL SERPL-MCNC: 180 MG/DL (ref 0–149)
TSH SERPL-ACNC: 3.91 MIU/L (ref 0.44–3.98)
VLDL: 36 MG/DL (ref 0–40)
WBC # BLD AUTO: 5.8 X10*3/UL (ref 4.4–11.3)

## 2024-09-03 PROCEDURE — 93000 ELECTROCARDIOGRAM COMPLETE: CPT | Performed by: INTERNAL MEDICINE

## 2024-09-03 PROCEDURE — 3008F BODY MASS INDEX DOCD: CPT | Performed by: INTERNAL MEDICINE

## 2024-09-03 PROCEDURE — 86140 C-REACTIVE PROTEIN: CPT

## 2024-09-03 PROCEDURE — 36415 COLL VENOUS BLD VENIPUNCTURE: CPT

## 2024-09-03 PROCEDURE — 83721 ASSAY OF BLOOD LIPOPROTEIN: CPT

## 2024-09-03 PROCEDURE — 85652 RBC SED RATE AUTOMATED: CPT

## 2024-09-03 PROCEDURE — 80053 COMPREHEN METABOLIC PANEL: CPT

## 2024-09-03 PROCEDURE — 86431 RHEUMATOID FACTOR QUANT: CPT

## 2024-09-03 PROCEDURE — 80061 LIPID PANEL: CPT

## 2024-09-03 PROCEDURE — 99204 OFFICE O/P NEW MOD 45 MIN: CPT | Performed by: INTERNAL MEDICINE

## 2024-09-03 PROCEDURE — 84443 ASSAY THYROID STIM HORMONE: CPT

## 2024-09-03 PROCEDURE — 86038 ANTINUCLEAR ANTIBODIES: CPT

## 2024-09-03 PROCEDURE — 85025 COMPLETE CBC W/AUTO DIFF WBC: CPT

## 2024-09-03 NOTE — PATIENT INSTRUCTIONS
Patient to follow up after testing with Dr. Thuy Verma MD [facility]     Office will arrange Nuclear Lexiscan Stress and Echo in near future.     Encouraged to quit smoking- heart healthy education given today.      No other changes today.   Continue same medications and treatments.   Patient educated on proper medication use.   Patient educated on risk factor modification.   Please bring any lab results from other providers / physicians to your next appointment.     Please bring all medicines, vitamins, and herbal supplements with you when you come to the office.     Prescriptions will not be filled unless you are compliant with your follow up appointments or have a follow up appointment scheduled as per instruction of your physician. Refills should be requested at the time of your visit.    Vito CHAVIRA RN am scribing for and in the presence of Dr. Thuy Verma MD [facility]

## 2024-09-03 NOTE — PROGRESS NOTES
Referred by Kingston Sutherland,* provider found for   Chief Complaint   Patient presents with    New Patient Visit     C/O SOB on exertion.  LOV 4/2/2013 with Dr. Killian        History of Present Illness  Kaylee Puentes is a 64 y.o. year old female patient is here for cardiac evaluation.  She had history of tobacco abuse.  History of hyperlipidemia no significant family history of premature coronary disease.  EKG shows sinus rhythm and no acute changes.  The patient stated that she have increasing episode of shortness of breath.  She did have a CT of the chest but was normal.  About cardiac status.  Unable to do a treadmill exercise stress test because of degenerative joint disease.  Stated that she get out of breath after walking for less than half a block.  Did not have any syncope or presyncope but she did have episodic palpitation and some heaviness in the chest and chest pain with exertion.  I discussed with the patient in length that we need to rule out cardiac etiology of her symptoms we will obtain echocardiogram and Lexiscan stress test and based on results further recommendation will be made    Past Medical History  Past Medical History:   Diagnosis Date    Personal history of other diseases of the digestive system 05/10/2017    History of esophageal reflux    Personal history of other endocrine, nutritional and metabolic disease 05/10/2017    History of hypothyroidism    Personal history of other mental and behavioral disorders 05/10/2017    History of depression    Personal history of other mental and behavioral disorders 05/10/2017    History of anxiety disorder       Social History  Social History     Tobacco Use    Smoking status: Every Day     Types: Cigarettes    Smokeless tobacco: Never    Tobacco comments:     Currently 2 cigarettes a day   Substance Use Topics    Alcohol use: Never    Drug use: Yes     Types: Marijuana     Comment: gummies occasionally       Family History      Family History   Problem Relation Name Age of Onset    Stroke Mother      Heart failure Mother      Hypertension Mother      Hypertension Brother      Diabetes Brother      Lymphoma Son         Review of Systems  As per HPI, all other systems reviewed and negative.    Allergies:  No Known Allergies     Outpatient Medications:  Current Outpatient Medications   Medication Instructions    cholecalciferol (VITAMIN D-3) 125 mcg, oral, Daily    cyanocobalamin, vitamin B-12, (Vitamin B-12) 1,000 mcg tablet extended release 1 tablet, oral, Daily    escitalopram (LEXAPRO) 20 mg, oral, Daily (0630)    levothyroxine (Synthroid, Levoxyl) 88 mcg tablet 1 tablet, oral, Daily (0630)    omeprazole (PRILOSEC) 40 mg, oral, Daily    rosuvastatin (Crestor) 40 mg tablet 1 tablet, oral, 2 times a week    sodium,potassium,mag sulfates (Suprep) 17.5-3.13-1.6 gram recon soln solution MIX AND DRINK AS DIRECTED    tiZANidine (ZANAFLEX) 4 mg, oral, Every 8 hours PRN         Vitals:  Vitals:    09/03/24 0812   BP: 122/74   Pulse: 66       Physical Exam:  Physical Exam  Vitals and nursing note reviewed.   Constitutional:       Appearance: Normal appearance. She is normal weight.   HENT:      Head: Normocephalic and atraumatic.   Eyes:      Extraocular Movements: Extraocular movements intact.      Pupils: Pupils are equal, round, and reactive to light.   Cardiovascular:      Rate and Rhythm: Normal rate and regular rhythm.      Pulses: Normal pulses.   Pulmonary:      Effort: Pulmonary effort is normal.      Breath sounds: Normal breath sounds.   Musculoskeletal:      Cervical back: Normal range of motion.      Right lower leg: No edema.      Left lower leg: No edema.   Skin:     General: Skin is warm and dry.   Neurological:      General: No focal deficit present.      Mental Status: She is alert and oriented to person, place, and time.             Assessment/Plan   Diagnoses and all orders for this visit:  SOB (shortness of breath) on  exertion  -     Referral to Invasive Interventional Cardiology  Encounter to establish care  Other fatigue  Mixed hyperlipidemia  Encounter to establish care with new doctor  Other chest pain  BMI 32.0-32.9,adult          Thuy Verma MD [facility]  Interventional Cardiology   of [facility]     Thank you for allowing me to participate in the care of this patient. Please do not hesitate to contact me with any further questions or concerns.

## 2024-09-04 LAB — ANA SER QL HEP2 SUBST: NEGATIVE

## 2024-09-26 ENCOUNTER — APPOINTMENT (OUTPATIENT)
Dept: CARDIOLOGY | Facility: CLINIC | Age: 64
End: 2024-09-26
Payer: COMMERCIAL

## 2024-09-26 ENCOUNTER — APPOINTMENT (OUTPATIENT)
Dept: RADIOLOGY | Facility: CLINIC | Age: 64
End: 2024-09-26
Payer: COMMERCIAL

## 2024-10-03 ENCOUNTER — APPOINTMENT (OUTPATIENT)
Dept: CARDIOLOGY | Facility: CLINIC | Age: 64
End: 2024-10-03
Payer: COMMERCIAL

## 2024-10-08 ENCOUNTER — OFFICE VISIT (OUTPATIENT)
Dept: FAMILY MEDICINE CLINIC | Age: 64
End: 2024-10-08
Payer: COMMERCIAL

## 2024-10-08 VITALS
DIASTOLIC BLOOD PRESSURE: 78 MMHG | TEMPERATURE: 97.7 F | BODY MASS INDEX: 31.58 KG/M2 | OXYGEN SATURATION: 97 % | HEART RATE: 75 BPM | SYSTOLIC BLOOD PRESSURE: 134 MMHG | WEIGHT: 185 LBS | RESPIRATION RATE: 16 BRPM | HEIGHT: 64 IN

## 2024-10-08 DIAGNOSIS — F41.9 ANXIETY: Primary | ICD-10-CM

## 2024-10-08 DIAGNOSIS — F32.1 CURRENT MODERATE EPISODE OF MAJOR DEPRESSIVE DISORDER, UNSPECIFIED WHETHER RECURRENT (HCC): ICD-10-CM

## 2024-10-08 PROCEDURE — 99214 OFFICE O/P EST MOD 30 MIN: CPT | Performed by: NURSE PRACTITIONER

## 2024-10-08 RX ORDER — BUPROPION HYDROCHLORIDE 150 MG/1
150 TABLET ORAL EVERY MORNING
Qty: 30 TABLET | Refills: 3 | Status: SHIPPED | OUTPATIENT
Start: 2024-10-08

## 2024-10-08 RX ORDER — ALPRAZOLAM 0.25 MG
0.25 TABLET ORAL NIGHTLY PRN
Qty: 30 TABLET | Refills: 0 | Status: SHIPPED | OUTPATIENT
Start: 2024-10-08 | End: 2024-11-07

## 2024-10-08 NOTE — PROGRESS NOTES
Number of Visits Requested:   1     Orders Placed This Encounter   Medications    buPROPion (WELLBUTRIN XL) 150 MG extended release tablet     Sig: Take 1 tablet by mouth every morning     Dispense:  30 tablet     Refill:  3    ALPRAZolam (XANAX) 0.25 MG tablet     Sig: Take 1 tablet by mouth nightly as needed for Anxiety or Sleep for up to 30 days. Max Daily Amount: 0.25 mg     Dispense:  30 tablet     Refill:  0     There are no discontinued medications.  Return in about 4 weeks (around 11/5/2024).      Reviewed with the patient: currentclinical status, medications, activities and diet.     Side effects, adverse effects of the medicationprescribed today, as well as treatment plan/ rationale and result expectations havebeen discussed with the patient who expresses understanding and desires to proceed.Pt instructions reviewed and given to patient.     Close follow up to evaluate treatment resultsand for coordination of care.  I have reviewed the patient's medical historyin detail and updated the computerized patient record.    Sharyn Engel, APRN - CNP

## 2024-11-06 ENCOUNTER — OFFICE VISIT (OUTPATIENT)
Dept: FAMILY MEDICINE CLINIC | Age: 64
End: 2024-11-06
Payer: COMMERCIAL

## 2024-11-06 VITALS
RESPIRATION RATE: 16 BRPM | SYSTOLIC BLOOD PRESSURE: 110 MMHG | BODY MASS INDEX: 31.92 KG/M2 | HEIGHT: 64 IN | TEMPERATURE: 96.7 F | WEIGHT: 187 LBS | OXYGEN SATURATION: 96 % | HEART RATE: 72 BPM | DIASTOLIC BLOOD PRESSURE: 70 MMHG

## 2024-11-06 DIAGNOSIS — F32.1 CURRENT MODERATE EPISODE OF MAJOR DEPRESSIVE DISORDER, UNSPECIFIED WHETHER RECURRENT (HCC): Primary | ICD-10-CM

## 2024-11-06 DIAGNOSIS — F41.9 ANXIETY: ICD-10-CM

## 2024-11-06 PROCEDURE — 99214 OFFICE O/P EST MOD 30 MIN: CPT | Performed by: NURSE PRACTITIONER

## 2024-11-06 NOTE — PROGRESS NOTES
Monika Maddox (:  1960) is a 64 y.o. female, Established patient, here for evaluation of the following chief complaint(s):  Anxiety (Patient is here for a 4 week f/u on Stress/Anxiety. Patient was prescribed Wellbutrin and Xanax at her last visit with moderate relief. )          Subjective   History of Present Illness  The patient presents for a follow-up visit.    She reports an improvement in her condition, attributing it to the effectiveness of her current medication regimen. She is able to sleep well at night and has noticed an enhancement in her concentration levels at work. She has only required the use of Xanax on two occasions.    Past Medical History:   Diagnosis Date    Colon polyposis 2013    multiple on colonoscopy, do [id number]    Depression     Esophageal web 2013    Fibromyalgia 2011    GERD (gastroesophageal reflux disease)     Hyperlipidemia     Hypothyroidism     Macular degeneration     left eye    Normal nuclear stress test 2013     Past Surgical History:   Procedure Laterality Date     SECTION      CHOLECYSTECTOMY  2013    Lapchole with gram    COLONOSCOPY  2013    COLONOSCOPY N/A 2023    COLONOSCOPY DIAGNOSTIC performed by Jose Raul Freeman MD at [facility]    HERNIA REPAIR      HYSTERECTOMY (CERVIX STATUS UNKNOWN)      still with one ovary    OVARY REMOVAL Right     UPPER GASTROINTESTINAL ENDOSCOPY  2013    UPPER GASTROINTESTINAL ENDOSCOPY N/A 2023    EGD DIAGNOSTIC with dilation, polypectomy and biopsy performed by Jose Raul Freeman MD at [facility]    US BREAST FINE NEEDLE ASPIRATION Bilateral      Social History     Socioeconomic History    Marital status:      Spouse name: Not on file    Number of children: Not on file    Years of education: Not on file    Highest education level: Not on file   Occupational History    Occupation:      Employer: [employer]   Tobacco Use    Smoking

## 2024-11-15 DIAGNOSIS — E03.9 HYPOTHYROIDISM, UNSPECIFIED TYPE: ICD-10-CM

## 2024-11-18 RX ORDER — LEVOTHYROXINE SODIUM 88 UG/1
88 TABLET ORAL DAILY
Qty: 90 TABLET | Refills: 0 | Status: SHIPPED | OUTPATIENT
Start: 2024-11-18

## 2024-11-22 ENCOUNTER — APPOINTMENT (OUTPATIENT)
Dept: PRIMARY CARE | Facility: CLINIC | Age: 64
End: 2024-11-22
Payer: COMMERCIAL

## 2024-12-03 DIAGNOSIS — F32.1 CURRENT MODERATE EPISODE OF MAJOR DEPRESSIVE DISORDER, UNSPECIFIED WHETHER RECURRENT (HCC): ICD-10-CM

## 2024-12-03 RX ORDER — BUPROPION HYDROCHLORIDE 150 MG/1
150 TABLET ORAL EVERY MORNING
Qty: 90 TABLET | Refills: 1 | Status: SHIPPED | OUTPATIENT
Start: 2024-12-03

## 2025-01-10 DIAGNOSIS — M47.812 CERVICAL SPONDYLOSIS WITHOUT MYELOPATHY: ICD-10-CM

## 2025-01-10 DIAGNOSIS — N64.4 BREAST PAIN, LEFT: ICD-10-CM

## 2025-01-10 DIAGNOSIS — N63.20 MASS OF LEFT BREAST, UNSPECIFIED QUADRANT: Primary | ICD-10-CM

## 2025-02-06 DIAGNOSIS — F41.9 ANXIETY: ICD-10-CM

## 2025-02-07 RX ORDER — ESCITALOPRAM OXALATE 20 MG/1
TABLET ORAL
Qty: 135 TABLET | Refills: 3 | Status: SHIPPED | OUTPATIENT
Start: 2025-02-07

## 2025-02-09 DIAGNOSIS — M47.812 CERVICAL SPONDYLOSIS WITHOUT MYELOPATHY: ICD-10-CM

## 2025-02-12 DIAGNOSIS — E03.9 HYPOTHYROIDISM, UNSPECIFIED TYPE: ICD-10-CM

## 2025-02-12 RX ORDER — LEVOTHYROXINE SODIUM 88 UG/1
88 TABLET ORAL DAILY
Qty: 90 TABLET | Refills: 0 | Status: SHIPPED | OUTPATIENT
Start: 2025-02-12

## 2025-02-12 NOTE — TELEPHONE ENCOUNTER
Future Appointments    Encounter Information   Provider Department Appt Notes   5/7/2025 Sharyn Engel APRN - CNP [facility] 6 mo f/u     Past Visits    Date Provider Specialty Visit Type Primary Dx   11/06/2024 Sharyn Engel APRN - CNP Family Medicine Office Visit Current moderate episode of major depressive disorder, unspecified

## 2025-02-20 DIAGNOSIS — K21.9 GASTROESOPHAGEAL REFLUX DISEASE WITHOUT ESOPHAGITIS: ICD-10-CM

## 2025-02-21 RX ORDER — OMEPRAZOLE 40 MG/1
40 CAPSULE, DELAYED RELEASE ORAL DAILY
Qty: 90 CAPSULE | Refills: 3 | Status: SHIPPED | OUTPATIENT
Start: 2025-02-21

## 2025-02-21 NOTE — TELEPHONE ENCOUNTER
Pharmacy requesting medication refill. Please approve or deny this request.    Rx requested:  Requested Prescriptions     Pending Prescriptions Disp Refills    omeprazole (PRILOSEC) 40 MG delayed release capsule [Pharmacy Med Name: OMEPRAZOLE 40MG CAPSULES] 90 capsule 3     Sig: TAKE 1 CAPSULE BY MOUTH DAILY         Last Office Visit:   11/6/2024      Next Visit Date:  Future Appointments   Date Time Provider Department Center   5/7/2025  8:30 AM Sharyn Engel APRN - CNP [facility]

## 2025-03-15 DIAGNOSIS — M47.812 CERVICAL SPONDYLOSIS WITHOUT MYELOPATHY: ICD-10-CM

## 2025-03-16 NOTE — TELEPHONE ENCOUNTER
requesting medication refill. Please approve or deny this request.    Rx requested:  Requested Prescriptions     Pending Prescriptions Disp Refills    tiZANidine (ZANAFLEX) 4 MG tablet [Pharmacy Med Name: TIZANIDINE 4MG TABLETS] 60 tablet 0     Sig: TAKE 1 TABLET BY MOUTH TWICE DAILY AS NEEDED FOR SPASM         Last Office Visit:   11/6/2024      Next Visit Date:  Future Appointments   Date Time Provider Department Center   5/7/2025  8:30 AM Sharyn Engel, ROSIBEL - CNP [facility]

## 2025-04-17 DIAGNOSIS — E03.9 HYPOTHYROIDISM, UNSPECIFIED TYPE: ICD-10-CM

## 2025-04-17 NOTE — TELEPHONE ENCOUNTER
Last Office Visit:   11/6/2024    Next Visit Date:  Future Appointments   Date Time Provider Department Center   5/7/2025  8:30 AM Sharyn Engel APRN - CNP [facility]

## 2025-04-18 RX ORDER — LEVOTHYROXINE SODIUM 88 UG/1
88 TABLET ORAL DAILY
Qty: 90 TABLET | Refills: 0 | Status: SHIPPED | OUTPATIENT
Start: 2025-04-18

## 2025-05-01 DIAGNOSIS — M47.812 CERVICAL SPONDYLOSIS WITHOUT MYELOPATHY: ICD-10-CM

## 2025-05-06 SDOH — ECONOMIC STABILITY: INCOME INSECURITY: IN THE LAST 12 MONTHS, WAS THERE A TIME WHEN YOU WERE NOT ABLE TO PAY THE MORTGAGE OR RENT ON TIME?: NO

## 2025-05-06 SDOH — ECONOMIC STABILITY: FOOD INSECURITY: WITHIN THE PAST 12 MONTHS, YOU WORRIED THAT YOUR FOOD WOULD RUN OUT BEFORE YOU GOT MONEY TO BUY MORE.: NEVER TRUE

## 2025-05-06 SDOH — ECONOMIC STABILITY: FOOD INSECURITY: WITHIN THE PAST 12 MONTHS, THE FOOD YOU BOUGHT JUST DIDN'T LAST AND YOU DIDN'T HAVE MONEY TO GET MORE.: NEVER TRUE

## 2025-05-06 SDOH — ECONOMIC STABILITY: TRANSPORTATION INSECURITY
IN THE PAST 12 MONTHS, HAS THE LACK OF TRANSPORTATION KEPT YOU FROM MEDICAL APPOINTMENTS OR FROM GETTING MEDICATIONS?: NO

## 2025-05-06 ASSESSMENT — PATIENT HEALTH QUESTIONNAIRE - PHQ9
8. MOVING OR SPEAKING SO SLOWLY THAT OTHER PEOPLE COULD HAVE NOTICED. OR THE OPPOSITE - BEING SO FIDGETY OR RESTLESS THAT YOU HAVE BEEN MOVING AROUND A LOT MORE THAN USUAL: SEVERAL DAYS
1. LITTLE INTEREST OR PLEASURE IN DOING THINGS: SEVERAL DAYS
7. TROUBLE CONCENTRATING ON THINGS, SUCH AS READING THE NEWSPAPER OR WATCHING TELEVISION: SEVERAL DAYS
5. POOR APPETITE OR OVEREATING: MORE THAN HALF THE DAYS
3. TROUBLE FALLING OR STAYING ASLEEP: SEVERAL DAYS
9. THOUGHTS THAT YOU WOULD BE BETTER OFF DEAD, OR OF HURTING YOURSELF: NOT AT ALL
2. FEELING DOWN, DEPRESSED OR HOPELESS: NOT AT ALL
2. FEELING DOWN, DEPRESSED OR HOPELESS: NOT AT ALL
SUM OF ALL RESPONSES TO PHQ QUESTIONS 1-9: 7
7. TROUBLE CONCENTRATING ON THINGS, SUCH AS READING THE NEWSPAPER OR WATCHING TELEVISION: SEVERAL DAYS
10. IF YOU CHECKED OFF ANY PROBLEMS, HOW DIFFICULT HAVE THESE PROBLEMS MADE IT FOR YOU TO DO YOUR WORK, TAKE CARE OF THINGS AT HOME, OR GET ALONG WITH OTHER PEOPLE: SOMEWHAT DIFFICULT
1. LITTLE INTEREST OR PLEASURE IN DOING THINGS: SEVERAL DAYS
6. FEELING BAD ABOUT YOURSELF - OR THAT YOU ARE A FAILURE OR HAVE LET YOURSELF OR YOUR FAMILY DOWN: NOT AT ALL
8. MOVING OR SPEAKING SO SLOWLY THAT OTHER PEOPLE COULD HAVE NOTICED. OR THE OPPOSITE, BEING SO FIGETY OR RESTLESS THAT YOU HAVE BEEN MOVING AROUND A LOT MORE THAN USUAL: SEVERAL DAYS
10. IF YOU CHECKED OFF ANY PROBLEMS, HOW DIFFICULT HAVE THESE PROBLEMS MADE IT FOR YOU TO DO YOUR WORK, TAKE CARE OF THINGS AT HOME, OR GET ALONG WITH OTHER PEOPLE: SOMEWHAT DIFFICULT
4. FEELING TIRED OR HAVING LITTLE ENERGY: SEVERAL DAYS
9. THOUGHTS THAT YOU WOULD BE BETTER OFF DEAD, OR OF HURTING YOURSELF: NOT AT ALL
SUM OF ALL RESPONSES TO PHQ QUESTIONS 1-9: 7
SUM OF ALL RESPONSES TO PHQ QUESTIONS 1-9: 7
5. POOR APPETITE OR OVEREATING: MORE THAN HALF THE DAYS
SUM OF ALL RESPONSES TO PHQ QUESTIONS 1-9: 7
4. FEELING TIRED OR HAVING LITTLE ENERGY: SEVERAL DAYS
SUM OF ALL RESPONSES TO PHQ QUESTIONS 1-9: 7
6. FEELING BAD ABOUT YOURSELF - OR THAT YOU ARE A FAILURE OR HAVE LET YOURSELF OR YOUR FAMILY DOWN: NOT AT ALL
3. TROUBLE FALLING OR STAYING ASLEEP: SEVERAL DAYS

## 2025-05-07 ENCOUNTER — OFFICE VISIT (OUTPATIENT)
Age: 65
End: 2025-05-07
Payer: COMMERCIAL

## 2025-05-07 ENCOUNTER — HOSPITAL ENCOUNTER (OUTPATIENT)
Dept: ULTRASOUND IMAGING | Age: 65
Discharge: HOME | End: 2025-05-09
Payer: COMMERCIAL

## 2025-05-07 ENCOUNTER — HOSPITAL ENCOUNTER (OUTPATIENT)
Dept: WOMENS IMAGING | Age: 65
Discharge: HOME | End: 2025-05-09
Payer: COMMERCIAL

## 2025-05-07 VITALS
WEIGHT: 180 LBS | DIASTOLIC BLOOD PRESSURE: 84 MMHG | BODY MASS INDEX: 30.73 KG/M2 | HEIGHT: 64 IN | TEMPERATURE: 96.8 F | RESPIRATION RATE: 16 BRPM | HEART RATE: 74 BPM | OXYGEN SATURATION: 97 % | SYSTOLIC BLOOD PRESSURE: 118 MMHG

## 2025-05-07 VITALS — BODY MASS INDEX: 31.38 KG/M2 | HEIGHT: 64 IN

## 2025-05-07 DIAGNOSIS — F32.1 CURRENT MODERATE EPISODE OF MAJOR DEPRESSIVE DISORDER, UNSPECIFIED WHETHER RECURRENT (HCC): ICD-10-CM

## 2025-05-07 DIAGNOSIS — N64.4 BREAST PAIN, LEFT: ICD-10-CM

## 2025-05-07 DIAGNOSIS — R73.03 PREDIABETES: ICD-10-CM

## 2025-05-07 DIAGNOSIS — N63.20 MASS OF LEFT BREAST, UNSPECIFIED QUADRANT: ICD-10-CM

## 2025-05-07 DIAGNOSIS — E55.9 VITAMIN D DEFICIENCY: ICD-10-CM

## 2025-05-07 DIAGNOSIS — Z23 NEED FOR PNEUMOCOCCAL VACCINATION: ICD-10-CM

## 2025-05-07 DIAGNOSIS — E03.9 HYPOTHYROIDISM, UNSPECIFIED TYPE: ICD-10-CM

## 2025-05-07 DIAGNOSIS — E66.811 CLASS 1 OBESITY DUE TO EXCESS CALORIES WITH SERIOUS COMORBIDITY AND BODY MASS INDEX (BMI) OF 31.0 TO 31.9 IN ADULT: ICD-10-CM

## 2025-05-07 DIAGNOSIS — E78.2 MIXED HYPERLIPIDEMIA: ICD-10-CM

## 2025-05-07 DIAGNOSIS — E66.09 CLASS 1 OBESITY DUE TO EXCESS CALORIES WITH SERIOUS COMORBIDITY AND BODY MASS INDEX (BMI) OF 31.0 TO 31.9 IN ADULT: ICD-10-CM

## 2025-05-07 DIAGNOSIS — Z87.891 PERSONAL HISTORY OF TOBACCO USE: ICD-10-CM

## 2025-05-07 DIAGNOSIS — E78.2 MIXED HYPERLIPIDEMIA: Primary | ICD-10-CM

## 2025-05-07 LAB
ALBUMIN SERPL-MCNC: 4.3 G/DL (ref 3.5–4.6)
ALP SERPL-CCNC: 90 U/L (ref 40–130)
ALT SERPL-CCNC: 11 U/L (ref 0–33)
ANION GAP SERPL CALCULATED.3IONS-SCNC: 10 MEQ/L (ref 9–15)
AST SERPL-CCNC: 17 U/L (ref 0–35)
BASOPHILS # BLD: 0.1 K/UL (ref 0–0.2)
BASOPHILS NFR BLD: 1.1 %
BILIRUB SERPL-MCNC: 0.5 MG/DL (ref 0.2–0.7)
BUN SERPL-MCNC: 18 MG/DL (ref 8–23)
CALCIUM SERPL-MCNC: 9.3 MG/DL (ref 8.5–9.9)
CHLORIDE SERPL-SCNC: 101 MEQ/L (ref 95–107)
CHOLEST SERPL-MCNC: 232 MG/DL (ref 0–199)
CO2 SERPL-SCNC: 25 MEQ/L (ref 20–31)
CREAT SERPL-MCNC: 1.02 MG/DL (ref 0.5–0.9)
EOSINOPHIL # BLD: 0.1 K/UL (ref 0–0.7)
EOSINOPHIL NFR BLD: 2.2 %
ERYTHROCYTE [DISTWIDTH] IN BLOOD BY AUTOMATED COUNT: 13.9 % (ref 11.5–14.5)
GLOBULIN SER CALC-MCNC: 2.7 G/DL (ref 2.3–3.5)
GLUCOSE SERPL-MCNC: 101 MG/DL (ref 70–99)
HCT VFR BLD AUTO: 42.5 % (ref 37–47)
HDLC SERPL-MCNC: 46 MG/DL (ref 40–59)
HGB BLD-MCNC: 13.4 G/DL (ref 12–16)
LDLC SERPL CALC-MCNC: 161 MG/DL (ref 0–129)
LYMPHOCYTES # BLD: 1.6 K/UL (ref 1–4.8)
LYMPHOCYTES NFR BLD: 34.4 %
MCH RBC QN AUTO: 28.6 PG (ref 27–31.3)
MCHC RBC AUTO-ENTMCNC: 31.5 % (ref 33–37)
MCV RBC AUTO: 90.6 FL (ref 79.4–94.8)
MONOCYTES # BLD: 0.3 K/UL (ref 0.2–0.8)
MONOCYTES NFR BLD: 6.8 %
NEUTROPHILS # BLD: 2.5 K/UL (ref 1.4–6.5)
NEUTS SEG NFR BLD: 55.3 %
PLATELET # BLD AUTO: 319 K/UL (ref 130–400)
POTASSIUM SERPL-SCNC: 4.6 MEQ/L (ref 3.4–4.9)
PROT SERPL-MCNC: 7 G/DL (ref 6.3–8)
RBC # BLD AUTO: 4.69 M/UL (ref 4.2–5.4)
SODIUM SERPL-SCNC: 136 MEQ/L (ref 135–144)
TRIGL SERPL-MCNC: 123 MG/DL (ref 0–150)
TSH REFLEX: 2.61 UIU/ML (ref 0.44–3.86)
WBC # BLD AUTO: 4.6 K/UL (ref 4.8–10.8)

## 2025-05-07 PROCEDURE — 76642 ULTRASOUND BREAST LIMITED: CPT

## 2025-05-07 PROCEDURE — 90677 PCV20 VACCINE IM: CPT | Performed by: NURSE PRACTITIONER

## 2025-05-07 PROCEDURE — G0296 VISIT TO DETERM LDCT ELIG: HCPCS | Performed by: NURSE PRACTITIONER

## 2025-05-07 PROCEDURE — 90471 IMMUNIZATION ADMIN: CPT | Performed by: NURSE PRACTITIONER

## 2025-05-07 PROCEDURE — 99214 OFFICE O/P EST MOD 30 MIN: CPT | Performed by: NURSE PRACTITIONER

## 2025-05-07 PROCEDURE — G0279 TOMOSYNTHESIS, MAMMO: HCPCS

## 2025-05-07 RX ORDER — PHENTERMINE HYDROCHLORIDE 37.5 MG/1
37.5 TABLET ORAL
Qty: 30 TABLET | Refills: 0 | Status: SHIPPED | OUTPATIENT
Start: 2025-05-07 | End: 2025-06-06

## 2025-05-07 NOTE — PROGRESS NOTES
Monika Maddox (:  1960) is a 65 y.o. female, Established patient, here for evaluation of the following chief complaint(s):  Depression (Patient is here for a 6 month f/u on Depression/Anxiety. Patient is not taking the Wellbutrin anymore. ) and Discuss Medications (Patient would like to know if she can be put back on the medication she was on about a year ago to help curb her appetite. )          Subjective   History of Present Illness  The patient presents for evaluation of depression, weight management, and smoking cessation.    She has discontinued the use of Wellbutrin but maintains a reserve supply for potential future use. Her mental health status is stable, with no exacerbation of depressive or anxiety symptoms. She reports that her son is faring well, but her  presents a different situation, which she has chosen to disregard.    She expresses a desire to resume Adipex treatment for weight management. She is not fasting today. She has an adequate water intake. She is not experiencing any abdominal discomfort or edema in her lower extremities.    She successfully quit smoking while on Wellbutrin but has since resumed smoking at a rate of one cigarette per day following the cessation of the medication.    She is due for her annual lung screening, which was inadvertently missed in 2024. She has a mammogram scheduled for today. She attempted to schedule an ultrasound prior to this appointment but was unsuccessful due to insurance restrictions until after 2025.    SOCIAL HISTORY  The patient smokes 1 cigarette a day.    Past Medical History:   Diagnosis Date   • Colon polyposis 2013    multiple on colonoscopy, do [id number]   • Depression    • Esophageal web 2013   • Fibromyalgia 2011   • GERD (gastroesophageal reflux disease)    • Hyperlipidemia    • Hypothyroidism    • Macular degeneration     left eye   • Normal nuclear stress test 2013     Past Surgical

## 2025-05-08 ENCOUNTER — RESULTS FOLLOW-UP (OUTPATIENT)
Age: 65
End: 2025-05-08

## 2025-05-08 LAB
ESTIMATED AVERAGE GLUCOSE: 120 MG/DL
HBA1C MFR BLD: 5.8 % (ref 4–6)
VITAMIN D 25-HYDROXY: 36.5 NG/ML (ref 30–100)

## 2025-05-09 ENCOUNTER — RESULTS FOLLOW-UP (OUTPATIENT)
Age: 65
End: 2025-05-09

## 2025-05-25 DIAGNOSIS — M47.812 CERVICAL SPONDYLOSIS WITHOUT MYELOPATHY: ICD-10-CM

## 2025-06-11 ENCOUNTER — OFFICE VISIT (OUTPATIENT)
Age: 65
End: 2025-06-11
Payer: COMMERCIAL

## 2025-06-11 VITALS
RESPIRATION RATE: 16 BRPM | BODY MASS INDEX: 29.19 KG/M2 | WEIGHT: 171 LBS | TEMPERATURE: 96.6 F | HEART RATE: 88 BPM | SYSTOLIC BLOOD PRESSURE: 120 MMHG | HEIGHT: 64 IN | DIASTOLIC BLOOD PRESSURE: 70 MMHG | OXYGEN SATURATION: 97 %

## 2025-06-11 DIAGNOSIS — E66.811 CLASS 1 OBESITY DUE TO EXCESS CALORIES WITH SERIOUS COMORBIDITY AND BODY MASS INDEX (BMI) OF 31.0 TO 31.9 IN ADULT: ICD-10-CM

## 2025-06-11 DIAGNOSIS — E66.09 CLASS 1 OBESITY DUE TO EXCESS CALORIES WITH SERIOUS COMORBIDITY AND BODY MASS INDEX (BMI) OF 31.0 TO 31.9 IN ADULT: ICD-10-CM

## 2025-06-11 PROCEDURE — 99213 OFFICE O/P EST LOW 20 MIN: CPT | Performed by: NURSE PRACTITIONER

## 2025-06-11 RX ORDER — PHENTERMINE HYDROCHLORIDE 37.5 MG/1
37.5 TABLET ORAL
Qty: 30 TABLET | Refills: 0 | Status: CANCELLED | OUTPATIENT
Start: 2025-06-11 | End: 2025-07-11

## 2025-06-11 RX ORDER — PHENTERMINE HYDROCHLORIDE 37.5 MG/1
37.5 CAPSULE ORAL EVERY MORNING
Qty: 30 CAPSULE | Refills: 0 | Status: SHIPPED | OUTPATIENT
Start: 2025-06-11 | End: 2025-07-11

## 2025-06-11 NOTE — PROGRESS NOTES
medication was more effective and did not cause constipation compared to the tablet form.  - Blood pressure and heart rate are perfect.  - Prescription for the capsule form of her medication sent to pharmacy. Follow-up scheduled for next month.     No follow-ups on file.    The patient (or guardian, if applicable) and other individuals in attendance with the patient were advised that Artificial Intelligence will be utilized during this visit to record, process the conversation to generate a clinical note and to support improvement of the AI technology. The patient (or guardian, if applicable) and other individuals in attendance at the appointment consented to the use of AI, including the recording.      An electronic signature was used to authenticate this note.    --Sharyn Engel, ROSIBEL - CNP

## 2025-06-25 DIAGNOSIS — M47.812 CERVICAL SPONDYLOSIS WITHOUT MYELOPATHY: ICD-10-CM

## 2025-06-27 NOTE — TELEPHONE ENCOUNTER
----- Message from Dave Junior sent at 9/21/2021 12:25 PM EDT -----  Subject: Message to Provider    QUESTIONS  Information for Provider? [facility] would like to know the status on   Evolocumab. I cannot provide any information to them.   ---------------------------------------------------------------------------  --------------  CALL BACK INFO  What is the best way for the office to contact you? Do not leave any   message, patient will call back for answer  Preferred Call Back Phone Number? [phone]  ---------------------------------------------------------------------------  --------------  SCRIPT ANSWERS  Relationship to Patient? Third Party  Representative Name?  Tim not applicable

## 2025-07-16 ENCOUNTER — OFFICE VISIT (OUTPATIENT)
Age: 65
End: 2025-07-16
Payer: COMMERCIAL

## 2025-07-16 VITALS
WEIGHT: 165 LBS | RESPIRATION RATE: 16 BRPM | OXYGEN SATURATION: 96 % | TEMPERATURE: 97 F | BODY MASS INDEX: 28.17 KG/M2 | SYSTOLIC BLOOD PRESSURE: 118 MMHG | DIASTOLIC BLOOD PRESSURE: 74 MMHG | HEIGHT: 64 IN | HEART RATE: 81 BPM

## 2025-07-16 DIAGNOSIS — E66.811 CLASS 1 OBESITY DUE TO EXCESS CALORIES WITH SERIOUS COMORBIDITY AND BODY MASS INDEX (BMI) OF 31.0 TO 31.9 IN ADULT: ICD-10-CM

## 2025-07-16 DIAGNOSIS — E66.09 CLASS 1 OBESITY DUE TO EXCESS CALORIES WITH SERIOUS COMORBIDITY AND BODY MASS INDEX (BMI) OF 31.0 TO 31.9 IN ADULT: ICD-10-CM

## 2025-07-16 PROCEDURE — 99213 OFFICE O/P EST LOW 20 MIN: CPT | Performed by: NURSE PRACTITIONER

## 2025-07-16 RX ORDER — PHENTERMINE HYDROCHLORIDE 37.5 MG/1
37.5 CAPSULE ORAL EVERY MORNING
Qty: 30 CAPSULE | Refills: 0 | Status: SHIPPED | OUTPATIENT
Start: 2025-07-16 | End: 2025-08-15

## 2025-07-16 NOTE — PROGRESS NOTES
Monika Maddox (:  1960) is a 65 y.o. female, Established patient, here for evaluation of the following chief complaint(s):  Weight Management (Patient is here for a 1 month f/u on Adipex for weight loss.)          Subjective   History of Present Illness  The patient presents for a follow-up visit for weight management    She reports a weight loss of 6 pounds and is feeling well overall. Her sleep pattern is normal, and she has not experienced any adverse effects from her medications. She has been managing her constipation effectively with MiraLAX and a diet rich in fruits. Additionally, she mentions that her clothes are fitting better now.     She has not yet undergone the CAT scan of her lungs due to a busy schedule but plans to do so soon.    She restarted Crestor after her last labs.    Diet: She eats lots of fruit.  Sleep: She reports normal sleep patterns.    Past Medical History:   Diagnosis Date    ADHD (attention deficit hyperactivity disorder)     Anxiety     Colon polyposis 2013    multiple on colonoscopy, do [id number]    Depression     Esophageal web 2013    Fibromyalgia 2011    GERD (gastroesophageal reflux disease)     Hyperlipidemia     Hypothyroidism     Macular degeneration     left eye    Normal nuclear stress test 2013     Past Surgical History:   Procedure Laterality Date     SECTION      CHOLECYSTECTOMY  2013    Lapchole with gram    COLONOSCOPY  2013    COLONOSCOPY N/A 2023    COLONOSCOPY DIAGNOSTIC performed by Jose Raul Freeman MD at [facility]    HERNIA REPAIR      HYSTERECTOMY (CERVIX STATUS UNKNOWN)      still has one ovary    HYSTERECTOMY, TOTAL ABDOMINAL (CERVIX REMOVED)      OVARY REMOVAL Right     UPPER GASTROINTESTINAL ENDOSCOPY  2013    UPPER GASTROINTESTINAL ENDOSCOPY N/A 2023    EGD DIAGNOSTIC with dilation, polypectomy and biopsy performed by Jose Raul Freeman MD at [facility]    US BREAST

## 2025-07-20 DIAGNOSIS — M47.812 CERVICAL SPONDYLOSIS WITHOUT MYELOPATHY: ICD-10-CM

## 2025-08-06 ENCOUNTER — TELEPHONE (OUTPATIENT)
Age: 65
End: 2025-08-06

## 2025-08-08 ENCOUNTER — OFFICE VISIT (OUTPATIENT)
Dept: URGENT CARE | Age: 65
End: 2025-08-08
Payer: COMMERCIAL

## 2025-08-08 ENCOUNTER — APPOINTMENT (OUTPATIENT)
Dept: URGENT CARE | Age: 65
End: 2025-08-08
Payer: COMMERCIAL

## 2025-08-08 VITALS
WEIGHT: 167 LBS | TEMPERATURE: 98.9 F | RESPIRATION RATE: 16 BRPM | BODY MASS INDEX: 28.51 KG/M2 | SYSTOLIC BLOOD PRESSURE: 131 MMHG | HEIGHT: 64 IN | OXYGEN SATURATION: 98 % | HEART RATE: 84 BPM | DIASTOLIC BLOOD PRESSURE: 85 MMHG

## 2025-08-08 DIAGNOSIS — H69.91 DYSFUNCTION OF RIGHT EUSTACHIAN TUBE: Primary | ICD-10-CM

## 2025-08-08 DIAGNOSIS — J30.9 ALLERGIC RHINITIS, UNSPECIFIED SEASONALITY, UNSPECIFIED TRIGGER: ICD-10-CM

## 2025-08-08 RX ORDER — PREDNISONE 10 MG/1
10 TABLET ORAL
Qty: 10 TABLET | Refills: 0 | Status: SHIPPED | OUTPATIENT
Start: 2025-08-08 | End: 2025-08-13

## 2025-08-08 RX ORDER — AMOXICILLIN AND CLAVULANATE POTASSIUM 875; 125 MG/1; MG/1
1 TABLET, FILM COATED ORAL 2 TIMES DAILY
Qty: 10 TABLET | Refills: 0 | Status: SHIPPED | OUTPATIENT
Start: 2025-08-08 | End: 2025-08-13

## 2025-08-08 RX ORDER — BROMPHENIRAMINE MALEATE, PSEUDOEPHEDRINE HYDROCHLORIDE, AND DEXTROMETHORPHAN HYDROBROMIDE 2; 30; 10 MG/5ML; MG/5ML; MG/5ML
5 SYRUP ORAL EVERY 4 HOURS PRN
Qty: 120 ML | Refills: 0 | Status: SHIPPED | OUTPATIENT
Start: 2025-08-08

## 2025-08-08 ASSESSMENT — PAIN SCALES - GENERAL: PAINLEVEL_OUTOF10: 6

## 2025-08-17 DIAGNOSIS — M47.812 CERVICAL SPONDYLOSIS WITHOUT MYELOPATHY: ICD-10-CM

## (undated) DEVICE — CONMED SCOPE SAVER BITE BLOCK, 20X27 MM: Brand: SCOPE SAVER

## (undated) DEVICE — TUBING, SUCTION, 1/4" X 10', STRAIGHT: Brand: MEDLINE

## (undated) DEVICE — SINGLE PORT MANIFOLD: Brand: NEPTUNE 2

## (undated) DEVICE — Device: Brand: ENDO SMARTCAP

## (undated) DEVICE — TUBE SET 96 MM 64 MM H2O PERISTALTIC STD AUX CHANNEL

## (undated) DEVICE — ENDO CARRY-ON PROCEDURE KIT: Brand: ENDO CARRY-ON PROCEDURE KIT

## (undated) DEVICE — FORCEPS BX L240CM JAW DIA2.8MM L CAP W/ NDL MIC MESH TOOTH

## (undated) DEVICE — BRUSH ENDO CLN L90.5IN SHTH DIA1.7MM BRIST DIA5-7MM 2-6MM

## (undated) DEVICE — CLEANGUIDE DISPOSABLE MARKED SPRING TIP GUIDEWIRE, 1.86 MM X 210 CM: Brand: CLEANGUIDE

## (undated) DEVICE — DILATOR ESOPHAGEAL CLEANGUIDE DISP OTW 16MM